# Patient Record
Sex: MALE | Race: WHITE | NOT HISPANIC OR LATINO | Employment: OTHER | URBAN - METROPOLITAN AREA
[De-identification: names, ages, dates, MRNs, and addresses within clinical notes are randomized per-mention and may not be internally consistent; named-entity substitution may affect disease eponyms.]

---

## 2023-04-25 PROCEDURE — 99285 EMERGENCY DEPT VISIT HI MDM: CPT | Mod: 25

## 2023-04-26 ENCOUNTER — HOSPITAL ENCOUNTER (OUTPATIENT)
Facility: CLINIC | Age: 33
Setting detail: OBSERVATION
Discharge: STILL ADMITTED OR ADMINISTRATIVE | End: 2023-04-30
Attending: EMERGENCY MEDICINE | Admitting: NURSE PRACTITIONER
Payer: MEDICARE

## 2023-04-26 DIAGNOSIS — F33.1 MODERATE EPISODE OF RECURRENT MAJOR DEPRESSIVE DISORDER (H): ICD-10-CM

## 2023-04-26 DIAGNOSIS — F84.0 AUTISM SPECTRUM DISORDER: ICD-10-CM

## 2023-04-26 DIAGNOSIS — F41.9 ANXIETY: ICD-10-CM

## 2023-04-26 DIAGNOSIS — F22 DELUSIONS (H): Primary | ICD-10-CM

## 2023-04-26 PROBLEM — F32.A DEPRESSION, UNSPECIFIED DEPRESSION TYPE: Status: ACTIVE | Noted: 2023-04-26

## 2023-04-26 LAB — SARS-COV-2 RNA RESP QL NAA+PROBE: NEGATIVE

## 2023-04-26 PROCEDURE — G0378 HOSPITAL OBSERVATION PER HR: HCPCS

## 2023-04-26 PROCEDURE — 250N000013 HC RX MED GY IP 250 OP 250 PS 637: Performed by: EMERGENCY MEDICINE

## 2023-04-26 PROCEDURE — 90791 PSYCH DIAGNOSTIC EVALUATION: CPT

## 2023-04-26 PROCEDURE — 99222 1ST HOSP IP/OBS MODERATE 55: CPT | Mod: AI | Performed by: NURSE PRACTITIONER

## 2023-04-26 PROCEDURE — 250N000013 HC RX MED GY IP 250 OP 250 PS 637: Performed by: NURSE PRACTITIONER

## 2023-04-26 PROCEDURE — U0005 INFEC AGEN DETEC AMPLI PROBE: HCPCS | Performed by: EMERGENCY MEDICINE

## 2023-04-26 RX ORDER — LANOLIN ALCOHOL/MO/W.PET/CERES
3 CREAM (GRAM) TOPICAL
Status: DISCONTINUED | OUTPATIENT
Start: 2023-04-26 | End: 2023-04-30 | Stop reason: HOSPADM

## 2023-04-26 RX ORDER — ARIPIPRAZOLE 2 MG/1
2 TABLET ORAL DAILY
Status: ON HOLD | COMMUNITY
End: 2023-05-01

## 2023-04-26 RX ORDER — QUETIAPINE FUMARATE 25 MG/1
25 TABLET, FILM COATED ORAL 3 TIMES DAILY PRN
Status: DISCONTINUED | OUTPATIENT
Start: 2023-04-26 | End: 2023-04-30 | Stop reason: HOSPADM

## 2023-04-26 RX ORDER — TRAZODONE HYDROCHLORIDE 50 MG/1
50 TABLET, FILM COATED ORAL AT BEDTIME
Status: ON HOLD | COMMUNITY
End: 2023-05-01

## 2023-04-26 RX ADMIN — MELATONIN TAB 3 MG 3 MG: 3 TAB at 21:43

## 2023-04-26 RX ADMIN — QUETIAPINE FUMARATE 25 MG: 25 TABLET ORAL at 21:43

## 2023-04-26 RX ADMIN — MELATONIN TAB 3 MG 3 MG: 3 TAB at 04:17

## 2023-04-26 ASSESSMENT — COLUMBIA-SUICIDE SEVERITY RATING SCALE - C-SSRS
5. HAVE YOU STARTED TO WORK OUT OR WORKED OUT THE DETAILS OF HOW TO KILL YOURSELF? DO YOU INTEND TO CARRY OUT THIS PLAN?: NO
2. HAVE YOU ACTUALLY HAD ANY THOUGHTS OF KILLING YOURSELF?: NO
1. HAVE YOU WISHED YOU WERE DEAD OR WISHED YOU COULD GO TO SLEEP AND NOT WAKE UP?: YES
4. HAVE YOU HAD THESE THOUGHTS AND HAD SOME INTENTION OF ACTING ON THEM?: NO
ATTEMPT LIFETIME: NO
TOTAL  NUMBER OF INTERRUPTED ATTEMPTS LIFETIME: NO
1. IN THE PAST MONTH, HAVE YOU WISHED YOU WERE DEAD OR WISHED YOU COULD GO TO SLEEP AND NOT WAKE UP?: NO
2. HAVE YOU ACTUALLY HAD ANY THOUGHTS OF KILLING YOURSELF?: YES
3. HAVE YOU BEEN THINKING ABOUT HOW YOU MIGHT KILL YOURSELF?: YES
6. HAVE YOU EVER DONE ANYTHING, STARTED TO DO ANYTHING, OR PREPARED TO DO ANYTHING TO END YOUR LIFE?: NO
TOTAL  NUMBER OF ABORTED OR SELF INTERRUPTED ATTEMPTS LIFETIME: NO

## 2023-04-26 ASSESSMENT — ACTIVITIES OF DAILY LIVING (ADL)
ADLS_ACUITY_SCORE: 35
ADLS_ACUITY_SCORE: 35
HYGIENE/GROOMING: INDEPENDENT
ADLS_ACUITY_SCORE: 35

## 2023-04-26 ASSESSMENT — ENCOUNTER SYMPTOMS: DYSPHORIC MOOD: 1

## 2023-04-26 NOTE — CONSULTS
"Diagnostic Evaluation Consultation  Crisis Assessment    Patient was assessed: I-Pad  Patient location: [facility]  Was a release of information signed: No. Reason: NA      Referral Data and Chief Complaint  Pt is a 33 year old, who uses he/him pronouns, and presents to the ED via EMS. Patient is referred to the ED by other: community member. Patient is presenting to the ED for the following concerns: increasing depression, mild symptoms of psychosis.      Informed Consent and Assessment Methods     Patient is his own guardian. Writer met with patient and explained the crisis assessment process, including applicable information disclosures and limits to confidentiality, assessed understanding of the process, and obtained consent to proceed with the assessment. Patient was observed to be able to participate in the assessment as evidenced by engagement with . Assessment methods included conducting a formal interview with patient, review of medical records, collaboration with medical staff, and obtaining relevant collateral information from family and community providers when available..     Over the course of this crisis assessment provided reassurance and offered validation. Patient's response to interventions was engaged.     Summary of Patient Situation     Pt presents to the ED with increase in depression. Per EMS report a bystander in the community called 911 because pt was \"acting strangely,\" searching for his dog who he lost 7 years ago and was not answering questions appropriately. Pt was A & O x4.     Writer met with pt via ipad, pt in [facility]. Pt was pleasant, calm, and cooperative in conversation, with some delayed responses to questions from writer. Pt is unable to share what led to 911 call that brought him to the ED. Py confirms that a bystander in the community called 911, however, reports he is unsure of why they might have called. Pt reports prior to arrival, he was having trouble sleeping.     Pt " "reports his sleep has been poor overall, average ~ 4 hours/night. Pt reports poor appetite, but eating \"a little bit.\" Pt reports experiencing depression almost his entire life, with it worsening since his  hospitalization in 2019. Pt is not currently taking medications. Denies SI, denies HI.     Pt reports AH, describes this as hearing and experiencing songs differently than he has in the past. Pt reports th tone, language, and feelings on certain songs are different than they used to be. Pt has difficulty explaining this to writer. Pt denies any voices    Pt reports VH, describes experiencing numbers and words differently. For example, when he sees a sign that says, \"Speed Limit,\" each letter has a meaning and the words form sentences.     Pt reports that he feels like he needs structure, \"unity, labor, vision, courage,\" and expresses value toward impulsivity. Pt reports he is open to medications, with some hesitation.     Brief Psychosocial History     Pt reports he is currently not working and is homeless. Pt reports he has been homeless for ~12 years, and has been staying at shelters. Pt reports he does not have any family or friends who he currently remains in contact with.     Pt identifies hobbies of: playing video games, listening to music, going for walks. Pt also reports he likes animals and reports that he treats others well.     Significant Clinical History     Per chart review, pt has previous MH dx of MDD, recurrent, severe, with psychotic features, schizoaffective d/o, and ASD. Last  admission was 11/30/2019-12/11/2019. Pt was discharged with medications to an IR facility.     Pt confirms history of the following medications: Abilify, trazodone, vistaril, and Zyprexa. Pt reports he did not take the Zyprexa after his discharge because he did not like the way it made him feel. Pt reports he took Abilify for ~ 3 years, but discontinued 2/1/2023. Pt reports discontinuation of Trazodone ~3-4 weeks " "ago.     Pt reports he see an OP psychiatry provider, Zoya. Pt reports last appt \"a few weeks ago.\" Per chart review, last appt was 4/18/23. Pt reports he does not currently have an OP MH therapist. Pt reports he currently has a mental health , Arben Hua.      Collateral Information    The following information was received from Julito whose relationship to the patient is . Information was obtained via phone. Their phone number is [phone] and they last had contact with patient last month.     How long have you been working with the patient: 2 years        How often do you see them: a couple times a month     What is the patient's legal status (Commitment, probation, guardian, etc.): volunatry     How does their current level of functioning compare to baseline: was not aware he was in ED;  At last contact, had no concerns.      Concern about alcohol/drug use? No     Has the patient made any comments about wanting to kill themselves/others: No     What is your treatment plan going forward: recently moved into a new apartment, in process of getting furniture.  Appointment with [facility] on 5/15/23. Weight loss.  Helping mom (ZURI tx hx) and brother (homeless), grandma in a major part of his life too.      Other information: CM referred patient to therapy at [place], denied service due to wait.   Psychiatry has provider.  She will be in a meeting until 1p today so coordination can be done after that hour.      -Collateral collected by Ector Troy, LGSW, MSW     Risk Assessment  [place] Suicide Severity Rating Scale Full Clinical Version:4/26/2023  Suicidal Ideation  1. Wish to be Dead (Lifetime): Yes  1. Wish to be Dead (Past 1 Month): No  2. Non-Specific Active Suicidal Thoughts (Lifetime): Yes  2. Non-Specific Active Suicidal Thoughts (Past 1 Month): No  3. Active Suicidal Ideation with any Methods (Not Plan) Without Intent to Act (Lifetime): Yes  Active Suicidal Ideation with any " Methods (Not Plan) Description (Lifetime): thoughts of jumping off a  bridge  3. Active Suicidal Ideation with any Methods (Not Plan) Without Intent to Act (Past 1 Month): No  4. Active Suicidal Ideation with Some Intent to Act, Without Specific Plan (Lifetime): No  5. Active Suicidal Ideation with Specific Plan and Intent (Lifetime): No  Intensity of Ideation  Most Severe Ideation Rating (Lifetime): 3  Suicidal Behavior  Actual Attempt (Lifetime): No  Has subject engaged in non-suicidal self-injurious behavior? (Lifetime): No  Interrupted Attempts (Lifetime): No  Aborted or Self-Interrupted Attempt (Lifetime): No  Preparatory Acts or Behavior (Lifetime): No  C-SSRS Risk (Lifetime/Recent)  Calculated C-SSRS Risk Score (Lifetime/Recent): No Risk Indicated        Validity of evaluation is not impacted by presenting factors during interview.   Comments regarding subjective versus objective responses to [place] tool: NA  Environmental or Psychosocial Events: helplessness/hopelessness, unemployment/underemployment, unstable housing, homelessness and social isolation  Chronic Risk Factors: history of psychiatric hospitalization, chronic and ongoing sleep difficulties, parental substance abuse issue and serious, persistent mental illness   Warning Signs: seeking access to means to hurt or kill self, hopelessness, acting reckless or engaging in risky activities, feeling trapped, like there is no way out, no reason for living, no sense of purpose in life and engaging in self-destructive behavior  Protective Factors: able to access care without barriers and optimistic outlook - identification of future goals  Interpretation of Risk Scoring, Risk Mitigation Interventions and Safety Plan:  Pt currently presents with low risk of harm to self. Pt denies SI, denies NSSIB. However, pt presents with increasing depression with some psychotic features, providing an element of risk.          Does the patient have thoughts of harming  others? No     Is the patient engaging in sexually inappropriate behavior?  no        Current Substance Abuse     Is there recent substance abuse? no     Was a urine drug screen or blood alcohol level obtained: No       Mental Status Exam     Affect: Appropriate   Appearance: Appropriate    Attention Span/Concentration: Attentive  Eye Contact: Engaged   Fund of Knowledge: Appropriate    Language /Speech Content: Fluent   Language /Speech Volume: Normal    Language /Speech Rate/Productions: Normal    Recent Memory: Variable   Remote Memory: Intact   Mood: Depressed    Orientation to Person: Yes    Orientation to Place: Yes   Orientation to Time of Day: Yes    Orientation to Date: Yes    Situation (Do they understand why they are here?): Yes    Psychomotor Behavior: Normal    Thought Content: Clear   Thought Form: Intact      History of commitment: No       Medication    Psychotropic medications:   Current Facility-Administered Medications   Medication     melatonin tablet 3 mg     No current outpatient medications on file.     Medication changes made in the last two weeks: Pt reports he is not taking medications. See medication hx in clinical history section.        Current Care Team    Primary Care Provider:[facility]  Psychiatrist: Karen Michelle CNS    Therapist: No  : Arben Hua, [phone]      CTSS or ARMHS: No  ACT Team: No  Other: No      Diagnosis    296.34 (F33.3) Major Depressive Disorder, Recurrent Episode, With psychotic features _ - Primary, by history    Clinical Summary and Substantiation of Recommendations    Pt currently presents with low risk of harm to self. Pt denies SI, denies NSSIB. However, pt presents with increasing depression with some psychotic features, providing an element of risk. Pt is interested in discussing medication with [facility] psychiatry provider. Pt is open to staying overnight in [facility] if recommended by care team. Pt may benefit from stabilization of  acute symptoms of psychosis and lack of sleep while in [facility].   A lower level of care has been unsuccessful in treating and stabilizing patient s mental health symptoms. However, with brief observation, monitored therapeutic treatment, and intervention of mental health symptoms in [facility], symptoms may be mitigated with potential for disposition to a less restrictive level of care than an inpatient setting.    Disposition    Recommended disposition: Other: Observation       Reviewed case and recommendations with attending provider. Attending Name: Filipe Chaves     Attending concurs with disposition: Yes       Patient and/or validated legal guardian concurs with disposition: Yes       Final disposition: Other: Observation in [facility].       Assessment Details    Patient interview started at: 9:33am and completed at: 10:08am.     Total duration spent on the patient case in minutes: .75 hrs      CPT code(s) utilized: 96938 - Psychotherapy for Crisis - 60 (30-74*) min       LUANNE Borden, Psychotherapist Trainee, Psychotherapist  [facility] - Triage & Transition Services  Callback: [phone]

## 2023-04-26 NOTE — PROGRESS NOTES
[facility] Group Progress Note    Client Name: Connor Soares  Date: April 26, 2023  Service Type:  Group Therapy      Topic:   Morning check in group/goal for the day     Response:  Patient did not participate in group.        HELADIO Ortiz, LICSW

## 2023-04-26 NOTE — ED TRIAGE NOTES
Patient states depression for 10 years.  Denies suicidal thoughts.  Reports he is homeless.  Denies drug or alcohol use.       Triage Assessment     Row Name 04/26/23 0000       Triage Assessment (Adult)    Airway WDL WDL       Respiratory WDL    Respiratory WDL WDL       Skin Circulation/Temperature WDL    Skin Circulation/Temperature WDL WDL       Cardiac WDL    Cardiac WDL WDL       Peripheral/Neurovascular WDL    Peripheral Neurovascular WDL WDL       Cognitive/Neuro/Behavioral WDL    Cognitive/Neuro/Behavioral WDL X    Level of Consciousness alert    Arousal Level opens eyes spontaneously    Orientation oriented x 4       Ford Coma Scale    Best Eye Response 4-->(E4) spontaneous    Best Motor Response 6-->(M6) obeys commands    Best Verbal Response 5-->(V5) oriented    Rohit Coma Scale Score 15

## 2023-04-26 NOTE — PLAN OF CARE
Connor Soares  April 26, 2023  Plan of Care Hand-off Note     Patient Care Path: Observation    Plan for Care:     Pt currently presents with low risk of harm to self. Pt denies SI, denies NSSIB. However, pt presents with increasing depression with some psychotic features, providing an element of risk. Pt is interested in discussing medication with [facility] psychiatry provider. [facility] by care team. Pt may benefit from stabilization of acute symptoms of psychosis and lack of sleep while in [facility].     A lower level of care has been unsuccessful in treating and stabilizing patient s mental health symptoms. However, with brief observation, monitored therapeutic treatment, and intervention of mental health symptoms in [facility], symptoms may be mitigated with potential for disposition to a less restrictive level of care than an inpatient setting.    Critical Safety Issues: increasing depression with some symptoms of psychosis    Overview:  This patient is a child/adolescent: No    This patient has additional special visitor precautions: No    Legal Status: Voluntary    Contacts:   : Arben Hua, [phone]     Psychiatry Consult:  [facility]    Updated Attending Provider regarding plan of care.    LUANNE Borden

## 2023-04-26 NOTE — ED PROVIDER NOTES
"[facility] Unit - Initial Psychiatric Observation Note  [facility] Emergency Department  Observation Initiation Date: Apr 25, 2023    Connor Soares MRN: [id number]   Age: 33 year old YOB: 1990     History     Chief Complaint   Patient presents with     Depression     HPI  Connor Soares is a 33 year old male with a past history notable for major depressive disorder versus schizoaffective disorder, depressive type, anxiety, and autism spectrum disorder. Patient presented to the emergency department after a bystander contacted 911 due to concern that patient was behaving strangely. The bystander told EMS that patient had stated that he was looking for a dog that he lost 7 years ago and was not answering questions appropriately. Patient was medically evaluated in the emergency department and found to be medically stable for transfer to [facility] for further psychiatric assessment.     Here at [facility], patient has been resting in his recliner chair for most of the day. Nursing notes reviewed, report received from [facility]. On interview, patient begins to only answering questions by shaking his head. When asked how he is feeling, he gives a \"thumbs up.\" When discussing writer's understanding of the events leading to his emergency department visit, patient simply began shaking his head \"no\" as though the described events were not accurate. When asked for his account of the events, he does not answer. When asked if recalls speaking with the [facility] earlier today, he shakes his head \"no.\" When asked if he sees a psychiatric provider, he shakes his head \"no,\" despite recent notes indicating that he sees a psychiatric provider named Karen Vizcaino at [facility]. Began to discuss how we can best help patient while he is here. He agrees that he would like treatment for his depression. At this point, he begins speaking. He reports that he would like a normal life where he wakes up, brushes his teeth, showers, puts on " "clothes, does the dishes, takes out the trash. Reports he would like more of a purpose, and does not want to waste his life away. He then mentions he would like to go to [place], but does not have a plan to get there. He also says he could go to a hotel from here but it is unclear if he is able to pay for a hotel room. He is not currently interested in a crisis residence or shelter. We begin discussing treatment for his depression. Writer begins listing antidepressants he has heard of or taken. Patient declines most medications listed. Reports he had tried escitalopram, sertraline, fluoxetine. Had a bad reaction to Effexor and began laughing when this was mentioned, but declined to discuss his experience. Patient then states \"you could list all the antidepressants in the world and I probably wouldn't be open to taking any of them.\" We discussed to have patient stay overnight on observation and reassess tomorrow, to which he was agreeable. He denies any suicidal or homicidal ideation. Denies auditory or visual hallucinations. He does not appear manic.     Past Medical History  No past medical history on file.  No past surgical history on file.  ARIPiprazole (ABILIFY) 2 MG tablet  Semaglutide (OZEMPIC, 2 MG/DOSE, SC)  traZODone (DESYREL) 50 MG tablet      No Known Allergies  Family History  No family history on file.  Social History           Review of Systems  A medically appropriate review of systems was performed with pertinent positives and negatives noted in the HPI, and all other systems negative.    Physical Examination   BP: (!) 154/74  Pulse: 114  Temp: 98  F (36.7  C)  Resp: 16  Height: 175.3 cm (5' 9\")  Weight: (!) 172.4 kg (380 lb)  SpO2: 98 %    Physical Exam  General: Appears stated age.   Neuro: Alert and fully oriented. Extremities appear to demonstrate normal strength on visual inspection.   Integumentary/Skin: no rash visualized, normal color    Psychiatric Examination   Appearance: awake, alert, " adequately groomed, dressed in hospital scrubs and appeared as age stated  Attitude:  cooperative  Eye Contact:  vacillating from poor to intense stare  Mood:  anxious and depressed  Affect:  mood congruent and intensity is blunted  Speech:  clear, coherent and normal prosody  Psychomotor Behavior:  no evidence of tardive dyskinesia, dystonia, or tics  Thought Process:  illogical  Associations:  no loose associations  Thought Content:  no evidence of suicidal ideation or homicidal ideation, no auditory hallucinations present, no visual hallucinations present and unclear if he is experiencing delusional thinking  Insight:  fair  Judgement:  fair  Oriented to:  time, person, and place  Attention Span and Concentration:  fair  Recent and Remote Memory:  fair  Language: able to name/identify objects without impairment  Fund of Knowledge: intact with awareness of current and past events    ED Course        Labs Ordered and Resulted from Time of ED Arrival to Time of ED Departure   COVID-19 VIRUS (CORONAVIRUS) BY PCR - Normal       Result Value    SARS CoV2 PCR Negative         Assessments & Plan (with Medical Decision Making)   Patient presenting to the emergency department due to bystander concern regarding odd behavior. Pt with hx of depression, anxiety, and ASD. Not currently prescribed medications but is seeing a psychiatrist. He is homeless. He was not open to starting any medication tonight but is agreeable to remain on observation for ongoing disposition planning. Nursing notes reviewed noting no acute issues.     I have reviewed the assessment completed by the [facility].     During the observation period, the patient did not require medications for agitation, and did not require restraints/seclusion for patient and/or provider safety.     The patient was found to have a psychiatric condition that would benefit from an observation stay in the emergency department for further psychiatric stabilization and/or  coordination of a safe disposition. The observation plan includes serial assessments of psychiatric condition, potential administration of medications if indicated, further disposition pending the patient's psychiatric course during the monitoring period.     Preliminary diagnosis:    ICD-10-CM    1. Moderate episode of recurrent major depressive disorder (H)  F33.1     r/o psychotic features, r/o schizoaffective disorder, depressive type      2. Autism spectrum disorder  F84.0       3. Anxiety  F41.9            Treatment Plan:  - Defer initiation of an antidepressant for tonight due to patient preference  - Will order quetiapine 25 mg tid prn for anxiety or sleep  - Patient to f/u with his outpatient psychiatrist upon discharge. Would also benefit from a psychotherapist.   - [place] to observation status with plan for reevaluation tomorrow    --  Filipe Chaves CNP   [facility] EMERGENCY DEPT  [facility] Unit  4/25/2023        Filipe Chaves CNP  04/26/23 [id number]

## 2023-04-26 NOTE — PROGRESS NOTES
Calm and cooperative, pleasant, denies pain, normal affect, slept until 9. Am, ate breakfast, now talking with [facility] via video chat.

## 2023-04-26 NOTE — PROGRESS NOTES
The following information was received from Julito whose relationship to the patient is . Information was obtained via phone. Their phone number is [phone] and they last had contact with patient last month.    How long have you been working with the patient: 2 years       How often do you see them: a couple times a month    What is the patient's legal status (Commitment, probation, guardian, etc.): volunatry    How does their current level of functioning compare to baseline: was not aware he was in ED;  At last contact, had no concerns.     Concern about alcohol/drug use? No    Has the patient made any comments about wanting to kill themselves/others: No     What is your treatment plan going forward: recently moved into a new apartment, in process of getting furniture.  Appointment with [facility] on 5/15/23. Weight loss.  Helping mom (ZURI tx hx) and brother (homeless), grandma in a major part of his life too.     Other information: CM referred patient to therapy at [place], denied service due to wait.   Psychiatry has provider.  She will be in a meeting until 1p today so coordination can be done after that hour.     Ector Troy, LGSW, MSW

## 2023-04-26 NOTE — ED PROVIDER NOTES
"    History     Chief Complaint:  Depression       The history is provided by the patient.      Connor Soares is a 33 year old male with a history of schizoaffective disorder, depression, and autism spectrum disorder who presents with increase depression. The patient reports that his depression symptoms have worsened causing him to present to the ED. He states that he is currently unemployed and homeless. He has previously taken medications for his mental health but notes that this was 12 years ago and has since been taken off these medications. The patient does not have a therapist and has not talked to anyone regarding these symptoms. He denies suicidal ideations. He also denies drug or alcohol use.       Independent Historian: No independent historian      Review of External Notes: Reviewed the psychiatry visit from 4/18    ROS:  Review of Systems   Psychiatric/Behavioral: Positive for dysphoric mood. Negative for suicidal ideas.   All other systems reviewed and are negative.      Allergies:  The patient has no active allergies.      Medications:    Albuterol   Ozempic     Past Medical History:    Binge eating disorder   Schizoaffective disorder   Autism spectrum disorder   psychosis  Depression   Anxiety   Asthma   ADHD  Bipolar disorder     Past Surgical History:    Tonsillectomy and adenoidectomy      Social History:  The patient reports that he is unemployed and homeless.   He arrived via EMS.     Physical Exam     Patient Vitals for the past 24 hrs:   BP Temp Temp src Pulse Resp SpO2 Height Weight   04/26/23 0001 (!) 154/74 98  F (36.7  C) Temporal 114 16 98 % 1.753 m (5' 9\") (!) 172.4 kg (380 lb)        Physical Exam  General: Sitting up in bed  Eyes:  The pupils are equal and round    Conjunctivae and sclerae are normal  ENT:    Wearing a mask  Neck:  Normal range of motion  CV:  Tachycardic rate    Skin warm and well perfused   Resp:  Non labored breathing on room air    No tachypnea    No cough " heard  MS:  Normal muscular tone  Skin:  No rash or acute skin lesions noted  Neuro:   Awake, alert.      Speech is normal and fluent.    Face is symmetric.     Moves all extremities equally  Psych: Normal affect.  Appropriate interactions.    Emergency Department Course     Laboratory:  Labs Ordered and Resulted from Time of ED Arrival to Time of ED Departure   COVID-19 VIRUS (CORONAVIRUS) BY PCR - Normal       Result Value    SARS CoV2 PCR Negative        Emergency Department Course & Assessments:    Independent Interpretation (X-rays, CTs, rhythm strip):  None      Consultations/Discussion of Management or Tests:  None      Assessments:  0220 I obtained history and examined the patient as noted above. We discussed transfer to [facility] and the patient is comfortable with this plan.     Disposition:  The patient was transferred to [facility].     Impression & Plan    CMS Diagnoses: None      Medical Decision Making:  Connor Soares is a 33-year-old male who presented to the emergency department with depression.  He reports wanting help for depression.  He denies any suicidal or homicidal ideation.  Denies any drug use.  He is calm in the emergency department.  He would like to go to [facility].  He has no medical concerns at this time.  Final disposition per [facility].    Diagnosis:    ICD-10-CM    1. Depression, unspecified depression type  F32.A            Scribe Disclosure:  IKrupa, am serving as a scribe at 2:31 AM on 4/26/2023 to document services personally performed by Clarissa Doan MD based on my observations and the provider's statements to me.    4/26/2023   Clarissa Doan MD Goertz, Maria Kristine, MD  04/26/23 0609

## 2023-04-26 NOTE — PROGRESS NOTES
Pt presents due to worsening depression, homelessness, and fleeting thoughts of suicide with no plan or intent. He has had no previous suicide attempts, he takes no medications, denies drug or use, no HI, no halliucations. Pt is seeking help for his ongoing depression and resources for his living situation. Pt changed to scrubs, his belongings were placed in  a locker. Tour and routine of unit explained to pt.

## 2023-04-27 PROCEDURE — G0378 HOSPITAL OBSERVATION PER HR: HCPCS

## 2023-04-27 PROCEDURE — 250N000013 HC RX MED GY IP 250 OP 250 PS 637: Performed by: PSYCHIATRY & NEUROLOGY

## 2023-04-27 PROCEDURE — 99231 SBSQ HOSP IP/OBS SF/LOW 25: CPT | Mod: 95 | Performed by: PSYCHIATRY & NEUROLOGY

## 2023-04-27 PROCEDURE — 250N000013 HC RX MED GY IP 250 OP 250 PS 637: Performed by: NURSE PRACTITIONER

## 2023-04-27 RX ORDER — QUETIAPINE FUMARATE 50 MG/1
50 TABLET, FILM COATED ORAL AT BEDTIME
Status: DISCONTINUED | OUTPATIENT
Start: 2023-04-27 | End: 2023-04-30 | Stop reason: HOSPADM

## 2023-04-27 RX ADMIN — QUETIAPINE FUMARATE 50 MG: 50 TABLET ORAL at 22:22

## 2023-04-27 RX ADMIN — QUETIAPINE FUMARATE 25 MG: 25 TABLET ORAL at 20:02

## 2023-04-27 ASSESSMENT — ACTIVITIES OF DAILY LIVING (ADL)
ADLS_ACUITY_SCORE: 35

## 2023-04-27 NOTE — ED NOTES
Pt agreeable with plan to stay overnight on observation. Pt spent majority of evening sleeping in recliner. Woke up to eat dinner, watch TV, and meet with provider. Patient initially stated he is not currently taking medications and did not want to take any medications. Pt endorses depressed mood this evening, denies feeling anxious, denies SI. At bedtime, pt requested Seroquel for sleep. Pt is sleeping in Sensory Room D. Nursing will continue to monitor.

## 2023-04-27 NOTE — ED NOTES
[facility] Group Progress Note    Client Name: Connor Soares  Date: April 26, 2023  Service Type:  Group Therapy  Session Start Time:  7:30pm  Session End Time: 8:30pm  Session Length: 60 minutes  Attendees: Patient and other group members  Facilitator: LUANNE Solano     Topic:   Group activity. Choice to do meditation or play game    Intervention:    Group process: support, challenge, affirm, psycho-education.     Response:  Patient did not participate in group. Pt chose to remain resting in a sensory room.        LUANNE Solano

## 2023-04-27 NOTE — PROGRESS NOTES
Pt sleeping comfortably in sensory room D; no signs of distress or discomfort observed.  Q15 min checks ongoing.

## 2023-04-27 NOTE — PROGRESS NOTES
"Triage & Transition Services [facility]     Progress Note    Patient: Connor goes by \"Connor,\" uses he/him pronouns  Date of Service: April 27, 2023  Site of Service: [facility]  Patient was seen in-person.     Presenting problem:  Please see initial DEC/[facility] Crisis Assessment completed by Martin Mccauley on 4/26/2023 for complete assessment information. Notable concerns include increasing depression, mild symptoms of psychosis.     Individuals Present: Connor & Ector Troy [facility]    Session start: 1315  Session end: 1335  Session duration in minutes: 20  CPT utilized: 37102 - Psychotherapy (with patient) - 30 (16-37*) min      Current Presentation:   Patient and writer met in sensory room D for reassessment.  He shook writer's hand at introduction.  Patient's responses throughout assessment with slightly delayed or he asked for a moment to compose his thoughts, sometimes his responses were slightly unrelated to the question/prompt posed.  Patient reports that he is feeling good with lots of sleep, being around positive people.  He reports that [facility] has allowed him to think about his past choices, both good and bad.      Patient engaged in conversation about outpatient providers.  He reports meeting with a medication management provider off and on for three years at [facility].  Patient is agreeable today to reconnecting with his  (yesterday he declined an NIMO for her).  He is interested in getting set up with outpatient therapy as well before discharge.     Patient thinks another night in observation would be helpful so we could be more planful about discharge back to his newly acquired apartment.  He has reservations about his apartment as \"it is in the basement of the building.\"  Patient is willing to work with writer and  tomorrow to create a safe discharge plan.     Additional Collateral Information:  None     Mental Status Exam     Affect: Appropriate   Appearance: Appropriate " and Other: slight malodorous    Attention Span/Concentration: Attentive  Eye Contact: Engaged   Fund of Knowledge: Appropriate    Language /Speech Content: Fluent   Language /Speech Volume: Normal    Language /Speech Rate/Productions: Normal    Recent Memory: Variable   Remote Memory: Variable   Mood: Normal    Orientation to Person: Yes    Orientation to Place: Yes   Orientation to Time of Day: Yes    Orientation to Date: Yes    Situation (Do they understand why they are here?): Yes    Psychomotor Behavior: Normal    Thought Content: Clear   Thought Form: Intact     Diagnosis:   296.34 (F33.3) Major Depressive Disorder, Recurrent Episode, With psychotic features _ - Primary, by history    Therapeutic Intervention(s):   Provided active listening, unconditional positive regard, and validation. Engaged in safety planning.  Reviewed healthy living that supports positive mental health, including looking at sleep hygiene, regular movement, nutrition, and regular socialization. Identified and practiced coping skills.    Treatment Objective(s) Addressed:   The focus of this session was on rapport building, orienting the patient to therapy, safety planning, identifying an appropriate aftercare plan, assessing safety and identifying additional supports.     Progress Towards Goals:   Patient reports stable symptoms. Patient is making progress towards treatment goals as evidenced by feeling better and willing to reconnect with outpatient providers.     Case Management:   none     Plan:   Observation: to continue medication administration for mental health stability    Attending concurs with disposition: Yes        Clinical Summary and Substantiation of Recommendations    Patient is improved since yesterday in regards to willingness to talk and engage in planning of care.  He has engaged eye contact, thoughts are slightly delayed yet forward oriented, and slightly malodorous.  Patient recognizes improvement here at [facility] and  is open to reconnecting with new and established outpatient providers.  Patient is unsure if he wants to return to his newly acquired apartment.  Patient denies SI, HI, hallucinations, and paranoia.       LUANNE Gaitan

## 2023-04-27 NOTE — PROGRESS NOTES
"Patient resting in recliner in sensory room D most of shift. He has been making his needs known appropriately. Eating and drinking well. No scheduled medication this morning. Declined need for PRN's. Eating and drinking well. Flat affect, mood is described as \"okay\". Denies SI. Awaiting reassessment today.   "

## 2023-04-27 NOTE — ED NOTES
Pt resting in recliner in Sensory Room D; respirations even and unlabored. Will continue to monitor.

## 2023-04-27 NOTE — ED PROVIDER NOTES
Telemedicine Visit: The patient's condition can be safely assessed and treated via synchronous audio and visual telemedicine encounter.      Reason for Telemedicine Visit: Patient required immediate assessment / treatment     Originating Site (Patient Location): [facility]    Distant Location (provider location):  Off-site    Consent:  The patient/guardian has verbally consented to: the potential risks and benefits of telemedicine (video visit) versus in person care; bill my insurance or make self-payment for services provided; and responsibility for payment of non-covered services.     Mode of Communication:  Video Conference via [employer]    As the provider I attest to compliance with applicable laws and regulations related to telemedicine.    Start/Stop: 4:30p-5:00p    [facility] Unit - Psychiatric Consultation  [facility] Emergency Department    Connor Soares MRN: [id number]   Age: 33 year old YOB: 1990     History     Chief Complaint   Patient presents with     Depression     Per Filipe Chaves CNP, 4/26/23:  HPI  Connor Soares is a 33 year old male with a past history notable for major depressive disorder versus schizoaffective disorder, depressive type, anxiety, and autism spectrum disorder. Patient presented to the emergency department after a bystander contacted 911 due to concern that patient was behaving strangely. The bystander told EMS that patient had stated that he was looking for a dog that he lost 7 years ago and was not answering questions appropriately. Patient was medically evaluated in the emergency department and found to be medically stable for transfer to [facility] for further psychiatric assessment.      Here at [facility], patient has been resting in his recliner chair for most of the day. Nursing notes reviewed, report received from [facility]. On interview, patient begins to only answering questions by shaking his head. When asked how he is feeling, he gives a  "\"thumbs up.\" When discussing writer's understanding of the events leading to his emergency department visit, patient simply began shaking his head \"no\" as though the described events were not accurate. When asked for his account of the events, he does not answer. When asked if recalls speaking with the [facility] earlier today, he shakes his head \"no.\" When asked if he sees a psychiatric provider, he shakes his head \"no,\" despite recent notes indicating that he sees a psychiatric provider named Karen Vizcaino at [facility]. Began to discuss how we can best help patient while he is here. He agrees that he would like treatment for his depression. At this point, he begins speaking. He reports that he would like a normal life where he wakes up, brushes his teeth, showers, puts on clothes, does the dishes, takes out the trash. Reports he would like more of a purpose, and does not want to waste his life away. He then mentions he would like to go to [place], but does not have a plan to get there. He also says he could go to a hotel from here but it is unclear if he is able to pay for a hotel room. He is not currently interested in a crisis residence or shelter. We begin discussing treatment for his depression. Writer begins listing antidepressants he has heard of or taken. Patient declines most medications listed. Reports he had tried escitalopram, sertraline, fluoxetine. Had a bad reaction to Effexor and began laughing when this was mentioned, but declined to discuss his experience. Patient then states \"you could list all the antidepressants in the world and I probably wouldn't be open to taking any of them.\" We discussed to have patient stay overnight on observation and reassess tomorrow, to which he was agreeable. He denies any suicidal or homicidal ideation. Denies auditory or visual hallucinations. He does not appear manic.      Today, 4/27/23:  Patient reports overall feeling a little bit better.  Symptoms improving some " "with quetiapine use.  Slept better after taking quetiapine last night.  Patient agreeable to scheduling quetiapine at bedtime to further help with his mental health symptoms.  Appetite has been good.  Tolerating quetiapine well.  Did not feel too sedated in the morning.  No acute safety concerns.  No acute suicidality today.    Past Medical History  No past medical history on file.  No past surgical history on file.  ARIPiprazole (ABILIFY) 2 MG tablet  Semaglutide (OZEMPIC, 2 MG/DOSE, SC)  traZODone (DESYREL) 50 MG tablet      No Known Allergies  Family History  No family history on file.  Social History       Past medical history, past surgical history, medications, allergies, family history, and social history were reviewed with the patient. No additional pertinent items.       Review of Systems  A medically appropriate review of systems was performed with pertinent positives and negatives noted in the HPI, and all other systems negative.    Physical Examination   BP: (!) 154/74  Pulse: 114  Temp: 98  F (36.7  C)  Resp: 16  Height: 175.3 cm (5' 9\")  Weight: (!) 172.4 kg (380 lb)  SpO2: 98 %    Physical Exam  General: Appears stated age.   Neuro: Alert and fully oriented. Extremities appear to demonstrate normal strength on visual inspection.   Integumentary/Skin: no rash visualized, normal color    Psychiatric Examination   Appearance: awake, alert, adequately groomed and appeared as age stated  Attitude:  cooperative  Eye Contact:  good  Mood:  anxious and depressed  Affect:  intensity is blunted  Speech:  clear, coherent  Psychomotor Behavior:  no evidence of tardive dyskinesia, dystonia, or tics  Thought Process:  logical  Associations:  no loose associations  Thought Content:  no evidence of suicidal ideation or homicidal ideation, no evidence of psychotic thought, no auditory hallucinations present and no visual hallucinations present  Insight:  Fair to limited  Judgement:  fair  Oriented to:  time, person, " and place  Attention Span and Concentration:  fair  Recent and Remote Memory:  fair  Language: able to name/identify objects without impairment  Fund of Knowledge: intact with awareness of current and past events    ED Course        Labs Ordered and Resulted from Time of ED Arrival to Time of ED Departure   COVID-19 VIRUS (CORONAVIRUS) BY PCR - Normal       Result Value    SARS CoV2 PCR Negative         Assessments & Plan (with Medical Decision Making)   Patient presenting with to the emergency room after displaying bizarre behavior and a bystander called EMS with concern.  History of depression, anxiety, and autism spectrum disorder and presented on no psychiatric medication.  Has been seeing a psychiatrist.  Patient is homeless.  Patient declined starting any medication yesterday at intake but is agreeable today to scheduling quetiapine at bedtime since it has seen been helpful.  Outpatient provider will need to monitor metabolic impact of quetiapine closely since patient already overweight.  Recommend maintaining on lowest effective dose.  Nursing notes reviewed noting no acute issues.     I have reviewed the assessment completed by the [facility].     Preliminary diagnosis:    ICD-10-CM    1. Moderate episode of recurrent major depressive disorder (H)  F33.1     r/o psychotic features, r/o schizoaffective disorder, depressive type      2. Autism spectrum disorder  F84.0       3. Anxiety  F41.9            Treatment Plan:  -Continue observation status for safety and stabilization  -Continue quetiapine/Seroquel 25 mg TID PRN for anxiety, agitation, sleep  -Start quetiapine/Seroquel 50 mg at bedtime for hx of psychosis, mood, sleep, anxiety.   -Patient will follow up with outpatient psychiatrist upon discharge.  Recommend close metabolic monitoring while on quetiapine.  Also recommend maintaining quetiapine at lowest effective dose.  -Reassess in the morning    --  Kelly Perez DO   [facility]  [facility]  [facility]  4/27/2023     This note was created with voice recognition software. Inadvertent grammatical errors, typographical errors, and \"sound-a-like\" substitutions may occur due to limitations of the software.  Read the note carefully and apply context when erroneous substitutions have occurred. Thank you.        Kelly Perez,   04/27/23 8227    "

## 2023-04-28 PROCEDURE — 99233 SBSQ HOSP IP/OBS HIGH 50: CPT | Performed by: NURSE PRACTITIONER

## 2023-04-28 PROCEDURE — 250N000013 HC RX MED GY IP 250 OP 250 PS 637: Performed by: NURSE PRACTITIONER

## 2023-04-28 PROCEDURE — 250N000013 HC RX MED GY IP 250 OP 250 PS 637: Performed by: PSYCHIATRY & NEUROLOGY

## 2023-04-28 PROCEDURE — G0378 HOSPITAL OBSERVATION PER HR: HCPCS

## 2023-04-28 RX ADMIN — QUETIAPINE FUMARATE 25 MG: 25 TABLET ORAL at 10:02

## 2023-04-28 RX ADMIN — QUETIAPINE FUMARATE 50 MG: 50 TABLET ORAL at 21:05

## 2023-04-28 ASSESSMENT — ACTIVITIES OF DAILY LIVING (ADL)
ADLS_ACUITY_SCORE: 35

## 2023-04-28 NOTE — PROGRESS NOTES
Pt is asleep in the recliner in sensory room D.  No observed signs of discomfort or distress noted.  Q15 min checks ongoing.

## 2023-04-28 NOTE — PROGRESS NOTES
"Triage & Transition Services [facility]     Progress Note    Patient: Connor goes by \"Connor,\" uses he/him pronouns  Date of Service: April 28, 2023  Site of Service: [facility]  Patient was seen in-person.     Presenting problem:  Please see initial DEC/[facility] Crisis Assessment completed by Martin Mccauley on 4/26/2023 for complete assessment information. Notable concerns include increasing depression, mild symptoms of psychosis.     Individuals Present: Connor & Ector Troy [facility]    Session start: 1500  Session end: 1515  Session duration in minutes: 15  CPT utilized: 73655 - Psychotherapy (with patient) - 30 (16-37*) min      Current Presentation:   Patient has been isolating to Sensory Room D all day with the exception of participating in morning group.  Patient has difficulty expressing his concerns despite stating \"I need help.\"  He does get frustrated and slightly irritable when asked to describe what he needs help with.  With writer during this interaction, patient stated with some agitation \"I should not have to tell you.\"  Writer provided assurance to patient that we are not able to fully support patient if we do not know what is bothering him.  He finally disclosed that he has a bug in his stomach.  Writer thanked him for sharing that information so we can loop in the RN and psychiatry provider on how to best help patient.  Patient denied have any urges to self-remove the bug from his stomach and chuckled saying he would not even think of doing that.     He continues to deny having any apartment.     Patient agrees to spend another night in observation and meet with the evening provider to discuss further medication needs.     Additional Collateral Information:  None as patient states he has no one to collaborate with     Mental Status Exam     Affect: Appropriate   Appearance: Appropriate    Attention Span/Concentration: Attentive  Eye Contact: Engaged   Fund of Knowledge: Appropriate    Language /Speech Content: " Fluent   Language /Speech Volume: Normal    Language /Speech Rate/Productions: Normal    Recent Memory: Variable   Remote Memory: Variable   Mood: Depressed    Orientation to Person: Yes    Orientation to Place: Yes   Orientation to Time of Day: No    Orientation to Date: No    Situation (Do they understand why they are here?): Yes    Psychomotor Behavior: Normal    Thought Content: Delusions and Hallucinations   Thought Form: Intact     Diagnosis:   296.34 (F33.3) Major Depressive Disorder, Recurrent Episode, With psychotic features    Therapeutic Intervention(s):   Provided active listening, unconditional positive regard, and validation. Engaged in cognitive restructuring/ reframing, looked at common cognitive distortions and challenged negative thoughts. Engaged in activity scheduling and behavioral activation, looking at and reviewing the prior week's goals, problem solving any barriers and acknowledging successes, as well as setting new goals. Engaged in social skills training. Identified and practiced coping skills. Identified stress relief practices.    Treatment Objective(s) Addressed:   The focus of this session was on orienting the patient to therapy, identifying and practicing coping strategies, processing feelings related to feeling like he has a bug in his stomach, safety planning, assessing safety, identifying treatment goals and identifying additional supports.     Progress Towards Goals:   Patient reports stable symptoms. Patient is not making progress towards treatment goals as evidenced by isolating to sensory room D and being very guarded about symptoms/feelings for most of the day.     Case Management:   none     Plan:   Observation: continue observation status and explore further medication treatments for newly disclosed having a bug in his stomach    Attending concurs with disposition: Yes        Clinical Summary and Substantiation of Recommendations    Patient has been isolating to sensory  room D all day yet did emerge to participate in morning group which he was invited to.  Patient reports often that he is here for help with difficulty identifying what specifically he is wanting for help.  Patient eventually shared with writer that he has a bug in his stomach that he was taken care.  He denies any urges to remove the bug himself and is open to further medication intervention.  Patient also denies having an apartment or a safe place to return to when discharge is recommended, likely tomorrow.  Patient will stay on observation tonight.       Ector Troy, SOSW

## 2023-04-28 NOTE — PROGRESS NOTES
"EmPATH Group Progress Note    Client Name: Connor Soares  Date: April 28, 2023  Service Type:  Group Therapy  Session Start Time:  1015  Session End Time: 1035  Session Length: 20  Attendees: Patient and other group members  Facilitator: LUANNE Gaitan MSW     Topic:   Daily goals, positive experience and a challenged faced in the past 24 hours    Intervention:    Group process: support, challenge, affirm, psycho-education.     Response:  Patient did participate in group. Behavior in group was engaged. Patient shared his goals today are to lose weight and get help.  When asked what help he was hoping to receive, patient notes \"mental health, physical, and emotional.\"  His positive experience in the past 24 hours is interacting with people who are kind and respectful.  Patient's challenge in the past 24 hours is \"trying to understand myself better.\"  Patient appeared to have difficulty reading the hand-out sheet.      LUANNE Gaitan          "

## 2023-04-28 NOTE — ED PROVIDER NOTES
"[facility] Unit - Psychiatric Consultation  [facility] Emergency Department    Connor Soares MRN: [id number]   Age: 33 year old YOB: 1990     History     Chief Complaint   Patient presents with     Depression     HPI  Connor Soares is a 33 year old male with a past history notable for major depressive disorder versus schizoaffective disorder, depressive type, anxiety, and autism spectrum disorder. Patient presented to the emergency department after a bystander contacted 911 due to concern that patient was behaving strangely. The bystander told EMS that patient had stated that he was looking for a dog that he lost 7 years ago and was not answering questions appropriately. Patient was medically evaluated in the emergency department and found to be medically stable for transfer to [facility] for further psychiatric assessment.     Patient is seen for reassessment today. He appears brighter in affect than he appeared two days ago. He is more conversant, answering questions. He endorses improved sleep since starting quetiapine. Tolerating the 50 mg dose well without endorsement of side effects. He endorses improvements in his mood, which he attributes to the medication and talking with staff. He denies any suicidal or homicidal ideation. Asked him about a bug in his stomach, which he had mentioned to the [facility]. He denies any stomach upset, nausea, diarrhea, or constipation. He reports that this \"bug\" affects his brain and causes him to reflect on the past, interactions with people, \"positive and negative things.\" Reports he has been trying to get rid of this bug for quite some time. We discuss a plan to remain on observation for an additional night with plan for discharge tomorrow. He declines initiation of an antidepressant, stating he has trialed a number of antidepressants in the past and none have been helpful.     Past Medical History  No past medical history on file.  No past surgical history on " "file.  ARIPiprazole (ABILIFY) 2 MG tablet  Semaglutide (OZEMPIC, 2 MG/DOSE, SC)  traZODone (DESYREL) 50 MG tablet      No Known Allergies  Family History  No family history on file.  Social History           Review of Systems  A medically appropriate review of systems was performed with pertinent positives and negatives noted in the HPI, and all other systems negative.    Physical Examination   BP: (!) 154/74  Pulse: 114  Temp: 98  F (36.7  C)  Resp: 16  Height: 175.3 cm (5' 9\")  Weight: (!) 172.4 kg (380 lb)  SpO2: 98 %    Physical Exam  General: Appears stated age.   Neuro: Alert and fully oriented. Extremities appear to demonstrate normal strength on visual inspection.   Integumentary/Skin: no rash visualized, normal color    Psychiatric Examination   Appearance: awake, alert, adequately groomed, dressed in hospital scrubs and appeared as age stated  Attitude:  cooperative  Eye Contact:  good  Mood:  better  Affect:  mood congruent and intensity is normal  Speech:  clear, coherent and normal prosody  Psychomotor Behavior:  no evidence of tardive dyskinesia, dystonia, or tics  Thought Process:  linear and goal oriented  Associations:  no loose associations  Thought Content:  no evidence of suicidal ideation or homicidal ideation and possible delusions. Denies AH/VH.   Insight:  fair  Judgement:  fair  Oriented to:  time, person, and place  Attention Span and Concentration:  fair  Recent and Remote Memory:  fair  Language: able to name/identify objects without impairment  Fund of Knowledge: intact with awareness of current and past events    ED Course        Labs Ordered and Resulted from Time of ED Arrival to Time of ED Departure   COVID-19 VIRUS (CORONAVIRUS) BY PCR - Normal       Result Value    SARS CoV2 PCR Negative         Assessments & Plan (with Medical Decision Making)   Patient presenting with bizarre behavior leading to concern from a bystander who contacted 911. History of depression, anxiety, and " autism spectrum disorder and presented on no psychiatric medication. Has been seeing a psychiatrist. Mood appears to be improving from time of admission. Continues to display sort of an odd affect with possible delusional beliefs. Declines initiation of an antidepressant. Nursing notes reviewed noting no acute issues.     I have reviewed the assessment completed by the [facility].     Preliminary diagnosis:    ICD-10-CM    1. Moderate episode of recurrent major depressive disorder (H)  F33.1     r/o psychotic features, r/o schizoaffective disorder, depressive type      2. Autism spectrum disorder  F84.0       3. Anxiety  F41.9            Treatment Plan:  - Continue quetiapine 50 mg at bedtime for sleep, anxiety, mood, psychosis  - Continue quetiapine 25 mg tid prn for anxiety, agitation, sleep  - Continues to decline initiation of an antidepressant  -Patient will follow up with outpatient psychiatrist upon discharge.  Recommend close metabolic monitoring while on quetiapine.  Also recommend maintaining quetiapine at lowest effective dose.  - Patient to remain on observation for an additional night. Plan for discharge to his apartment tomorrow.     --  Filipe Chaves CNP   [facility] EMERGENCY DEPT  EmPATH Unit  4/25/2023      Filipe Chaves CNP  04/28/23 [id number]

## 2023-04-28 NOTE — ED NOTES
"Pt reports he is having racing thoughts and auditory hallucinations; requested PRN medication. When asked to describe auditory hallucinations, patient reports \"they are racing thoughts in my own voice.\" Pt denies command hallucinations. Pt denies SI currently. Will continue to monitor.   "

## 2023-04-28 NOTE — ED NOTES
Patient to stay overnight on observation and be reassessed in the morning. Patient spent majority of shift sleeping or resting in Sensory Room D. Pt is friendly and conversational when approached, but otherwise withdrawn and isolative in sensory room. Pt endorsed intermittent racing thoughts and anxiety this evening. Pt was not able to identify if he was anxious about anything specific. Pt denies SI this evening. Pt ate 100% of dinner. Pt is sleeping in Sensory Room D. Nursing will continue to monitor.

## 2023-04-28 NOTE — ED NOTES
[facility] C[facility] Group Progress Note    Client Name: Connor Soares  Date: April 27, 2023  Service Type:  Group Therapy  Session Start Time:  650  Session End Time: 710  Session Length: 20 min  Attendees: Patient and other group members  Facilitator: Montserrat Cheney     Topic:   Coping MH sx, anxiety    Intervention:    Group process: support, challenge, affirm, psycho-education.     Response:  Patient did not participate in group.      Montserrat Cheney [facility]

## 2023-04-28 NOTE — PROGRESS NOTES
"Pt slept well overnight in sensory room C. He has been resting in recliner in the sensory room most of morning but did move into milieu to participate in group. Pt endorses anxiety and \"trouble finding his thoughts\". Agreeable to taking PRN seroquel this morning. Reports \"I don't like feeling this way\". He has difficulty elaborating on what he means by this. Patient slow to respond at times and appears frustrated when describing how he feels. Denies SI. Eating and drinking well.   "

## 2023-04-29 ENCOUNTER — TELEPHONE (OUTPATIENT)
Dept: BEHAVIORAL HEALTH | Facility: CLINIC | Age: 33
End: 2023-04-29
Payer: MEDICARE

## 2023-04-29 LAB
ALBUMIN SERPL BCG-MCNC: 4.2 G/DL (ref 3.5–5.2)
ALP SERPL-CCNC: 83 U/L (ref 40–129)
ALT SERPL W P-5'-P-CCNC: 46 U/L (ref 10–50)
ANION GAP SERPL CALCULATED.3IONS-SCNC: 13 MMOL/L (ref 7–15)
AST SERPL W P-5'-P-CCNC: 23 U/L (ref 10–50)
BASOPHILS # BLD AUTO: 0.1 10E3/UL (ref 0–0.2)
BASOPHILS NFR BLD AUTO: 1 %
BILIRUB SERPL-MCNC: 0.4 MG/DL
BUN SERPL-MCNC: 8.4 MG/DL (ref 6–20)
CALCIUM SERPL-MCNC: 9.7 MG/DL (ref 8.6–10)
CHLORIDE SERPL-SCNC: 101 MMOL/L (ref 98–107)
CREAT SERPL-MCNC: 0.97 MG/DL (ref 0.67–1.17)
DEPRECATED HCO3 PLAS-SCNC: 25 MMOL/L (ref 22–29)
EOSINOPHIL # BLD AUTO: 0.2 10E3/UL (ref 0–0.7)
EOSINOPHIL NFR BLD AUTO: 2 %
ERYTHROCYTE [DISTWIDTH] IN BLOOD BY AUTOMATED COUNT: 13 % (ref 10–15)
GFR SERPL CREATININE-BSD FRML MDRD: >90 ML/MIN/1.73M2
GLUCOSE SERPL-MCNC: 89 MG/DL (ref 70–99)
HCT VFR BLD AUTO: 45.5 % (ref 40–53)
HGB BLD-MCNC: 14.6 G/DL (ref 13.3–17.7)
IMM GRANULOCYTES # BLD: 0 10E3/UL
IMM GRANULOCYTES NFR BLD: 0 %
LYMPHOCYTES # BLD AUTO: 2.1 10E3/UL (ref 0.8–5.3)
LYMPHOCYTES NFR BLD AUTO: 20 %
MCH RBC QN AUTO: 28.5 PG (ref 26.5–33)
MCHC RBC AUTO-ENTMCNC: 32.1 G/DL (ref 31.5–36.5)
MCV RBC AUTO: 89 FL (ref 78–100)
MONOCYTES # BLD AUTO: 0.7 10E3/UL (ref 0–1.3)
MONOCYTES NFR BLD AUTO: 7 %
NEUTROPHILS # BLD AUTO: 7 10E3/UL (ref 1.6–8.3)
NEUTROPHILS NFR BLD AUTO: 70 %
NRBC # BLD AUTO: 0 10E3/UL
NRBC BLD AUTO-RTO: 0 /100
PLATELET # BLD AUTO: 293 10E3/UL (ref 150–450)
POTASSIUM SERPL-SCNC: 4.2 MMOL/L (ref 3.4–5.3)
PROT SERPL-MCNC: 7.2 G/DL (ref 6.4–8.3)
RBC # BLD AUTO: 5.12 10E6/UL (ref 4.4–5.9)
SODIUM SERPL-SCNC: 139 MMOL/L (ref 136–145)
TSH SERPL DL<=0.005 MIU/L-ACNC: 2 UIU/ML (ref 0.3–4.2)
WBC # BLD AUTO: 10.1 10E3/UL (ref 4–11)

## 2023-04-29 PROCEDURE — 85025 COMPLETE CBC W/AUTO DIFF WBC: CPT | Performed by: STUDENT IN AN ORGANIZED HEALTH CARE EDUCATION/TRAINING PROGRAM

## 2023-04-29 PROCEDURE — 250N000013 HC RX MED GY IP 250 OP 250 PS 637: Performed by: PSYCHIATRY & NEUROLOGY

## 2023-04-29 PROCEDURE — 36415 COLL VENOUS BLD VENIPUNCTURE: CPT | Performed by: STUDENT IN AN ORGANIZED HEALTH CARE EDUCATION/TRAINING PROGRAM

## 2023-04-29 PROCEDURE — 80053 COMPREHEN METABOLIC PANEL: CPT | Performed by: STUDENT IN AN ORGANIZED HEALTH CARE EDUCATION/TRAINING PROGRAM

## 2023-04-29 PROCEDURE — 250N000013 HC RX MED GY IP 250 OP 250 PS 637: Performed by: EMERGENCY MEDICINE

## 2023-04-29 PROCEDURE — G0378 HOSPITAL OBSERVATION PER HR: HCPCS

## 2023-04-29 PROCEDURE — 84443 ASSAY THYROID STIM HORMONE: CPT | Performed by: STUDENT IN AN ORGANIZED HEALTH CARE EDUCATION/TRAINING PROGRAM

## 2023-04-29 PROCEDURE — 99238 HOSP IP/OBS DSCHRG MGMT 30/<: CPT | Performed by: STUDENT IN AN ORGANIZED HEALTH CARE EDUCATION/TRAINING PROGRAM

## 2023-04-29 RX ORDER — ACETAMINOPHEN 325 MG/1
650 TABLET ORAL EVERY 4 HOURS PRN
Status: DISCONTINUED | OUTPATIENT
Start: 2023-04-29 | End: 2023-04-30 | Stop reason: HOSPADM

## 2023-04-29 RX ORDER — OLANZAPINE 10 MG/2ML
10 INJECTION, POWDER, FOR SOLUTION INTRAMUSCULAR 2 TIMES DAILY PRN
Status: DISCONTINUED | OUTPATIENT
Start: 2023-04-29 | End: 2023-04-30 | Stop reason: HOSPADM

## 2023-04-29 RX ORDER — TRAZODONE HYDROCHLORIDE 50 MG/1
50 TABLET, FILM COATED ORAL
Status: DISCONTINUED | OUTPATIENT
Start: 2023-04-29 | End: 2023-04-30 | Stop reason: HOSPADM

## 2023-04-29 RX ORDER — ONDANSETRON 4 MG/1
4 TABLET, ORALLY DISINTEGRATING ORAL EVERY 6 HOURS PRN
Status: DISCONTINUED | OUTPATIENT
Start: 2023-04-29 | End: 2023-04-30 | Stop reason: HOSPADM

## 2023-04-29 RX ORDER — HYDROXYZINE HYDROCHLORIDE 50 MG/1
50 TABLET, FILM COATED ORAL EVERY 6 HOURS PRN
Status: DISCONTINUED | OUTPATIENT
Start: 2023-04-29 | End: 2023-04-30 | Stop reason: HOSPADM

## 2023-04-29 RX ORDER — IBUPROFEN 600 MG/1
600 TABLET, FILM COATED ORAL EVERY 6 HOURS PRN
Status: DISCONTINUED | OUTPATIENT
Start: 2023-04-29 | End: 2023-04-30 | Stop reason: HOSPADM

## 2023-04-29 RX ORDER — OLANZAPINE 10 MG/1
10 TABLET, ORALLY DISINTEGRATING ORAL 2 TIMES DAILY PRN
Status: DISCONTINUED | OUTPATIENT
Start: 2023-04-29 | End: 2023-04-30 | Stop reason: HOSPADM

## 2023-04-29 RX ADMIN — MELATONIN TAB 3 MG 3 MG: 3 TAB at 23:02

## 2023-04-29 RX ADMIN — QUETIAPINE FUMARATE 50 MG: 50 TABLET ORAL at 21:54

## 2023-04-29 ASSESSMENT — ACTIVITIES OF DAILY LIVING (ADL)
ADLS_ACUITY_SCORE: 35

## 2023-04-29 NOTE — ED NOTES
Pt slept uneventfully in sensory room through the night. No signs of distress noted. Respirations were even and unlabored. Will continue to monitor.

## 2023-04-29 NOTE — ED NOTES
"Patient spent majority of shift resting or sleeping in Sensory Room D. Pt reports he is feeling less anxious than yesterday but is still having \"intrusive thoughts of bad things.\" Pt states these thoughts are of bad things that have happened in the past. Pt denies SI. Pt is hopeful that he will feel ready to discharge tomorrow. Pt is sleeping in Sensory Room D; respirations even and unlabored. Nursing will continue to monitor.   "

## 2023-04-29 NOTE — PROGRESS NOTES
[facility] Group Progress Note    Client Name: Connor Soares  Date: April 29, 2023  Service Type:  Group Therapy  Session Start Time:  10:40am  Session End Time: 11am  Session Length: 20 mins  Attendees: other group members  Facilitator:@Cris Marsh MA, [facility]     Topic:   Check-in, Goal for the day & plan    Intervention:    Group process: support, challenge, affirm, psycho-education.     Response:  Patient did not participate in group as they were asleep.     Cris Marsh

## 2023-04-29 NOTE — TELEPHONE ENCOUNTER
S: PAULINA Guallpa  Jacque calling at 4pm about a 33 year old/Male presenting with psychosis        B: Pt arrived via EMS. Presenting problem, stressors: Patient presented to the emergency department after a bystander contacted 911 due to concern that patient was behaving strangely. The bystander told EMS that patient had stated that he was looking for a dog that he lost 7 years ago and was not answering questions appropriately. Pt is presenting with delusions and reports there is a bug in his stomach that has been there for years and it sends him special messages. Pt reports increasing depression, unhoused, AH and VH of signs and songs, and he is not taking his medications.     Pt affect in ED: Guarded  Pt Dx: Major Depressive Disorder w/ psychotic features  Previous IPMH hx? Yes: 2019  Pt denies SI   Hx of suicide attempt? No  Pt denies SIB  Pt denies HI   Pt endorses auditory hallucinations  and endorses visual hallucination .   Pt RARS Score: 4    Hx of aggression/violence, sexual offenses, legal concerns, Epic care plan? describe: No  Current concerns for aggression this visit? No, frustrated but mostly cooperative  Does pt have a history of Civil Commitment? No  Is Pt their own guardian? Yes    Pt is prescribed medication. Is patient medication compliant? No  Pt endorses OP services:   CD concerns: None  Acute or chronic medical concerns: obese   Does Pt present with specific needs, assistive devices, or exclusionary criteria? Medical bed requirement, must notify CRN      Pt is ambulatory  Pt is able to perform ADLs independently      A: Pt to be reviewed for [facility] admission. Pt is Voluntary  Preferred placement: Statewide    COVID:Negative  Utox: Ordered, not yet collected   CMP: Ordered, not yet collected   CBC: Ordered, not yet collected     R: Patient cleared and ready for behavioral bed placement: Yes  Pt placed on IP worklist? Yes

## 2023-04-29 NOTE — ED PROVIDER NOTES
"[facility] Unit - Psychiatric Consultation  [facility] Emergency Department    Connor Soares MRN: [id number]   Age: 33 year old YOB: 1990     History     Chief Complaint   Patient presents with     Depression     HPI  Connor Soares is a 33 year old male with a past history notable for major depressive disorder versus schizoaffective disorder, depressive type, anxiety, and autism spectrum disorder. Patient presented to the emergency department after a bystander contacted 911 due to concern that patient was behaving strangely. The bystander told EMS that patient had stated that he was looking for a dog that he lost 7 years ago and was not answering questions appropriately. Patient was medically evaluated in the emergency department and found to be medically stable for transfer to [facility] for further psychiatric assessment.     Pt is being reevaluated today. No acute issues overnight. Pt thinks the quetiapine is helping reduce how the bug in his abdomen \"messes with my thoughts.\" He says \"I was not well when I came in but I am feeling better now.\"  He says the bug has been there for years. On a scale of 0 (not bothered) to 10 (Extremely bothered), the bug makes him feel 10/10 bothered. He says the bug sends him special messages through music or the TV or radio. \"Its hard to explain.\" He says the bug causes other people to read his mind. The bug helps him read other peoples' minds, too. He says he needs \"to do anything to get it out.\" He pats his abdomen. The bug doesn't talk to him; he denies A/VH or command hallucinations from the bug.     Quetiapine causes some grogginess in the mornings but it is not intolerable. He does think it helps with sleep. He has not used daytime PRN doses. He used to take semaglutide for weight mgmt but stopped it because he doesn't like needles. He stopped aripiprazole Feb 1, 2023 and says the bug started messing with his thoughts more after he stopped it.     He has been " "homeless for about 10 years. He hasn't had an [employer] worker or CADI waiver. He says he finds places to sleep, ideally indoors. He doesn't have any friends on the street. He's not close with his family that are local.     Past Medical History  No past medical history on file.  No past surgical history on file.  ARIPiprazole (ABILIFY) 2 MG tablet  Semaglutide (OZEMPIC, 2 MG/DOSE, SC)  traZODone (DESYREL) 50 MG tablet      No Known Allergies  Family History  No family history on file.  Social History           Review of Systems  A medically appropriate review of systems was performed with pertinent positives and negatives noted in the HPI, and all other systems negative.    Physical Examination   BP: (!) 154/74  Pulse: 114  Temp: 98  F (36.7  C)  Resp: 16  Height: 175.3 cm (5' 9\")  Weight: (!) 172.4 kg (380 lb)  SpO2: 98 %    Physical Exam  General: Appears stated age.   Neuro: Alert and fully oriented. Extremities appear to demonstrate normal strength on visual inspection.   Integumentary/Skin: no rash visualized, normal color    Psychiatric Examination   Appearance: awake, alert, adequately groomed, dressed in hospital scrubs and morbidly obese  Attitude:  cooperative  Eye Contact:  intense  Mood:  good  Affect:  restricted range  Speech:  clear, coherent  Psychomotor Behavior:  no evidence of tardive dyskinesia, dystonia, or tics  Thought Process:  linear  Associations:  no loose associations  Thought Content:  no evidence of suicidal ideation or homicidal ideation and delusional thought content; thought broadcasting; ideas of reference  Insight:  fair  Judgement:  fair  Oriented to:  time, person, and place  Attention Span and Concentration:  intact  Recent and Remote Memory:  intact  Language: able to name/identify objects without impairment  Fund of Knowledge: intact with awareness of current and past events    ED Course        Labs Ordered and Resulted from Time of ED Arrival to Time of ED Departure   COVID-19 " VIRUS (CORONAVIRUS) BY PCR - Normal       Result Value    SARS CoV2 PCR Negative         Assessments & Plan (with Medical Decision Making)   Patient presenting with delusional thoughts, ideas of reference, thought broadcasting r/t a bug in his abdomen that has been present for years. The bug affects his thoughts and has worsened since stopping aripiprazole in Feb. Quetiapine seems mildly helpful. I recommended referral to inpatient for ongoing medication stabilization for his psychotic symptoms. I suspect he needs a few more days to manage the quetiapine dose to better control his delusional symptoms. He agrees to voluntary inpatient care. Nursing notes reviewed noting no acute issues.     I have reviewed the assessment completed by the [facility].     Preliminary diagnosis:    ICD-10-CM    1. Delusions (H)  F22       2. Moderate episode of recurrent major depressive disorder (H)  F33.1     r/o psychotic features, r/o schizoaffective disorder, depressive type      3. Autism spectrum disorder  F84.0       4. Anxiety  F41.9            Treatment Plan:  -continue quetiapine 50mg at bedtime  -start quetiapine 12.5mg qAM for delusions; consider dose increase if not too sedating or low dose ineffective  -Continue quetiapine 25 mg tid prn for anxiety, agitation, sleep  -order basic labs prior to refer to inpt care  -PRNs available  -refer to inpatient care for psychotic/delusionl symptoms    --  Loree Mena NP   [facility] EMERGENCY DEPT  EmPATH Unit  4/25/2023      Loree Mena NP  04/29/23 1523

## 2023-04-29 NOTE — PLAN OF CARE
Connor Soares  April 29, 2023  Plan of Care Hand-off Note     Patient Care Path: Inpatient Mental Health.    Plan for Care:     Writer called Central Intake at approximately 4:10pm on Saturday, April 29th seeking a voluntary bed placement for Connor. He is open to any open beds in and out of the [place] area.      Despite denying suicidal or homicidal ideation, Connor presents with increasing, ongoing delusions as well as increased depression. After staying several nights at [facility], attending recommended IP services to stabilize Connor, and he is voluntary at this time.     Critical Safety Issues: delusions     Overview:  This patient is a child/adolescent: No    This patient has additional special visitor precautions: No    Legal Status: Voluntary    Contacts:        Psychiatry Consult:  Psychiatry Consult not requested because seen by attending provider at [facility] Yanna Reyes, LUANNE

## 2023-04-29 NOTE — ED NOTES
IP MH Referral Acuity Rating Score (RARS)    LMHP complete at referral to IP MH, with DEC; and, daily while awaiting IP MH placement. Call score to PPS.  CRITERIA SCORING   New 74 HH and Involuntary for IP MH (not adolescent) 0/1   Boarding over 24 hours 1/1   Vulnerable adult at least 55+ with multiple co morbidities; or, Patient age 11 or under 1/1   Suicide ideation without relief of precipitating factors 0/1   Current plan for suicide 0/1   Current plan for homicide 0/1   Imminent risk or actual attempt to seriously harm another without relief of factors precipitating the attempt 0/1   Severe dysfunction in daily living (ex: complete neglect for self care, extreme disruption in vegetative function, extreme deterioration in social interactions) 1/1   Recent (last 2 weeks) or current physical aggression in the ED 0/1   Restraints or seclusion episode in ED 0/1   Verbal aggression, agitation, yelling, etc., while in the ED 0/1   Active psychosis with psychomotor agitation or catatonia 1/1   Need for constant or near constant redirection (from leaving, from others, etc).  0/1   Intrusive or disruptive behaviors 0/1   TOTAL Acuity Total Score: 4

## 2023-04-29 NOTE — ED NOTES
Pt resting in Sensory Room D. Pt denies any SI/HI/[place]. Pt requested staff order dinner for him as he did not know what he wanted. Pt voiced no complaints at the moment.

## 2023-04-30 ENCOUNTER — HOSPITAL ENCOUNTER (INPATIENT)
Facility: HOSPITAL | Age: 33
LOS: 16 days | Discharge: HOME | DRG: 885 | End: 2023-05-16
Attending: STUDENT IN AN ORGANIZED HEALTH CARE EDUCATION/TRAINING PROGRAM | Admitting: STUDENT IN AN ORGANIZED HEALTH CARE EDUCATION/TRAINING PROGRAM
Payer: MEDICARE

## 2023-04-30 VITALS
RESPIRATION RATE: 18 BRPM | SYSTOLIC BLOOD PRESSURE: 138 MMHG | HEART RATE: 110 BPM | TEMPERATURE: 98.5 F | OXYGEN SATURATION: 97 % | WEIGHT: 315 LBS | DIASTOLIC BLOOD PRESSURE: 84 MMHG | BODY MASS INDEX: 46.65 KG/M2 | HEIGHT: 69 IN

## 2023-04-30 DIAGNOSIS — F33.3 SEVERE EPISODE OF RECURRENT MAJOR DEPRESSIVE DISORDER, WITH PSYCHOTIC FEATURES (H): ICD-10-CM

## 2023-04-30 DIAGNOSIS — S92.321D CLOSED DISPLACED FRACTURE OF SECOND METATARSAL BONE OF RIGHT FOOT WITH ROUTINE HEALING, SUBSEQUENT ENCOUNTER: Primary | ICD-10-CM

## 2023-04-30 PROCEDURE — 124N000001 HC R&B MH

## 2023-04-30 PROCEDURE — 250N000013 HC RX MED GY IP 250 OP 250 PS 637: Performed by: STUDENT IN AN ORGANIZED HEALTH CARE EDUCATION/TRAINING PROGRAM

## 2023-04-30 PROCEDURE — G0378 HOSPITAL OBSERVATION PER HR: HCPCS

## 2023-04-30 PROCEDURE — 250N000013 HC RX MED GY IP 250 OP 250 PS 637: Performed by: PSYCHIATRY & NEUROLOGY

## 2023-04-30 PROCEDURE — 250N000013 HC RX MED GY IP 250 OP 250 PS 637: Performed by: NURSE PRACTITIONER

## 2023-04-30 RX ORDER — QUETIAPINE FUMARATE 50 MG/1
50 TABLET, FILM COATED ORAL AT BEDTIME
Status: DISCONTINUED | OUTPATIENT
Start: 2023-04-30 | End: 2023-05-01

## 2023-04-30 RX ORDER — HYDROXYZINE HYDROCHLORIDE 25 MG/1
25 TABLET, FILM COATED ORAL EVERY 4 HOURS PRN
Status: DISCONTINUED | OUTPATIENT
Start: 2023-04-30 | End: 2023-05-16 | Stop reason: HOSPADM

## 2023-04-30 RX ORDER — OLANZAPINE 10 MG/1
10 TABLET ORAL 3 TIMES DAILY PRN
Status: DISCONTINUED | OUTPATIENT
Start: 2023-04-30 | End: 2023-05-16 | Stop reason: HOSPADM

## 2023-04-30 RX ORDER — MAGNESIUM HYDROXIDE/ALUMINUM HYDROXICE/SIMETHICONE 120; 1200; 1200 MG/30ML; MG/30ML; MG/30ML
30 SUSPENSION ORAL EVERY 4 HOURS PRN
Status: DISCONTINUED | OUTPATIENT
Start: 2023-04-30 | End: 2023-05-16 | Stop reason: HOSPADM

## 2023-04-30 RX ORDER — OLANZAPINE 10 MG/2ML
10 INJECTION, POWDER, FOR SOLUTION INTRAMUSCULAR 3 TIMES DAILY PRN
Status: DISCONTINUED | OUTPATIENT
Start: 2023-04-30 | End: 2023-05-16 | Stop reason: HOSPADM

## 2023-04-30 RX ORDER — ACETAMINOPHEN 325 MG/1
650 TABLET ORAL EVERY 4 HOURS PRN
Status: DISCONTINUED | OUTPATIENT
Start: 2023-04-30 | End: 2023-05-16 | Stop reason: HOSPADM

## 2023-04-30 RX ADMIN — OLANZAPINE 10 MG: 10 TABLET, FILM COATED ORAL at 14:02

## 2023-04-30 RX ADMIN — QUETIAPINE 12.5 MG: 25 TABLET, FILM COATED ORAL at 07:41

## 2023-04-30 RX ADMIN — QUETIAPINE FUMARATE 50 MG: 50 TABLET ORAL at 20:25

## 2023-04-30 RX ADMIN — ACETAMINOPHEN 325MG 650 MG: 325 TABLET ORAL at 17:31

## 2023-04-30 ASSESSMENT — ACTIVITIES OF DAILY LIVING (ADL)
ADLS_ACUITY_SCORE: 35
WALKING_OR_CLIMBING_STAIRS_DIFFICULTY: NO
CONCENTRATING,_REMEMBERING_OR_MAKING_DECISIONS_DIFFICULTY: YES
ADLS_ACUITY_SCORE: 29
DIFFICULTY_EATING/SWALLOWING: NO
TOILETING_ISSUES: NO
CHANGE_IN_FUNCTIONAL_STATUS_SINCE_ONSET_OF_CURRENT_ILLNESS/INJURY: NO
ADLS_ACUITY_SCORE: 35
ADLS_ACUITY_SCORE: 29
DOING_ERRANDS_INDEPENDENTLY_DIFFICULTY: NO
ADLS_ACUITY_SCORE: 29
WEAR_GLASSES_OR_BLIND: NO
ADLS_ACUITY_SCORE: 29
FALL_HISTORY_WITHIN_LAST_SIX_MONTHS: NO
DRESSING/BATHING_DIFFICULTY: NO
ADLS_ACUITY_SCORE: 45
ADLS_ACUITY_SCORE: 29

## 2023-04-30 NOTE — PLAN OF CARE
"Problem: Adult Behavioral Health Plan of Care  Goal: Patient-Specific Goal (Individualization)  Description: Pt. Will follow recommendations of treatment team during hospital stay.   Pt. Will sleep 6-8 hours a night during hospital stay.  Pt. Will maintain ADLs without prompting during hospital stay.  Pt. Will be free from self harm during hospital stay.   Pt. Will attend > 50% of unit programing during hospital stay.     Outcome: Progressing   ADMISSION NOTE    Reason for admission: Psychosis  Safety concerns: unknown.  Risk for or history of violence: Yes, per pt.     Patient arrived on unit from [facility] ED accompanied by EMT x 2 and [place] security x 2 on 4/30/2023  1207 PM.   Status on arrival: Calm cooperative  There were no vitals taken for this visit.  Patient given tour of unit and Welcome to  unit papers given to patient, wanding completed, belongings inventoried, and admission assessment not completed R/T unit acuity.   Patient's legal status on arrival is Voluntary. Appropriate legal rights discussed with and copy given to patient. Patient Bill of Rights discussed with and copy given to patient.   Patient denies SI, HI, and thoughts of self harm and contracts for safety while on unit.       Full Skin Assessment WDL. Pt. Skin. Clean and intact.     Pt. Reporting he is here \"I have a stomach bug.\" When asking pt. To elaborate on this he states \"You wouldn't understand. It's a bug.\" Pt. Holding URQ the entire assessment. Pt. Denies any pain but griminess and holds URQ when he moves. Pt. Has difficult time answering all questions. Delayed response. Appears responding to internal stimuli, pt. Denies. Pt. Reports he has been on multiple medications, has a history of violence, ETOH and drug abuse. He reports he has been to IP and OP treatment, but he can not remember any details. \"The last place gave me Seroquel to sleep. Pt. Requesting/Administered Lunch and PRN Zyprexa 10 mg po @ 1402. Pt. Appears to be " resting in bed.     Face to face end of shift report communicated to oncoming RN.     Duyen Garcia RN  4/30/2023

## 2023-04-30 NOTE — ED NOTES
"Pt reports the bug is still within his stomach. Pt reports it just makes him think about his past. Pt states it \"makes me fight the humanity within me.\" Pt denies it makes any commands.   "

## 2023-04-30 NOTE — ED NOTES
Pt report was given to MILENA Vega. Transport was notified of transfer. Transport's ETA is 7:45 am.     Detail Level: Zone

## 2023-04-30 NOTE — ED NOTES
Pt slept through the night uneventfully. No signs of distress noted. Respirations appeared to be even and unlabored.

## 2023-04-30 NOTE — PROGRESS NOTES
EMS here to transfer patient. Pt agreeable to plan, he independently walks to and gets on stretcher without issue. /84, , O2 97%. Denying SI. Receiving unit called with update on ETA. RN report given to Duyen.  Pt transferred to [place] unit [address] [place] [address].

## 2023-04-30 NOTE — TELEPHONE ENCOUNTER
[facility] Access Inpatient Bed Call Log 4/29/2023 2:20 PM      Intake has called facilities that have not updated their bed status within the last 12 hours.        Adults:         [facility] is posting 0 beds.      [facility] is posting 0 [phone]      [facility] is posting 0 beds. [phone]     [facility] is posting 0 beds. [phone]     [facility] is posting 0 beds. [phone]     [facility] is posting 0 beds. [phone]     [facility] is posting 0 beds. [phone]     [facility] is posting 0 beds. [phone]     [facility]  0 [phone]     [facility] is posting 0 beds. [phone]         [facility] is posting 2 beds. Mixed unit 12+. Low acuity only.  [phone]     [facility] is positing 0 beds. No aggression.  [phone]     [facility] is posting 0 beds. [phone]     [facility] is posting 0 beds. Low acuity only. [phone] [facility] is posting 0 beds. [phone]     [facility] is posting 0 beds. Low acuity. [phone] Per website @6:17am     [facility] is posting 2 bed. 72 hr hold preferred. [phone] [facility] is posting 0 beds. [phone]      [facility] is posting 7 beds. Vol only, No Hx of aggression, violence, or assault. No sexual offenders. No 72 hr holds. [phone]          [facility] is posting 9 beds. (Must have the cognitive ability to do programming. No aggressive or violent behavior or recent HX in the last 2 yrs. MH must be primary.) [phone]      [facility] is posting 0 beds. Low acuity only. Violence and aggression capped. [phone]      [facility] is posting 3 beds. Low acuity, Neg Covid. [phone]      [facility] is posting 1 beds. Covid neg. Vol only. Combined adolescent and adult unit. No aggressive or violent behavior. No registered sex offenders. [phone]     [facility] stated that there are beds available, would not state how many.     [facility] posting 3 beds. Negative covid.     [facility] is posting  adult/adol beds. Call for details. [phone].     [facility] is posting  beds . [phone]     Pt remains on work list pending appropriate bed availability.

## 2023-04-30 NOTE — PROGRESS NOTES
04/30/23 1756   Patient Belongings   Did you bring any home meds/supplements to the hospital?  No   Patient Belongings locker;sent to security per site process   Patient Belongings Put in Hospital Secure Location (Security or Locker, etc.) other (see comments)  (Scrub Top and Bottom, Pants, Underwear, Socks, Shirt, Sweatshirt, Glasses, Shoes)   Belongings Search Yes   Clothing Search Yes   Second Staff Ama DANGELO     List items sent to safe: Keyring w/ Key, Wallet w/ Misc Cards, [place] Drivers License x2, Debit Card, Social Security Card, Immunization Card  All other belongings put in assigned cubby in belongings room.     I have reviewed my belongings list on admission and verify that it is correct.     Patient signature_______________________________    Second staff witness (if patient unable to sign) ______________________________       I have received all my belongings at discharge.    Patient signature________________________________    ARELI  4/30/2023  6:00 PM

## 2023-05-01 PROCEDURE — 250N000013 HC RX MED GY IP 250 OP 250 PS 637: Performed by: PSYCHIATRY & NEUROLOGY

## 2023-05-01 PROCEDURE — 124N000001 HC R&B MH

## 2023-05-01 PROCEDURE — 99223 1ST HOSP IP/OBS HIGH 75: CPT | Mod: AI | Performed by: PSYCHIATRY & NEUROLOGY

## 2023-05-01 PROCEDURE — 250N000013 HC RX MED GY IP 250 OP 250 PS 637: Performed by: NURSE PRACTITIONER

## 2023-05-01 RX ORDER — QUETIAPINE FUMARATE 25 MG/1
25 TABLET, FILM COATED ORAL 2 TIMES DAILY PRN
Status: DISCONTINUED | OUTPATIENT
Start: 2023-05-01 | End: 2023-05-16 | Stop reason: HOSPADM

## 2023-05-01 RX ORDER — QUETIAPINE FUMARATE 25 MG/1
12.5 TABLET, FILM COATED ORAL EVERY MORNING
Status: ON HOLD | COMMUNITY
End: 2023-05-15

## 2023-05-01 RX ORDER — ALBUTEROL SULFATE 90 UG/1
2 AEROSOL, METERED RESPIRATORY (INHALATION) 4 TIMES DAILY PRN
Status: ON HOLD | COMMUNITY
End: 2023-05-15

## 2023-05-01 RX ORDER — ACETAMINOPHEN 325 MG/1
650 TABLET ORAL DAILY PRN
Status: ON HOLD | COMMUNITY
End: 2024-01-25

## 2023-05-01 RX ORDER — QUETIAPINE FUMARATE 25 MG/1
25 TABLET, FILM COATED ORAL 3 TIMES DAILY PRN
Status: ON HOLD | COMMUNITY
End: 2023-05-15

## 2023-05-01 RX ORDER — SEMAGLUTIDE 2.68 MG/ML
0.75 INJECTION, SOLUTION SUBCUTANEOUS WEEKLY
Status: ON HOLD | COMMUNITY
Start: 2023-04-10 | End: 2023-05-15

## 2023-05-01 RX ORDER — QUETIAPINE FUMARATE 50 MG/1
50 TABLET, FILM COATED ORAL AT BEDTIME
Status: ON HOLD | COMMUNITY
End: 2023-05-15

## 2023-05-01 RX ORDER — QUETIAPINE FUMARATE 100 MG/1
100 TABLET, FILM COATED ORAL AT BEDTIME
Status: DISCONTINUED | OUTPATIENT
Start: 2023-05-01 | End: 2023-05-02

## 2023-05-01 RX ADMIN — QUETIAPINE FUMARATE 25 MG: 25 TABLET ORAL at 09:09

## 2023-05-01 RX ADMIN — QUETIAPINE 100 MG: 100 TABLET ORAL at 20:38

## 2023-05-01 RX ADMIN — HYDROXYZINE HYDROCHLORIDE 25 MG: 25 TABLET, FILM COATED ORAL at 08:24

## 2023-05-01 ASSESSMENT — ACTIVITIES OF DAILY LIVING (ADL)
ADLS_ACUITY_SCORE: 29
DRESS: INDEPENDENT
ADLS_ACUITY_SCORE: 29
DRESS: SCRUBS (BEHAVIORAL HEALTH);INDEPENDENT
ADLS_ACUITY_SCORE: 29
HYGIENE/GROOMING: INDEPENDENT
ADLS_ACUITY_SCORE: 29
ADLS_ACUITY_SCORE: 29
ORAL_HYGIENE: INDEPENDENT
ORAL_HYGIENE: INDEPENDENT
ADLS_ACUITY_SCORE: 29
ADLS_ACUITY_SCORE: 29
HYGIENE/GROOMING: INDEPENDENT
ADLS_ACUITY_SCORE: 29

## 2023-05-01 NOTE — H&P
"  [facility]  -  HISTORY AND PHYSICAL     ADMISSION DATA     Connor Soares MRN# [id number]   Age: 33 year old YOB: 1990     Date of Admission: 4/30/2023  Primary Physician: No Ref-Primary, Physician   Referral Area: Referral Area: Outside Emergency Department       CHIEF COMPLAINT   Reason for Admit/Consult: Depressive symptoms and Psychosis / robert symptoms       HISTORY OF PRESENT ILLNESS   Connor Soares is a 33 year old male with a past psychiatric history notable for MDD with psychotic features versus schizoaffective disorder and autism spectrum disorder. Multiple prior inpatient psychiatric hospitalizations. Medication nonadherence.  Presented to outside emergency department accompanied by law enforcement after being found disorganized in the community attempting to search for his dog that he lost over 7 years ago. He was subsequently sent to [place] unit and started on Seroquel 50 mg at bedtime. Patient was subsequently transferred and accepted for inpatient psychiatric hospitalization at [facility] for further safety and further stabilization     Prior to seeing the patient, I had the opportunity to review the medical record, \"Connor Soares is a 33 year old male with a past history notable for major depressive disorder versus schizoaffective disorder, depressive type, anxiety, and autism spectrum disorder. Patient presented to the emergency department after a bystander contacted 911 due to concern that patient was behaving strangely. The bystander told EMS that patient had stated that he was looking for a dog that he lost 7 years ago and was not answering questions appropriately. Patient was medically evaluated in the emergency department and found to be medically stable for transfer to [facility] for further psychiatric assessment.      Pt is being reevaluated today. No acute issues overnight. Pt thinks the quetiapine is " "helping reduce how the bug in his abdomen \"messes with my thoughts.\" He says \"I was not well when I came in but I am feeling better now.\"  He says the bug has been there for years. On a scale of 0 (not bothered) to 10 (Extremely bothered), the bug makes him feel 10/10 bothered. He says the bug sends him special messages through music or the TV or radio. \"Its hard to explain.\" He says the bug causes other people to read his mind. The bug helps him read other peoples' minds, too. He says he needs \"to do anything to get it out.\" He pats his abdomen. The bug doesn't talk to him; he denies A/VH or command hallucinations from the bug.      Quetiapine causes some grogginess in the mornings but it is not intolerable. He does think it helps with sleep. He has not used daytime PRN doses. He used to take semaglutide for weight mgmt but stopped it because he doesn't like needles. He stopped aripiprazole Feb 1, 2023 and says the bug started messing with his thoughts more after he stopped it.     He has been homeless for about 10 years. He hasn't had an [employer] worker or [employer] waiver. He says he finds places to sleep, ideally indoors. He doesn't have any friends on the street. He's not close with his family that are local. \"    HPI  Connor Soares is a 33 year old male with a past history notable for major depressive disorder versus schizoaffective disorder, depressive type, anxiety, and autism spectrum disorder. Patient presented to the emergency department after a bystander contacted 911 due to concern that patient was behaving strangely. The bystander told EMS that patient had stated that he was looking for a dog that he lost 7 years ago and was not answering questions appropriately. Patient was medically evaluated in the emergency department and found to be medically stable for transfer to [facility] for further psychiatric assessment.      Here at [facility], patient has been resting in his recliner chair for most of the day. " "Nursing notes reviewed, report received from [facility]. On interview, patient begins to only answering questions by shaking his head. When asked how he is feeling, he gives a \"thumbs up.\" When discussing writer's understanding of the events leading to his emergency department visit, patient simply began shaking his head \"no\" as though the described events were not accurate. When asked for his account of the events, he does not answer. When asked if recalls speaking with the [facility] earlier today, he shakes his head \"no.\" When asked if he sees a psychiatric provider, he shakes his head \"no,\" despite recent notes indicating that he sees a psychiatric provider named Karen Vizcaino at [facility]. Began to discuss how we can best help patient while he is here. He agrees that he would like treatment for his depression. At this point, he begins speaking. He reports that he would like a normal life where he wakes up, brushes his teeth, showers, puts on clothes, does the dishes, takes out the trash. Reports he would like more of a purpose, and does not want to waste his life away. He then mentions he would like to go to [place], but does not have a plan to get there. He also says he could go to a hotel from here but it is unclear if he is able to pay for a hotel room. He is not currently interested in a crisis residence or shelter. We begin discussing treatment for his depression. Writer begins listing antidepressants he has heard of or taken. Patient declines most medications listed. Reports he had tried escitalopram, sertraline, fluoxetine. Had a bad reaction to Effexor and began laughing when this was mentioned, but declined to discuss his experience. Patient then states \"you could list all the antidepressants in the world and I probably wouldn't be open to taking any of them.\" We discussed to have patient stay overnight on observation and reassess tomorrow, to which he was agreeable. He denies any suicidal or homicidal ideation. " "Denies auditory or visual hallucinations. He does not appear manic.      On psychiatric interview, he is met within his room. He confirms the above. He states that he has been feeling more depressed and he notes that e has had episodes of psychosis in the past. He notes that in the past he has driven a car to a \"random area\" in order to find someone and then began to walk around and knock on random doors to see if he could find this person.  He states that he does have difficulties at times distinguishing between reality.  He notes that he was taking aripiprazole and olanzapine in the past. He reports the seroquel has been helping him sleep but last night was not as effective. We talked about dosing of serqouel and he agrees to increase to 100 mg at bedtime noting that he will likely need a higher dose. He reports he feels safe in the hospital. He denies any paranoia while he is here. He states he is havin ga hard time \"Explaining things\" and is grateful to be here as he feels supported.        REVIEW OF PSYCHIATRIC SYSTEMS   I do not elicit symptoms consistent with generalized anxiety, agoraphobia, social anxiety, panic disorder, ADHD, an eating disorder and pain     REVIEW OF MEDICAL SYSTEMS   14-point medical review of systems is negative other than noted in the HPI.     PSYCHIATRIC HISTORY   Per chart review, pt has previous MH dx of MDD, recurrent, severe, with psychotic features, schizoaffective d/o, and ASD. Last  admission was 11/30/2019-12/11/2019. Pt was discharged with medications to an [facility] facility.      Pt confirms history of the following medications: Abilify, trazodone, vistaril, and Zyprexa. Pt reports he did not take the Zyprexa after his discharge because he did not like the way it made him feel. Pt reports he took Abilify for ~ 3 years, but discontinued 2/1/2023. Pt reports discontinuation of Trazodone ~3-4 weeks ago.      Pt reports he see an OP psychiatry provider, Zoya. Pt reports last appt " "\"a few weeks ago.\" Per chart review, last appt was 4/18/23. Pt reports he does not currently have an OP MH therapist. Pt reports he currently has a mental health , Arben Hua.        FAMILY HISTORY   No family history on file.      SUBSTANCE USE HISTORY   History   Drug Use Not on file       Social History    Substance and Sexual Activity      Alcohol use: Not on file      History   Smoking Status     Not on file   Smokeless Tobacco     Not on file            SOCIAL HISTORY   Social History     Socioeconomic History     Marital status: Single     Spouse name: Not on file     Number of children: Not on file     Years of education: Not on file     Highest education level: Not on file   Occupational History     Not on file   Tobacco Use     Smoking status: Not on file     Smokeless tobacco: Not on file   Substance and Sexual Activity     Alcohol use: Not on file     Drug use: Not on file     Sexual activity: Not on file   Other Topics Concern     Not on file   Social History Narrative     Not on file     Social Determinants of Health     Financial Resource Strain: Not on file   Food Insecurity: Not on file   Transportation Needs: Not on file   Physical Activity: Not on file   Stress: Not on file   Social Connections: Not on file   Intimate Partner Violence: Not on file   Housing Stability: Not on file     Currently lives in [place], [place]. Lives alone. No pets. Currently on social security disability for MDD with recurring psychosis.      Unclear whether he lives in [place] or is homeless    Pt reports he is currently not working and is homeless. Pt reports he has been homeless for ~12 years, and has been staying at shelters. Pt reports he does not have any family or friends who he currently remains in contact with.      Pt identifies hobbies of: playing video games, listening to music, going for walks. Pt also reports he likes animals and reports that he treats others well.         PAST MEDICAL " HISTORY   No past medical history on file.    No past surgical history on file.    Patient has no known allergies.     PHYSICAL EXAM   General: Awake and alert, NAD  HEENT: EOMI, no scleral icterus, no injection of conjunctivae, moist mucus membranes  Respiratory: Breathing comfortably   Extremities: No cyanosis, clubbing, or edema   Skin: No gross rash, no bruising  Neuro: CN II-XII intact, no focal deficits        VITALS   Vitals: /88   Pulse 101   Temp 98.6  F (37  C) (Tympanic)   Resp 18   Ht 1.829 m (6')   Wt (!) 170.1 kg (375 lb 1.6 oz)   SpO2 95%   BMI 50.87 kg/m       MENTAL STATUS EXAM   Appearance:  awake, alert, adequately groomed, dressed in hospital scrubs, and appeared as age stated  Attitude:  cooperative  Eye Contact:  good  Mood:  depressed  Affect:  mood congruent  Speech:  clear, coherent  Psychomotor Behavior:  no evidence of tardive dyskinesia, dystonia, or tics  Thought Process:  logical, linear, and goal oriented  Associations:  no loose associations  Thought Content:  no evidence of suicidal ideation or homicidal ideation  Insight:  limited  Judgment:  limited  Oriented to:  time, person, and place  Attention Span and Concentration:  intact  Recent and Remote Memory:  intact  Gait and Station: Normal       LABS   No results found for this or any previous visit (from the past 24 hour(s)).      ASSESSMENT   Summary: Connor Soares is a 33 year old male with a past psychiatric history notable for MDD with psychotic features versus schizoaffective disorder and autism spectrum disorder. Multiple prior inpatient psychiatric hospitalizations. Medication nonadherence.  Presented to outside emergency department accompanied by law enforcement after being found disorganized in the community attempting to search for his dog that he lost over 7 years ago. He was subsequently sent to [facility] unit and started on Seroquel 50 mg at bedtime. Patient was subsequently transferred and  accepted for inpatient psychiatric hospitalization at [facility] for further safety and further stabilization     Etiology of patient's presentation today is consistent with historic diagnosis. I am suspicious that this is MDD with psychotic features. I think his autism spectrum disorder is also evident in the way that he is communicating his distress but that his communication manner is not necessarily psychotic.  Will increase seroquel to 100 mg at bedtime        DIAGNOSTIC FORMULATION   #major depressive disorder with psychotic features  #autism spectrum disorder  #medication nonadherence     PLAN   Admit patient to [facility], Location: Unit 5     Legal Status: Orders Placed This Encounter      Legal status 72 Hour Hold    Safety Assessment:    Behavioral Orders   Procedures     Code 1 - Restrict to Unit     Routine Programming     As clinically indicated     Status 15     Every 15 minutes.      Imminent risk of harm in a setting less restrictive than an inpatient psychiatric facility is determined to be medium to high.       PTA medications held:   -none    PTA medications continued/changed:   -seroquel 50 mg at bedtime --> increased to 100 mg at bedtime (5/1/23)  -hydroxyzine PRN    New medications initiated:   -seroquel 25 mg BID PRN    Programming: Patient will be treated in a therapeutic milieu with appropriate individual and group therapies. Education will be provided on diagnoses, medications, and treatments.     Medical diagnoses:  Per medicine    Diagnostic studies and consultations:  No additional studies or tests recommended on admission.  Level of care required: Acute psychiatric hospitalization    Justification for hospitalization and estimated length of stay: reasons for hospitalization include potential safety risk to self or others within the last week, decreased functioning in outpatient setting and in the setting of no outpatient  management, need for highly structured inpatient management for stabilization of psychiatric symptoms, need for psychiatric medication initiation and stabilization.  Estimated length of stay is 4-7 days.      Total time: 80 minutes       ATTESTATION    Andrew Monteiro MD  [facility]   Psychiatry    Video Visit: Patient has given verbal consent for video visit?: Yes  Type of Service: video visit for mental health treatment  Reason for Video Visit: COVID-19 [place]  Originating Site (patient location): [facility]  Distant Site (provider location): Remote Location  Mode of Communication: Video Conference via [employer]  Time of Service: Date: May 1, 2023 , Start: 07:00 end: 08:00

## 2023-05-01 NOTE — PLAN OF CARE
Problem: Adult Behavioral Health Plan of Care  Goal: Patient-Specific Goal (Individualization)  Description: Pt. Will follow recommendations of treatment team during hospital stay.   Pt. Will sleep 6-8 hours a night during hospital stay.  Pt. Will maintain ADLs without prompting during hospital stay.  Pt. Will be free from self harm during hospital stay.   Pt. Will attend > 50% of unit programing during hospital stay.     Outcome: Progressing     Face to face shift report received from Bren ABBOTT. Rounding completed, pt observed.     Pt appeared to sleep most of this shift.    Face to face report will be communicated to oncoming RN.    Adriana Zapata RN  5/1/2023  5:47 AM

## 2023-05-01 NOTE — PLAN OF CARE
"PT was withdrawn and isolative to his room for most of the shift. He did come out and color briefly. He denies SI, and AH stating \"No that's gone\" He did not talk to me or mention his \"stomach bug\". He took tylenol for right shoulder pain and reports relief with medication.   He started Seroquel 50mg this shift.    Face to face end of shift report communicated to oncoming RN     Bren Mejia RN  4/30/2023  11:10 PM               Problem: Adult Behavioral Health Plan of Care  Goal: Patient-Specific Goal (Individualization)  Description: Pt. Will follow recommendations of treatment team during hospital stay.   Pt. Will sleep 6-8 hours a night during hospital stay.  Pt. Will maintain ADLs without prompting during hospital stay.  Pt. Will be free from self harm during hospital stay.   Pt. Will attend > 50% of unit programing during hospital stay.     Outcome: Progressing     Problem: Psychotic Signs/Symptoms  Goal: Improved Behavioral Control (Psychotic Signs/Symptoms)  Outcome: Progressing   Goal Outcome Evaluation:                        "

## 2023-05-01 NOTE — PLAN OF CARE
Problem: Adult Behavioral Health Plan of Care  Goal: Patient-Specific Goal (Individualization)  Description: Pt. Will follow recommendations of treatment team during hospital stay.   Pt. Will sleep 6-8 hours a night during hospital stay.  Pt. Will maintain ADLs without prompting during hospital stay.  Pt. Will be free from self harm during hospital stay.   Pt. Will attend > 50% of unit programing during hospital stay.     Outcome: Progressing  Note:     0830 Patient requested and given Hydroxyzine for complaints of anxiety 25 mg po. Patient then a few minutes later asked for Seroquel 25 mg po for anxiety. Given at 0920. Patient is pleasant but stays to self. Answers questions with one to two words only. Denies current use of Ozempic and states that he does not want to take it anymore. Patient ate well for breakfast meal in the lounge area then went back to bed. Calm and appropriate behavior.    1400 Patient in bed with eyes closed, woke up easily by writer and verified patient is doing ok and needed anything. Patient states he would like water and patient is encouraged to get up to the lounge to get his water and he agreed. Pleasant and smiling.    Face to face end of shift report communicated to 3-11 shift RN.     Josephine Rey RN  5/1/2023  2:26 PM          Problem: Suicidal Behavior  Goal: Suicidal Behavior is Absent or Managed  Outcome: Progressing      Goal Outcome Evaluation:    Plan of Care Reviewed With: patient

## 2023-05-01 NOTE — MEDICATION SCRIBE - ADMISSION MEDICATION HISTORY
Medication Scribe Admission Medication History    Admission medication history is complete. The information provided in this note is only as accurate as the sources available at the time of the update.    Medication reconciliation/reorder completed by provider prior to medication history? Yes    Information Source(s): Patient, Clinic records and CareEverywhere/SureScripts via in-person    Pertinent Information:     Patient stopped taking ozempic a couple of weeks ago. No specific reason why.     Unsure which pharmacy he would like at discharge.     Patient started on medications while in transferring ER that were continued at discharge:  -continue quetiapine 50mg at bedtime  -start quetiapine 12.5mg qAM for delusions; consider dose increase if not too sedating or low dose ineffective  -Continue quetiapine 25 mg tid prn for anxiety, agitation, sleep      Changes made to PTA medication list:    Added: seroquel 12.5 mg AM, 25 mg TID PRN and 50 mg HS    Deleted: abilify and trazodone     Changed: updated ozempic    Medication Affordability:  Not including over the counter (OTC) medications, was there a time in the past 12 months when you did not take your medications as prescribed because of cost?: No    Allergies reviewed with patient and updates made in EHR: yes    Medication History Completed By: Fani Ferraro 5/1/2023 11:35 AM    Prior to Admission medications    Medication Sig Last Dose Taking? Auth Provider Long Term End Date   albuterol (PROAIR HFA/PROVENTIL HFA/VENTOLIN HFA) 108 (90 Base) MCG/ACT inhaler Inhale 2 puffs into the lungs 4 times daily as needed More than a month Yes Reported, Patient Yes    QUEtiapine (SEROQUEL) 25 MG tablet Take 12.5 mg by mouth every morning 4/30/2023 at 0741 ER Yes Reported, Patient     QUEtiapine (SEROQUEL) 25 MG tablet Take 25 mg by mouth 3 times daily as needed (anxiety, sleep, agitation) 4/28/2023 at ER dose Yes Reported, Patient     QUEtiapine (SEROQUEL) 50 MG tablet Take 50  mg by mouth At Bedtime 4/29/2023 at 2154 ER dose Yes Reported, Patient     OZEMPIC, 2 MG/DOSE, 8 MG/3ML pen Inject 0.75 mLs Subcutaneous once a week (2 mg)  Patient not taking: Reported on 5/1/2023 Not Taking  Reported, Patient

## 2023-05-01 NOTE — PROGRESS NOTES
"      Social Service Psychosocial Assessment  Presenting Problem:  The patient is a 33 year old male that was admitted for psychosis. Patient reports to having a stomach bug that makes him think about his past. Patient stated in ER \" It make me fight the humanity within me.\"   Patient is his own guardian.  Marital Status:   Not    Spouse / Children:    Not / mo children   Psychiatric TX HX:     Past Psychiatric history Per Empath Unit Consultation note from [facility] ER dated 4/29/2023: Notable major depressive disorder versus schizoaffective disorder, depressive type, anxiety, and autism spectrum disorder. Patient presented to the emergency department after a bystander contacted 911 due to concern that patient was behaving strangely. The bystander told EMS that patient had stated that he was looking for a dog that he lost 7 years ago and was not answering questions appropriately.  Suicide Risk Assessment: The patient denies SI for admit, patient has a hx of SI but attempt, Patient denies SI today.   Access to Lethal Means (explain):   Patient denies access to lethal means.   Family Psych HX:   Unknown   A & Ox:   X 3  Medication Adherence:   Unknown, please refer to H&P  Medical Issues:   Unknown, please refer to H&P  Visual -Motor Functioning:   Ok, no issues noticed   Communication Skills /Needs:  Ok, no issues noticed   Ethnicity:   White     Spirituality/Yazidism Affiliation:  None  Clergy Request:   No   History:   None  Living Situation: Per  the patient recently moved into a new apartment, in process of getting furniture.  Pt reports he is  homeless. Pt reports he has been homeless for ~12 years.   ADL s:  Independently   Education:  Graduated High School   Financial Situation:   Currently unemployed   Occupation:  Pt reports he is currently not working.   Leisure & Recreation:  Playing video games, listening to music, going for walks. Pt also reports he likes animals and " reports that he treats others well.   Childhood History:  Not so good. Mostly bad.   Trauma Abuse HX:   Patient answered yes.   Relationship / Sexuality:  Patient stated he is bisexual   Substance Use/ Abuse:  Patient shared he had an issues in the past with some substances.   Chemical Dependency Treatment HX:   Patient stated he had issues in the past.   Legal Issues:   Patient denies having legal issues   Significant Life Events:  Being homeless. Having past CD issues. Coming out as bisexual.   Strengths:   In a safe place. Case management helped the patient get housing - an apartment.   Challenges /Limitation:  Current MI, has poor insight, lack of community resources.   Patient Support Contact (Include name, relationship, number, and summary of conversation):    No NIMO signed at this time.   Grandma is a major part of his life too.   Interventions:       Vulnerable Adult/Child Report - NA     Community-Based Programs - Patient needs more resources     Medical/Dental Care - Needs to reestablish Had services @ [facility] - Might benefit     CD Evaluation/Rule 25/Aftercare - patient denies CD or substance use.     Medication Management - OP psychiatry provider, Zoya    Individual Therapy - Currently does not have.  referred patient to therapy at [facility].     Clergy Request    Housing/Placement - Recently got a new apartment.     Case Management -Julito Richey - [facility]  [phone]     Insurance Coverage - MEDICARE/MEDICARE / [place]    Financial Assistance -Would benefit     Commit/[place] Screening ????    Suicide Risk Assessment - The patient denies SI for admit, patient has a hx of SI but attempt, Patient denies SI today.     High Risk Safety Plan Talk to supports; Call crisis lines; Go to local ER if feeling suicidal.  MAXINE Jama  5/1/2023  8:42 AM

## 2023-05-01 NOTE — PLAN OF CARE
"Face to face shift report received from Josephine LUIS RN. Rounding completed, pt observed.    Problem: Adult Behavioral Health Plan of Care  Goal: Patient-Specific Goal (Individualization)  Description: Pt. Will follow recommendations of treatment team during hospital stay.   Pt. Will sleep 6-8 hours a night during hospital stay.  Pt. Will maintain ADLs without prompting during hospital stay.  Pt. Will be free from self harm during hospital stay.   Pt. Will attend > 50% of unit programing during hospital stay.     Outcome: Progressing  Note: Shift Summary:  Patient attending group at the start of this shift. Patient is calm and cooperative with nurse.  Patient asking nurse if he \"could be put up for adoption\" as he states he has no family or nobody that cares for him.  Encouraged patient to make friends or join some groups/Voodoo to find a place to belong.  Patient laughs and walks away.  Denies pain or unwanted side effects. Patient can easily make his needs known to staff.  Gait is balanced and steady.  Patient asking to leave after HS snack.  Informed that he would not be going anywhere tonight.  Patient observed at the south end of hallway staring at the wall.  When nurse asked him what time he would like his HS scheduled Seroquel, patient shrugged his shoulders.  Finally shook his head affirming he would take it at 2100.  Patient then gesturing vs talking until nurse asked him if he had quit talking to me then he laughed and began using words again.  Nurse asked him where he would go if discharged and he stated \"I don't know. I guess I will go to bed\".  Pateint to his room.     Problem: Psychotic Signs/Symptoms  Goal: Improved Behavioral Control (Psychotic Signs/Symptoms)  Description: Patient will identify 2 healthy coping skills for when anxious.  Outcome: Progressing  Goal: Improved Mood Symptoms  Description: Patient will regain and maintain baseline thought process/mood by discharge.  Outcome: Progressing   "   Problem: Suicidal Behavior  Goal: Suicidal Behavior is Absent or Managed  Outcome: Progressing  Face to face report will be communicated to oncoming RN.    Karo Ragsdale RN  5/1/2023

## 2023-05-01 NOTE — PROGRESS NOTES
1:1 time with the patient.  No changes made to the discharge plan at this time.   See the AVS for follow up appointments and recommendations.     SW spoke to the patient. The patient requested SW to submit IRTS referrals.

## 2023-05-02 LAB
EST. AVERAGE GLUCOSE BLD GHB EST-MCNC: 117 MG/DL
HBA1C MFR BLD: 5.7 %

## 2023-05-02 PROCEDURE — 83036 HEMOGLOBIN GLYCOSYLATED A1C: CPT | Performed by: NURSE PRACTITIONER

## 2023-05-02 PROCEDURE — 82306 VITAMIN D 25 HYDROXY: CPT | Performed by: PSYCHIATRY & NEUROLOGY

## 2023-05-02 PROCEDURE — 250N000013 HC RX MED GY IP 250 OP 250 PS 637: Performed by: NURSE PRACTITIONER

## 2023-05-02 PROCEDURE — 36415 COLL VENOUS BLD VENIPUNCTURE: CPT | Performed by: NURSE PRACTITIONER

## 2023-05-02 PROCEDURE — 124N000001 HC R&B MH

## 2023-05-02 PROCEDURE — 250N000013 HC RX MED GY IP 250 OP 250 PS 637: Performed by: PSYCHIATRY & NEUROLOGY

## 2023-05-02 PROCEDURE — 99232 SBSQ HOSP IP/OBS MODERATE 35: CPT | Mod: GT | Performed by: PSYCHIATRY & NEUROLOGY

## 2023-05-02 PROCEDURE — 99222 1ST HOSP IP/OBS MODERATE 55: CPT | Performed by: NURSE PRACTITIONER

## 2023-05-02 RX ORDER — SALIVA STIMULANT COMB. NO.3
2 SPRAY, NON-AEROSOL (ML) MUCOUS MEMBRANE 4 TIMES DAILY PRN
Status: DISCONTINUED | OUTPATIENT
Start: 2023-05-02 | End: 2023-05-16 | Stop reason: HOSPADM

## 2023-05-02 RX ORDER — ALBUTEROL SULFATE 90 UG/1
2 AEROSOL, METERED RESPIRATORY (INHALATION) EVERY 6 HOURS PRN
Status: DISCONTINUED | OUTPATIENT
Start: 2023-05-02 | End: 2023-05-16 | Stop reason: HOSPADM

## 2023-05-02 RX ORDER — QUETIAPINE FUMARATE 200 MG/1
200 TABLET, FILM COATED ORAL AT BEDTIME
Status: DISCONTINUED | OUTPATIENT
Start: 2023-05-02 | End: 2023-05-09

## 2023-05-02 RX ADMIN — HYDROXYZINE HYDROCHLORIDE 25 MG: 25 TABLET, FILM COATED ORAL at 11:57

## 2023-05-02 RX ADMIN — HYDROXYZINE HYDROCHLORIDE 25 MG: 25 TABLET, FILM COATED ORAL at 17:44

## 2023-05-02 RX ADMIN — QUETIAPINE FUMARATE 25 MG: 25 TABLET ORAL at 17:42

## 2023-05-02 RX ADMIN — QUETIAPINE FUMARATE 25 MG: 25 TABLET ORAL at 05:45

## 2023-05-02 RX ADMIN — QUETIAPINE FUMARATE 200 MG: 200 TABLET ORAL at 20:39

## 2023-05-02 RX ADMIN — MELATONIN 5 MG TABLET 5 MG: at 20:39

## 2023-05-02 ASSESSMENT — ACTIVITIES OF DAILY LIVING (ADL)
ADLS_ACUITY_SCORE: 29
ADLS_ACUITY_SCORE: 29
ORAL_HYGIENE: INDEPENDENT
ADLS_ACUITY_SCORE: 29
HYGIENE/GROOMING: INDEPENDENT
ADLS_ACUITY_SCORE: 29
ADLS_ACUITY_SCORE: 29
HYGIENE/GROOMING: INDEPENDENT
ORAL_HYGIENE: INDEPENDENT
DRESS: INDEPENDENT
ADLS_ACUITY_SCORE: 29
DRESS: SCRUBS (BEHAVIORAL HEALTH);INDEPENDENT

## 2023-05-02 NOTE — PLAN OF CARE
Problem: Adult Behavioral Health Plan of Care  Goal: Patient-Specific Goal (Individualization)  Description: Pt. Will follow recommendations of treatment team during hospital stay.   Pt. Will sleep 6-8 hours a night during hospital stay.  Pt. Will maintain ADLs without prompting during hospital stay.  Pt. Will be free from self harm during hospital stay.   Pt. Will attend > 50% of unit programing during hospital stay.     Outcome: Progressing     Problem: Suicidal Behavior  Goal: Suicidal Behavior is Absent or Managed  Outcome: Progressing     Face to face shift report received from Karo ABBOTT. Rounding completed, pt observed.     Pt appeared to be sleeping at the beginning of this shift. Pt awake after 0315 rounds and out into the lobby. Pt given Seroquel 25 mg at 0545 per request for increased anxiety.    Face to face report will be communicated to oncoming RN.    Adriana Zapata RN  5/2/2023  5:59 AM

## 2023-05-02 NOTE — PLAN OF CARE
Face to face shift report received from Josephine LUIS RN. Rounding completed, pt observed.    Problem: Adult Behavioral Health Plan of Care  Goal: Patient-Specific Goal (Individualization)  Description: Pt. Will follow recommendations of treatment team during hospital stay.   Pt. Will sleep 6-8 hours a night during hospital stay.  Pt. Will maintain ADLs without prompting during hospital stay.  Pt. Will be free from self harm during hospital stay.   Pt. Will attend > 50% of unit programing during hospital stay.     5/2/2023 1801 by Karo Ragsdale RN  Outcome: Progressing  Note: Shift Summary: Patient was resting in bed at the start of this shift.  Patient up for evening meal and encouraged to stay up so he can sleep better tonight. Patient did complain of increased anxiety; Offered and accepted Seroquel 25 mg po at 1742 then immediately requested Hydroxyzine 25 mg po at 1744 as felt that the Seroquel may not be enough. Denies pain or unwanted side effects.  Gait is balanced and steady. Compliant with HS scheduled medications.  Melatonin 5 mg po for sleep aid.    Face to face report will be communicated to oncoming RN.    Karo Ragsdale RN  5/2/2023

## 2023-05-02 NOTE — PROGRESS NOTES
KELLY submitted IRTS referrals to [facility].     KELLY called and left a voicemail for the patient's  Julito.     KELLY submitted IRTS referral to [facility]    KELLY submitted IRTS referral to [facility].    KELLY submitted referral to [facility]

## 2023-05-02 NOTE — DISCHARGE INSTRUCTIONS
"Behavioral Discharge Planning and Instructions    Summary:  The patient is a 33 year old male that was admitted for psychosis. Patient reports to having a stomach bug that makes him think about his past. Patient stated in ER \" It make me fight the humanity within me.\"     Main Diagnosis: Psychosis    Health Care Follow-up:     [facility]  Appointment: 6/20/2023 @ 2:30 pm for Medication Management with Karen Michelle CNS and referral for PHP / Therapy.   Appointment: 6/19/2023 @ 1:45 pm for Post Hospital Follow Up.   Clinical Nurse Specialist  [address] [place] [address]  Phone: [phone]  Fax: [phone]    [facility] home healthcare Referral submitted for PT in Home services 5/15/23  [address], [place] [address]   Ph: [phone]  Fax: [phone]        Case Management -Julito Theodore    [phone]   Fax: [phone]    [place] [employer]  [address] , [place], [place] [id number] [phone]    [place] [address]  [address] [place], [place] [id number] [phone]      Podiatry or Orthopaedic Providers    [facility]  Alejandra Cruz DPM ACCEPTING NEW PATIENTS  [address]  [address]  [place], [place] [id number] [phone]     [facility]  [address]  [place] [address]  Office: [phone]  Fax [phone]  [email or web address]    [facility]  [address]  [address]  [place], [place] [id number] [phone]     [facility]  [address] [place], [place] [address] [phone]    [place] [facility]  [address] [place] [address]  [phone]  [email or web address]    Attend all scheduled appointments with your outpatient providers. Call at least 24 hours in advance if you need to reschedule an appointment to ensure continued access to " "your outpatient providers.     Major Treatments, Procedures and Findings:  You were provided with: a psychiatric assessment, assessed for medical stability, medication evaluation and/or management, group therapy, family therapy, individual therapy, CD evaluation/assessment, milieu management, and medical interventions    Symptoms to Report: feeling more aggressive, increased confusion, losing more sleep, mood getting worse, or thoughts of suicide    Early warning signs can include: increased depression or anxiety sleep disturbances increased thoughts or behaviors of suicide or self-harm  increased unusual thinking, such as paranoia or hearing voices        Resources:   Crisis Intervention: 626.444.1724 or 736-866-6605 (TTY: 892.747.7093).  Call anytime for help.  National [place] on Mental Illness (www.[place].lc.org): 173.209.2996 or 733-535-0093.  [place] Association for Children's Mental Health (www.macmh.org): 253.322.8350.  Alcoholics Anonymous (www.alcoholics-anonymous.org): Check your phone book for your local chapter.  Suicide Awareness Voices of Education (SAVE) (www.save.org): 965-599-JGSG (5911)  National Suicide Prevention Line (www.mentalhealthmn.org): 995-844-RPPD (7035)  Mental Health Consumer/Survivor Network of [place] (www.mhcsn.net): 359.534.3782 or 230-084-3677  Mental Health Association of [place] (www.mentalhealth.org): 326.266.1835 or 651-553-9643  Self- Management and Recovery Training., SMART-- Toll free: 265.680.6835  www.Spacebar.org  [facility] Crisis Response [phone]  [facility] Crisis Response [phone]  [facility] Crisis Response 904-934-1955  [facility] Crisis (COPE) Response - Adult [phone]  [facility] Crisis Response - Adult [phone]  [facility] Rapid Response [phone]  Text 4 Life: txt \"LIFE\" to 74144 for immediate support and crisis intervention  Crisis text line: Text \"[place]\" to 048244. Free, confidential, 24/7.  Crisis Intervention: " 160.794.7060 or 327-860-0340. Call anytime for help.   [facility] Mental Health Crisis Team - Child: [phone]  [facility] Children's Mental Health Crisis Response Team - Child: [phone]  [facility] Mental Health Crisis: 5-947-751-8234   [facility] Crisis Team:  [phone]  [place], [place], [place], and [facility] Mobile Crisis Response Team (CRT):  [phone] or [phone]     General Medication Instructions:   See your medication sheet(s) for instructions.   Take all medicines as directed.  Make no changes unless your doctor suggests them.   Go to all your doctor visits.  Be sure to have all your required lab tests. This way, your medicines can be refilled on time.  Do not use any drugs not prescribed by your doctor.  Avoid alcohol.    Advance Directives:   Scanned document on file with Socialtext? No scanned doc  Is document scanned? Pt states no documents  Honoring Choices Your Rights Handout: Informed and given  Was more information offered? Pt declined    The Treatment team has appreciated the opportunity to work with you. If you have any questions or concerns about your recent admission, you can contact the unit which can receive your call 24 hours a day, 7 days a week. They will be able to get in touch with a Provider if needed. The unit number is [phone] .    [place] Area:  [facility], [facility] stabilization [facility]- [phone]  [facility] Crisis Line: 1-522.870.3974  Advocates For Family Peace: 082-2121  Sexual Assault Program [facility]: 958.572.5554 or 1-369.557.1178  [place] Battered Women's Program: 1-458.477.5116 Ext: [phone]       Calls answered Mon-Fri-8:00 am--4:30 pm    [place]:  Advocates for Family Peace: 1-490.578.4013  [facility] - 4-528-966-3420  [facility] first call for help: 7-677-454-3960  [facility] Crisis Center:  ([phone]    [place] Area:  Warm Line:  "6-559-442-0972       Calls answered Tuesday--Saturday 4:00 pm--10:00 pm  [place] Crisis Line - [phone]  [place] Crisis Stabilization 633-109-5004    [place] Statewide:  [place] Crisis and Referral Services: 1-318.946.3845  National Suicide Prevention Lifeline: 7-918-356-TALK (3720)   - xga4rrpw- Text \"Life\" to 48702  First Call for Help: 2-1-1  [facility] Helpline- 7-696-DRXC-HELP   Crisis Text Line: Text  [place]  to 582071   "

## 2023-05-02 NOTE — PLAN OF CARE
Problem: Adult Behavioral Health Plan of Care  Goal: Patient-Specific Goal (Individualization)  Description: Pt. Will follow recommendations of treatment team during hospital stay.   Pt. Will sleep 6-8 hours a night during hospital stay.  Pt. Will maintain ADLs without prompting during hospital stay.  Pt. Will be free from self harm during hospital stay.   Pt. Will attend > 50% of unit programing during hospital stay.     Outcome: Progressing  Note:     1130 Patient up on the unit and has been encouraged to spend time in the day room and participate in groups. Patient has been compliant with doing so. Very pleasant with writer and states he was concerned about his skin as it is dry and flakey. Patient given lotion to apply to skin. Patient denies depression and suicidal thoughts at this time. Ate well for breakfast meal and patient denies pain or other physical problems. Patient denies having any auditory hallucinations at this time.    1257 Patient requested and given Hydroxyzine 25 mg po for complaints of anxiety. Patient has been up on the unit all morning.    1400 Patient allowed blood draw and laid down for a nap. Patient ate well.    Face to face end of shift report communicated to 3-11 shift RN.     Josephine Rey RN  5/2/2023  2:26 PM          Problem: Psychotic Signs/Symptoms  Goal: Improved Behavioral Control (Psychotic Signs/Symptoms)  Description: Patient will identify 2 healthy coping skills for when anxious.  Outcome: Progressing     Problem: Suicidal Behavior  Goal: Suicidal Behavior is Absent or Managed  Outcome: Progressing      Goal Outcome Evaluation:    Plan of Care Reviewed With: patient

## 2023-05-02 NOTE — PROGRESS NOTES
Laquita with [facility] called and added the patient to their waiting list. They will not have an opening until the end of May. Laquita shared that if the patient discharges home they can have their Case Manger coordinate intake with them.        Magui from [facility] is reviewing the patient's referral and will reach out to  5/03/2023.

## 2023-05-02 NOTE — H&P
"[facility]    History and Physical  Medical Services       Date of Admission:  4/30/2023  Date of Service (when I saw the patient): 05/02/23    Assessment & Plan     Principal Problem:    Psychosis (H)    Active Medical Problems:  Morbid obesity- 170.1 kg. BMI 50.87. Reports he was started on ozempic and only took it 2 weeks before stopping due to side effects. Last A1c in August 5.6.  Glucose in the ED wnl  at 89. A1c today is 5.7 which does put him in the prediabetic range.     Prediabetes- a1c 5.7. Discussed with him about starting metformin. We discussed side effects of the medication as well as the risks and benefits of starting it. Discussed weight loss, exercise and diet. Foods to avoid or limit such as breads, sugars, pasta.  He is not interested in starting it  and states \"I will pass on the metformin, I will try and lose weight.\"    Exercise induced asthma- reports he last used his albuterol inhaler 6 months ago. Denies chest pain, sob. Albuterol inhaler as needed.     Xerostomia- reports he is drinking plenty of water but feels his mouth is dry. He reports he always has issues with dry mouth. Biotene ordered.       Pt medically stable, no acute medical concerns. Chronic medical problems stable. Will sign off. Please consult for any new medical issues or concerns.       Code Status: Full Code    Sherrie Parra CNP    Primary Care Physician   Physician No Ref-Primary    Chief Complaint   Psych evaluation     History is obtained from the patient and medical chart     History of Present Illness   (per ED) Connor Soares is a 33 year old male with a past history notable for major depressive disorder versus schizoaffective disorder, depressive type, anxiety, and autism spectrum disorder. Patient presented to the emergency department after a bystander contacted 911 due to concern that patient was behaving strangely. The bystander told EMS that patient had stated that he was looking for a dog that " he lost 7 years ago and was not answering questions appropriately. Patient was medically evaluated in the emergency department and found to be medically stable for transfer to [facility] for further psychiatric assessment.     Past Medical History    I have reviewed this patient's medical history and updated it with pertinent information if needed.   No past medical history on file.    Past Surgical History   I have reviewed this patient's surgical history and updated it with pertinent information if needed.  No past surgical history on file.    Prior to Admission Medications   Prior to Admission Medications   Prescriptions Last Dose Informant Patient Reported? Taking?   OZEMPIC, 2 MG/DOSE, 8 MG/3ML pen Not Taking  Yes No   Sig: Inject 0.75 mLs Subcutaneous once a week (2 mg)   Patient not taking: Reported on 5/1/2023   QUEtiapine (SEROQUEL) 25 MG tablet 4/30/2023 at 0741 ER  Yes Yes   Sig: Take 12.5 mg by mouth every morning   QUEtiapine (SEROQUEL) 25 MG tablet 4/28/2023 at ER dose  Yes Yes   Sig: Take 25 mg by mouth 3 times daily as needed (anxiety, sleep, agitation)   QUEtiapine (SEROQUEL) 50 MG tablet 4/29/2023 at 2154 ER dose  Yes Yes   Sig: Take 50 mg by mouth At Bedtime   acetaminophen (TYLENOL) 325 MG tablet Past Week  Yes Yes   Sig: Take 650 mg by mouth daily as needed (arm pain)   albuterol (PROAIR HFA/PROVENTIL HFA/VENTOLIN HFA) 108 (90 Base) MCG/ACT inhaler More than a month  Yes Yes   Sig: Inhale 2 puffs into the lungs 4 times daily as needed      Facility-Administered Medications: None     Allergies   No Known Allergies    Social History   I have reviewed this patient's social history and updated it with pertinent information if needed. Connor Soares      Family History   I have reviewed this patient's family history and updated it with pertinent information if needed.   No family history on file.    Review of Systems   CONSTITUTIONAL:  negative  EYES:  negative  HEENT:  Negative except dry  mouth  RESPIRATORY:  negative  CARDIOVASCULAR:  negative  GASTROINTESTINAL:  negative  GENITOURINARY:  negative  INTEGUMENT/BREAST:  negative  HEMATOLOGIC/LYMPHATIC:  negative  ALLERGIC/IMMUNOLOGIC:  negative  ENDOCRINE:  negative  MUSCULOSKELETAL:  negative  NEUROLOGICAL:  negative    Physical Exam   Temp: 98.2  F (36.8  C) Temp src: Tympanic BP: 155/90 Pulse: 112 (retook)   Resp: 16 SpO2: 96 % O2 Device: None (Room air)    Vital Signs with Ranges  Temp:  [98.2  F (36.8  C)-99.1  F (37.3  C)] 98.2  F (36.8  C)  Pulse:  [112-125] 112  Resp:  [16-18] 16  BP: (114-155)/(87-90) 155/90  SpO2:  [94 %-96 %] 96 %  375 lbs 1.6 oz    Constitutional: awake, alert, cooperative, no apparent distress, and appears stated age, vitals stable   Eyes: Lids and lashes normal, pupils equal, round and reactive to light, extra ocular muscles intact, sclera clear, conjunctiva normal  ENT: Normocephalic, without obvious abnormality, atraumatic, external ears without lesions, oral pharynx with moist mucous membranes, no erythema or exudates  Hematologic / Lymphatic: no cervical lymphadenopathy  Respiratory: No increased work of breathing, good air exchange, clear to auscultation bilaterally, no crackles or wheezing  Cardiovascular: Normal apical impulse, regular rate and rhythm, normal S1 and S2, no S3 or S4, and no murmur noted  GI: obesity, normal bowel sounds, soft, non-distended, non-tender, no masses palpated, no hepatosplenomegally  Genitounirinary: deferred  Skin: normal skin color, texture, turgor and no redness, warmth, or swelling  Musculoskeletal: There is no redness, warmth, or swelling of the joints.  Full range of motion noted.   Neurologic: Awake, alert, oriented to name, place and time.  Neuropsychiatric: General: normal, calm and normal eye contact    Data   Data reviewed today:   Recent Labs   Lab 04/29/23  1627   WBC 10.1   HGB 14.6   MCV 89         POTASSIUM 4.2   CHLORIDE 101   CO2 25   BUN 8.4   CR 0.97    ANIONGAP 13   ABDULLAHI 9.7   GLC 89   ALBUMIN 4.2   PROTTOTAL 7.2   BILITOTAL 0.4   ALKPHOS 83   ALT 46   AST 23       No results found for this or any previous visit (from the past 24 hour(s)).

## 2023-05-03 PROCEDURE — 250N000013 HC RX MED GY IP 250 OP 250 PS 637: Performed by: PSYCHIATRY & NEUROLOGY

## 2023-05-03 PROCEDURE — 250N000013 HC RX MED GY IP 250 OP 250 PS 637: Performed by: NURSE PRACTITIONER

## 2023-05-03 PROCEDURE — 124N000001 HC R&B MH

## 2023-05-03 PROCEDURE — 99233 SBSQ HOSP IP/OBS HIGH 50: CPT | Performed by: NURSE PRACTITIONER

## 2023-05-03 RX ADMIN — ACETAMINOPHEN 325MG 650 MG: 325 TABLET ORAL at 06:17

## 2023-05-03 RX ADMIN — QUETIAPINE FUMARATE 200 MG: 200 TABLET ORAL at 20:32

## 2023-05-03 RX ADMIN — OLANZAPINE 10 MG: 10 TABLET, FILM COATED ORAL at 08:50

## 2023-05-03 RX ADMIN — MELATONIN 5 MG TABLET 5 MG: at 20:32

## 2023-05-03 ASSESSMENT — ACTIVITIES OF DAILY LIVING (ADL)
ADLS_ACUITY_SCORE: 30
ADLS_ACUITY_SCORE: 30
HYGIENE/GROOMING: INDEPENDENT
ADLS_ACUITY_SCORE: 29
ADLS_ACUITY_SCORE: 30
ADLS_ACUITY_SCORE: 30
ADLS_ACUITY_SCORE: 29
ADLS_ACUITY_SCORE: 30
ADLS_ACUITY_SCORE: 29
ORAL_HYGIENE: INDEPENDENT
ADLS_ACUITY_SCORE: 29
ADLS_ACUITY_SCORE: 30
DRESS: INDEPENDENT

## 2023-05-03 NOTE — PROGRESS NOTES
"  [facility] PSYCHIATRY  -  PROGRESS NOTE     ID   Name: Connor Soares  MRN#: [id number]     SUBJECTIVE   Prior to interviewing the patient, I met with nursing and reviewed patient's clinical condition. We discussed clinical care both before and after the interview. I have reviewed the patient's clinical course by review of records including previous notes, labs, and vital signs.     Per nursing, the patient had the following behavioral events over the last 24-hours: none.    On psychiatric interview, he is met within his room. He initially presents rather bright and states he is doing well and able to think more clearly. He has been attending some groups, acknowledges that it feels good to be around people and tells me he needs to do a better job taking care of himself, cooking healthy meals and getting more exercise. He even told me that he got on the elliptical for a couple minutes in group. He is however adamant that he does not want to return to his apartment on discharge, but does not provide a reason why other than it is dark with very few windows and doesn't feel like home. I reassured him that he has a  that can help him fix these things. Significant change in affect noted after this. Patient states \"can't I sign myself up for adoption?\" Denies suicidal ideation and hallucinations. He is sleeping and eating. Denies physical concerns. SW has been unable to get in contact with patient's . Call placed myself as I would like to know patient's baseline.     Patient's  returned my call late in the afternoon. She states she has known the patient for 2 years, never to be talking about bugs like he was on admission and not happy with his apartment, although she admits that it is a new apartment as of a month ago and does not have furniture, but she has been working on getting him some. She was unaware that he was no longer on Abilify and notes he has been on it for the " "duration of their relationship.         MEDICATIONS   Scheduled Meds:    QUEtiapine  200 mg Oral At Bedtime     PRN Meds:.acetaminophen, albuterol, alum & mag hydroxide-simethicone, artificial saliva, hydrOXYzine, melatonin, OLANZapine **OR** OLANZapine, QUEtiapine     ALLERGIES   No Known Allergies     VITALS   Vitals: /81 (BP Location: Left arm)   Pulse 105   Temp 97.7  F (36.5  C) (Tympanic)   Resp 16   Ht 1.829 m (6')   Wt (!) 170.1 kg (375 lb 1.6 oz)   SpO2 97%   BMI 50.87 kg/m       MENTAL STATUS EXAM   Appearance:  awake, alert, adequately groomed, dressed in hospital scrubs, and appeared as age stated  Attitude:  cooperative  Eye Contact:  good  Mood:  \"down\"  Affect:  mood congruent  Speech:  clear, coherent  Psychomotor Behavior:  no evidence of tardive dyskinesia, dystonia, or tics  Thought Process:  logical, linear, and goal oriented  Associations:  no loose associations  Thought Content:  no evidence of suicidal ideation or homicidal ideation  Insight:  limited  Judgment:  limited  Oriented to:  time, person, and place  Attention Span and Concentration:  intact  Recent and Remote Memory:  intact  Gait and Station: Normal       LABS   Recent Results (from the past 24 hour(s))   Hemoglobin A1c    Collection Time: 05/02/23  1:20 PM   Result Value Ref Range    Estimated Average Glucose 117 mg/dL    Hemoglobin A1C 5.7 (H) <5.7 %         ASSESSMENT   Connorgracie Soares is a 33 year old male with a past psychiatric history notable for MDD with psychotic features versus schizoaffective disorder and autism spectrum disorder. Multiple prior inpatient psychiatric hospitalizations. Medication nonadherence.  Presented to outside emergency department accompanied by law enforcement after being found disorganized in the community attempting to search for his dog that he lost over 7 years ago. He was subsequently sent to [facility] unit and started on Seroquel 50 mg at bedtime. Patient was subsequently " transferred and accepted for inpatient psychiatric hospitalization at [facility] for further safety and further stabilization     Daily Progress: Patient adamant about not wanting to return to his apartment, even if he is awaiting IRTS placement - Unclear about reason why. Collateral from  indicates significant change in functioning since patient stopped Abilify. Will talk to patient about restarting tomorrow.       DIAGNOSTIC FORMULATION   #major depressive disorder with psychotic features  #autism spectrum disorder  #medication nonadherence     PLAN     Location: Unit 5  Legal Status: Orders Placed This Encounter      Legal status 72 Hour Hold    Safety Assessment:    Behavioral Orders   Procedures     Code 1 - Restrict to Unit     Routine Programming     As clinically indicated     Status 15     Every 15 minutes.          PTA medications continued/changed:   -Increase Seroquel to 200 mg at bedtime    New medications tried and stopped:   -None    New medications initiated:   -None    Today's Changes:  -no changes made    Based on collateral received from patient's case manger regarding previous success with Abilify and significant change since stopping, will encourage patient to restart tomorrow with diagnosis likely being schizoaffective disorder.     Programming: Patient will be treated in a therapeutic milieu with appropriate individual and group therapies. Education will be provided on diagnoses, medications, and treatments. Encouraged behavioral activation and participation in group programming.     Medical diagnoses:  Per medicine    Disposition: pending clinical course, home with services versus IRTS       ATTESTATION    Nicole Fuentes NP

## 2023-05-03 NOTE — PLAN OF CARE
Face to face shift report received from Zheng CABELLO RN. Rounding completed, pt observed.    Problem: Adult Behavioral Health Plan of Care  Goal: Patient-Specific Goal (Individualization)  Description: Pt. Will follow recommendations of treatment team during hospital stay.   Pt. Will sleep 6-8 hours a night during hospital stay.  Pt. Will maintain ADLs without prompting during hospital stay.  Pt. Will be free from self harm during hospital stay.   Pt. Will attend > 50% of unit programing during hospital stay.     5/3/2023 1550 by Karo Ragsdale RN  Outcome: Progressing  Note: Shift Summary:  Patient in group at the start of this shift.  Patient tells nurse that he slept way better last night but felt tired when he got up.  Admits to intrusive thoughts but will not elaborate on what they entail.  Eye contact is hesitant.  Appetite is good.  Denies pain or unwanted side effects.  0015:  Care continued for next shift.  Patient in bed with eyes closed.  Audible breaths as is snoring loudly.  0605: Independent position changes noted throughout the night. Appeared to sleep all night.       Problem: Psychotic Signs/Symptoms  Goal: Improved Behavioral Control (Psychotic Signs/Symptoms)  Description: Patient will identify 2 healthy coping skills for when anxious.  Outcome: Progressing  Goal: Improved Mood Symptoms  Description: Patient will regain and maintain baseline thought process/mood by discharge.  Outcome: Progressing  Face to face report will be communicated to oncoming RN.    Karo Ragsdale RN  5/3/2023

## 2023-05-03 NOTE — PROGRESS NOTES
"  [facility] PSYCHIATRY  -  PROGRESS NOTE     ID   Name: Connor Soares  MRN#: [id number]     SUBJECTIVE   Prior to interviewing the patient, I met with nursing and reviewed patient's clinical condition. We discussed clinical care both before and after the interview. I have reviewed the patient's clinical course by review of records including previous notes, labs, and vital signs.     Per nursing, the patient had the following behavioral events over the last 24-hours: none, slept most of the afternoon yesterday    On psychiatric interview, he is met within his room. He states he slept poorly last night however acknowledges that he slept most of the day yesterday. Encouraged him to not nap in the daytime. Agrees to increase seroquel to 200 mg q daily. When asking if he notices anything different, he does acknowledge that it is \"Easier\" to think.  He denies any side effects from seroquel. Denies pain. No active SI today. Willing to go to groups.        MEDICATIONS   Scheduled Meds:    QUEtiapine  200 mg Oral At Bedtime     PRN Meds:.acetaminophen, albuterol, alum & mag hydroxide-simethicone, artificial saliva, hydrOXYzine, melatonin, OLANZapine **OR** OLANZapine, QUEtiapine     ALLERGIES   No Known Allergies     VITALS   Vitals: /85   Pulse 104   Temp 97.8  F (36.6  C) (Tympanic)   Resp 16   Ht 1.829 m (6')   Wt (!) 170.1 kg (375 lb 1.6 oz)   SpO2 98%   BMI 50.87 kg/m       MENTAL STATUS EXAM   Appearance:  awake, alert, adequately groomed, dressed in hospital scrubs, and appeared as age stated  Attitude:  cooperative  Eye Contact:  good  Mood:  \"down\"  Affect:  mood congruent  Speech:  clear, coherent  Psychomotor Behavior:  no evidence of tardive dyskinesia, dystonia, or tics  Thought Process:  logical, linear, and goal oriented  Associations:  no loose associations  Thought Content:  no evidence of suicidal ideation or homicidal ideation  Insight:  limited  Judgment:  limited  Oriented to:  " time, person, and place  Attention Span and Concentration:  intact  Recent and Remote Memory:  intact  Gait and Station: Normal       LABS   Recent Results (from the past 24 hour(s))   Hemoglobin A1c    Collection Time: 05/02/23  1:20 PM   Result Value Ref Range    Estimated Average Glucose 117 mg/dL    Hemoglobin A1C 5.7 (H) <5.7 %         ASSESSMENT   Connor Soares is a 33 year old male with a past psychiatric history notable for MDD with psychotic features versus schizoaffective disorder and autism spectrum disorder. Multiple prior inpatient psychiatric hospitalizations. Medication nonadherence.  Presented to outside emergency department accompanied by law enforcement after being found disorganized in the community attempting to search for his dog that he lost over 7 years ago. He was subsequently sent to [facility] unit and started on Seroquel 50 mg at bedtime. Patient was subsequently transferred and accepted for inpatient psychiatric hospitalization at [facility] for further safety and further stabilization     Daily Progress: increase seroquel to 200 mg at bedtime       DIAGNOSTIC FORMULATION   #major depressive disorder with psychotic features  #autism spectrum disorder  #medication nonadherence     PLAN     Location: Unit 5  Legal Status: Orders Placed This Encounter      Legal status 72 Hour Hold    Safety Assessment:    Behavioral Orders   Procedures     Code 1 - Restrict to Unit     Routine Programming     As clinically indicated     Status 15     Every 15 minutes.          PTA medications continued/changed:   -    New medications tried and stopped:   -None    New medications initiated:   -    Today's Changes:  - increase Seroquel to 200 mg at bedtime   - encourage to not nap during day     Programming: Patient will be treated in a therapeutic milieu with appropriate individual and group therapies. Education will be provided on diagnoses, medications, and  treatments. Encouraged behavioral activation and participation in group programming.     Medical diagnoses:  Per medicine    Disposition: pending clinical course , home with services versus IRTS       TREATMENT TEAM CARE PLAN     Progress: Continued symptoms.    Continued Stay Criteria/Rationale: Continued symptoms without sufficient improvement/resolution.    Medical/Physical: See above.    Precautions: See above.     Plan: Continue inpatient care with unit support and medication management.    Rationale for change in precautions or plan: NA due to no change.    Participants: Andrew Monteiro MD, Nursing, SW, OT.    The patient's care was discussed with the treatment team and chart notes were reviewed.       ATTESTATION    Andrew Monteiro MD  [facility]   Psychiatry    Video Visit: Patient has given verbal consent for video visit?: Yes  Type of Service: video visit for mental health treatment  Reason for Video Visit: COVID-19 [place]  Originating Site (patient location): [facility]  Distant Site (provider location): Remote Location  Mode of Communication: Video Conference via [employer]  Time of Service: Date: May 2, 2023 , Start:08:00 end: 08:30

## 2023-05-03 NOTE — PLAN OF CARE
Problem: Adult Behavioral Health Plan of Care  Goal: Patient-Specific Goal (Individualization)  Description: Pt. Will follow recommendations of treatment team during hospital stay.   Pt. Will sleep 6-8 hours a night during hospital stay.  Pt. Will maintain ADLs without prompting during hospital stay.  Pt. Will be free from self harm during hospital stay.   Pt. Will attend > 50% of unit programing during hospital stay.     Outcome: Progressing     Problem: Suicidal Behavior  Goal: Suicidal Behavior is Absent or Managed  Outcome: Progressing     Face to face shift report received from Karo ABBOTT. Rounding completed, pt observed.     Pt appeared to sleep most of this shift. Pt did not have any noted episodes of self harm this shift.    Face to face report will be communicated to oncoming RN.    Adriana Zapata RN  5/3/2023  5:54 AM

## 2023-05-03 NOTE — PLAN OF CARE
Problem: Suicidal Behavior  Goal: Suicidal Behavior is Absent or Managed  Outcome: Progressing    Pt denies SI currently     Problem: Psychotic Signs/Symptoms  Goal: Improved Behavioral Control (Psychotic Signs/Symptoms)  Description: Patient will identify 2 healthy coping skills for when anxious.  Outcome: Progressing    Pt identified walking and listening to music as coping skills     Problem: Adult Behavioral Health Plan of Care  Goal: Patient-Specific Goal (Individualization)  Description: Pt. Will follow recommendations of treatment team during hospital stay.   Pt. Will sleep 6-8 hours a night during hospital stay.  Pt. Will maintain ADLs without prompting during hospital stay.  Pt. Will be free from self harm during hospital stay.   Pt. Will attend > 50% of unit programing during hospital stay.     Outcome: Progressing  0730 Face to face rounding completed.  Pt introduced to staff for the the shift.    Pt was up some this shift for meals and sat in the dayroom listening to music on headphones.  He denied hearing voices but woke having very intrusive thoughts and near panic.  He was given 10 mg of Zyprexa and reported that it helped a great deal. He was encouraged to walk some in the hallway which he did. He told me that he does not leave his apartment much.  Pt told me that he would like to get outside and do some walking.  He discussed getting a [employer] membership as a way to get out more and be around other people.      1500 Face to face end of shift report communicated to Evening shift RN's along with Pt's fall risk.     Zheng Wilson RN  5/3/2023

## 2023-05-04 PROCEDURE — 250N000013 HC RX MED GY IP 250 OP 250 PS 637: Performed by: NURSE PRACTITIONER

## 2023-05-04 PROCEDURE — 250N000013 HC RX MED GY IP 250 OP 250 PS 637: Performed by: PSYCHIATRY & NEUROLOGY

## 2023-05-04 PROCEDURE — 124N000001 HC R&B MH

## 2023-05-04 PROCEDURE — 99232 SBSQ HOSP IP/OBS MODERATE 35: CPT | Performed by: NURSE PRACTITIONER

## 2023-05-04 RX ORDER — ARIPIPRAZOLE 5 MG/1
2.5 TABLET ORAL AT BEDTIME
Status: DISCONTINUED | OUTPATIENT
Start: 2023-05-04 | End: 2023-05-05

## 2023-05-04 RX ADMIN — ARIPIPRAZOLE 2.5 MG: 5 TABLET ORAL at 21:13

## 2023-05-04 RX ADMIN — ACETAMINOPHEN 325MG 650 MG: 325 TABLET ORAL at 23:25

## 2023-05-04 RX ADMIN — QUETIAPINE FUMARATE 200 MG: 200 TABLET ORAL at 21:13

## 2023-05-04 ASSESSMENT — ACTIVITIES OF DAILY LIVING (ADL)
ADLS_ACUITY_SCORE: 30
DRESS: INDEPENDENT
ADLS_ACUITY_SCORE: 30
ADLS_ACUITY_SCORE: 30
ORAL_HYGIENE: INDEPENDENT
DRESS: SCRUBS (BEHAVIORAL HEALTH);INDEPENDENT
ADLS_ACUITY_SCORE: 30
HYGIENE/GROOMING: INDEPENDENT
LAUNDRY: UNABLE TO COMPLETE
ADLS_ACUITY_SCORE: 30
HYGIENE/GROOMING: INDEPENDENT
ADLS_ACUITY_SCORE: 30
ORAL_HYGIENE: INDEPENDENT

## 2023-05-04 NOTE — PROGRESS NOTES
1:1 time with the patient.  No changes made to the discharge plan at this time.   See the AVS for follow up appointments and recommendations.     Sw received a note that the patient wished to speak with them.     SW spoke with the patient. The patient refused to speak with SW. The patient kept pointing at a pamphlet he was reading. SW asked the patient if her wanted SW to come back later, and the patient nodded his head yeas.

## 2023-05-04 NOTE — PLAN OF CARE
Problem: Suicidal Behavior  Goal: Suicidal Behavior is Absent or Managed  Outcome: Progressing    Pt declined SI though admits feeling depressed     Problem: Psychotic Signs/Symptoms  Goal: Improved Behavioral Control (Psychotic Signs/Symptoms)  Description: Patient will identify 2 healthy coping skills for when anxious.  Outcome: Progressing    Pt could not identify coping skills and did not seem reality based     Problem: Adult Behavioral Health Plan of Care  Goal: Patient-Specific Goal (Individualization)  Description: Pt. Will follow recommendations of treatment team during hospital stay.   Pt. Will sleep 6-8 hours a night during hospital stay.  Pt. Will maintain ADLs without prompting during hospital stay.  Pt. Will be free from self harm during hospital stay.   Pt. Will attend > 50% of unit programing during hospital stay.     Outcome: Progressing  Flowsheets (Taken 5/4/2023 1158)  Patient Vulnerabilities: limited social skills  Patient Personal Strengths: stable living environment   Goal Outcome Evaluation:    Plan of Care Reviewed With: patient      0726 Face to face rounding complete.  Pt introduced to nursing for the shift.    Pt did not get up for breakfast this morning and declined to attend groups.  I asked him what was going on and he said he is fine.  Pt did get up and walked the soto some with prompting from multiple staff. This afternoon I talked with him about how he did on Abilify. He told me that he gained weight on Abilify and feels that the Seroquel is working fine.  He then told me that he doesn't want to take any pills and feels that he can just start going to the [facility] and go for walks instead of take medications.  Pt came up to the nurses station later on asking if he can get the phone number for Donn because he wants him to be his legal guardian.  I asked him if he had his last name or someone that would know his number.  He told me that he didn't know.  He the asked if we can find him a  place outside of [place] to live.  I asked him if he had family of friends he knew out of state and he said no.  I asked him where he would go and he did not answer.  I asked him why he cannot go back to his apartment and he would not answer.  He did tell me that he would rather live with a bug in him than take medications.  Pt was very anxious and frustrated this afternoon after our conversation.    1500 Face to face end of shift report communicated to Evening Shift RN's along with Pt's fall risk.     Zheng Wilson RN  5/4/2023

## 2023-05-04 NOTE — PLAN OF CARE
"Face to face shift report received from Zheng CABELLO RN. Rounding completed, pt observed.    Problem: Adult Behavioral Health Plan of Care  Goal: Patient-Specific Goal (Individualization)  Description: Pt. Will follow recommendations of treatment team during hospital stay.   Pt. Will sleep 6-8 hours a night during hospital stay.  Pt. Will maintain ADLs without prompting during hospital stay.  Pt. Will be free from self harm during hospital stay.   Pt. Will attend > 50% of unit programing during hospital stay.     Outcome: Progressing  Note: Shift Summary:  Patient looking at some kind of paperwork and making notes at he start of this shift. Poor eye contact and affect flat.  Denies needs at this time.  Patient then asked to speak to nurse right before evening meal.  He shows nurse what he had been writing and he was copying the Patient Bill of Rights. States he did not know why he was doing that since he had been provided a printed copy.Patient admitted that he had been on Abilify in the past and had quit taking in February and felt that he had done okay up until \"whatever happened to get me here\". Nurse informed patient of the report we were given on admission.  Patient stated that he is willing to restart the Abilify.  On call NP contacted. See new orders.Patient tells nurse of how he had gone to treatment in the past to quit alcohol and marijuana, had lived in a skilled nursing house and had once had a job.  He admits to poor relationships with family.  Patient presented as less anxious the rest of this shift.  Compliant with scheduled HS medications.  Gait is balanced and steady.   2308:  Nurse in report and heard someone yell from unit.  Nurse out to lounge and observed patient lying on his back on floor near table.  Patient swearing quietly, apologizing for falling and complaining of pain in right foot.  Patient pointed to top of right foot as the area most painful.  Patient denied pain any place else except for right " foot.  VS obtained.  No visible injury or swelling at this time.  Patient able to get self up by pulling on locked wheelchair and able to stand, pivot quickly and sit in wheelchair.  Assisted back to his room. On call NP notified at 2314.  Patient then complaining that the arch of his foot and both sides of ankle were painful.  Denies any previous injuries to this foot or ankle. Ice pack applied and Tylenol 650 mg po administered at  2325.  NP gave no new orders and will re-evaluate in AM.  Patient sleeping soundly and snoring by 0000.  Will continue to monitor.  0600:  Patient appeared to sleep all night.  Respirations were audible and regular.  Tap bell at bedside.  0617:  Patient awake in bed after VS obtained.  Ice pack changed out with new one.  Patient states it hurts on top of R foot still and back of foot right above his heel.  Instructed to use tap bell for assistance.  Offered urinal at bedside until he can be further assessed by provider and patient declined.  Patient also declined offer of Tylenol.    Problem: Psychotic Signs/Symptoms  Goal: Improved Behavioral Control (Psychotic Signs/Symptoms)  Description: Patient will identify 2 healthy coping skills for when anxious.  Outcome: Progressing  Goal: Improved Mood Symptoms  Description: Patient will regain and maintain baseline thought process/mood by discharge.  Outcome: Progressing  Face to face report will be communicated to oncoming RN.    Karo Ragsdale RN  5/4/2023

## 2023-05-05 ENCOUNTER — APPOINTMENT (OUTPATIENT)
Dept: OCCUPATIONAL THERAPY | Facility: HOSPITAL | Age: 33
DRG: 885 | End: 2023-05-05
Attending: STUDENT IN AN ORGANIZED HEALTH CARE EDUCATION/TRAINING PROGRAM
Payer: MEDICARE

## 2023-05-05 ENCOUNTER — APPOINTMENT (OUTPATIENT)
Dept: GENERAL RADIOLOGY | Facility: HOSPITAL | Age: 33
DRG: 885 | End: 2023-05-05
Attending: NURSE PRACTITIONER
Payer: MEDICARE

## 2023-05-05 PROCEDURE — 124N000001 HC R&B MH

## 2023-05-05 PROCEDURE — 73630 X-RAY EXAM OF FOOT: CPT | Mod: RT

## 2023-05-05 PROCEDURE — 99233 SBSQ HOSP IP/OBS HIGH 50: CPT | Mod: GT | Performed by: PSYCHIATRY & NEUROLOGY

## 2023-05-05 PROCEDURE — 97165 OT EVAL LOW COMPLEX 30 MIN: CPT | Mod: GO

## 2023-05-05 PROCEDURE — 999N000104 HC STATISTIC NO CHARGE

## 2023-05-05 PROCEDURE — 99233 SBSQ HOSP IP/OBS HIGH 50: CPT | Performed by: NURSE PRACTITIONER

## 2023-05-05 PROCEDURE — 250N000013 HC RX MED GY IP 250 OP 250 PS 637: Performed by: NURSE PRACTITIONER

## 2023-05-05 PROCEDURE — 250N000013 HC RX MED GY IP 250 OP 250 PS 637: Performed by: PSYCHIATRY & NEUROLOGY

## 2023-05-05 RX ORDER — NALOXONE HYDROCHLORIDE 0.4 MG/ML
0.4 INJECTION, SOLUTION INTRAMUSCULAR; INTRAVENOUS; SUBCUTANEOUS
Status: DISCONTINUED | OUTPATIENT
Start: 2023-05-05 | End: 2023-05-16 | Stop reason: HOSPADM

## 2023-05-05 RX ORDER — CALCIUM CARBONATE/VITAMIN D3 600 MG-10
1 TABLET ORAL 2 TIMES DAILY WITH MEALS
Status: DISCONTINUED | OUTPATIENT
Start: 2023-05-05 | End: 2023-05-16 | Stop reason: HOSPADM

## 2023-05-05 RX ORDER — NALOXONE HYDROCHLORIDE 0.4 MG/ML
0.2 INJECTION, SOLUTION INTRAMUSCULAR; INTRAVENOUS; SUBCUTANEOUS
Status: DISCONTINUED | OUTPATIENT
Start: 2023-05-05 | End: 2023-05-16 | Stop reason: HOSPADM

## 2023-05-05 RX ORDER — IBUPROFEN 600 MG/1
600 TABLET, FILM COATED ORAL EVERY 6 HOURS PRN
Status: DISCONTINUED | OUTPATIENT
Start: 2023-05-05 | End: 2023-05-16 | Stop reason: HOSPADM

## 2023-05-05 RX ORDER — OXYCODONE HYDROCHLORIDE 5 MG/1
5 TABLET ORAL EVERY 6 HOURS PRN
Status: DISPENSED | OUTPATIENT
Start: 2023-05-05 | End: 2023-05-08

## 2023-05-05 RX ORDER — ONDANSETRON 4 MG/1
4 TABLET, ORALLY DISINTEGRATING ORAL EVERY 6 HOURS PRN
Status: DISCONTINUED | OUTPATIENT
Start: 2023-05-05 | End: 2023-05-16 | Stop reason: HOSPADM

## 2023-05-05 RX ADMIN — MELATONIN 5 MG TABLET 5 MG: at 23:26

## 2023-05-05 RX ADMIN — ACETAMINOPHEN 325MG 650 MG: 325 TABLET ORAL at 13:45

## 2023-05-05 RX ADMIN — IBUPROFEN 600 MG: 600 TABLET ORAL at 11:38

## 2023-05-05 RX ADMIN — IBUPROFEN 600 MG: 600 TABLET ORAL at 16:41

## 2023-05-05 RX ADMIN — ACETAMINOPHEN 325MG 650 MG: 325 TABLET ORAL at 23:23

## 2023-05-05 RX ADMIN — QUETIAPINE FUMARATE 200 MG: 200 TABLET ORAL at 20:27

## 2023-05-05 RX ADMIN — CALCIUM CARBONATE 600 MG (1,500 MG)-VITAMIN D3 400 UNIT TABLET 1 TABLET: at 16:41

## 2023-05-05 ASSESSMENT — ACTIVITIES OF DAILY LIVING (ADL)
ORAL_HYGIENE: INDEPENDENT
ADLS_ACUITY_SCORE: 30
ORAL_HYGIENE: INDEPENDENT
ADLS_ACUITY_SCORE: 35
ADLS_ACUITY_SCORE: 30
ADLS_ACUITY_SCORE: 30
HYGIENE/GROOMING: INDEPENDENT
DRESS: SCRUBS (BEHAVIORAL HEALTH);INDEPENDENT
ADLS_ACUITY_SCORE: 30
ADLS_ACUITY_SCORE: 35
ADLS_ACUITY_SCORE: 30
ADLS_ACUITY_SCORE: 35
HYGIENE/GROOMING: INDEPENDENT
ADLS_ACUITY_SCORE: 35
LAUNDRY: UNABLE TO COMPLETE
ADLS_ACUITY_SCORE: 35
ADLS_ACUITY_SCORE: 35
ADLS_ACUITY_SCORE: 30

## 2023-05-05 NOTE — PLAN OF CARE
Problem: Fall Injury Risk  Goal: Absence of Fall and Fall-Related Injury  Description: Refer int ind. Care Plan  Outcome: Progressing    Pt up only to transfer to wheel chair     Problem: Suicidal Behavior  Goal: Suicidal Behavior is Absent or Managed  Outcome: Progressing    Pt denies SI     Problem: Psychotic Signs/Symptoms  Goal: Improved Behavioral Control (Psychotic Signs/Symptoms)  Description: Patient will identify 2 healthy coping skills for when anxious.  Outcome: Not Progressing    Pt unable to identify coping skills     Problem: Adult Behavioral Health Plan of Care  Goal: Patient-Specific Goal (Individualization)  Description: Pt. Will follow recommendations of treatment team during hospital stay.   Pt. Will sleep 6-8 hours a night during hospital stay.  Pt. Will maintain ADLs without prompting during hospital stay.  Pt. Will be free from self harm during hospital stay.   Pt. Will attend > 50% of unit programing during hospital stay.     Pt 1-1 staffing for risk for falls.   Pt is to not have weight on his right foot and must wear the boot when out of bed  Pt to use cold pack on his foot for 20 minutes at a time. Try to do this every 1 hour when awake  Pt must use a wheel chair when out of bed  Pt will see podiatry on Monday  Pt to have 2-1 staffing to shower in the hallway shower  Pt is to elevate his foot in bed with a bed wedge  Pt must have an Ortho appointment before discharge    Pt needs a skin check of the foot with boot on once per shift    Outcome: Progressing   Goal Outcome Evaluation:    Plan of Care Reviewed With: patient      0730 Face to face rounding complete.  PT introduced to nursing for the shift.    Pt had severe pain in his right foot  this morning when he woke.  His instep was significantly swollen and slightly black and blue.  I told him to not to get up until we could try a walker.  Pt was not able to put any weight on his right foot even on the heel without significant pain.  Pt  was not able to make a sliding step with the walker.  Pt's provider was notified.  Pt had good pain relief from an ice pack.  Pt was given a wheel chair and showed how to transfer. He struggled when standing and pivoting,  Pt was given some ibuprofen for pain with good relief.  Pt was placed onto 1-1 nursing due to his syncope events and and weakness.  Pt rested in bed with his foot elevated icing 30 minutes every other hour.  Pt had Tylenol for pain this afternoon.  Pt declined the offer of Roxicodone this afternoon.  Pt given a boot to wear when he is up and instructed not to put weight on his right foot.     1500 Face to face end of shift report communicated to Evening Shift RN's along with Pt's fall risk.     Zheng Wilson RN  5/5/2023

## 2023-05-05 NOTE — PROGRESS NOTES
Behavioral Health Occupational Therapy Eval      Name: Connor Soares MRN# [id number]   Age: 33 year old YOB: 1990     Date of Consultation: May 5, 2023  Primary care provider: Falguni Ref-Primary, Physician    Referring Physician: Sherrie Parra NP  Orders: Eval and Treat  Medical Diagnosis:  Second metatarsal fracture proximally.    Onset of Illness/Injury: 5/4/23    Prior Level of Function: Prior to hospitalization patient recently had moved into a new apartment and was in the process of getting furniture. Patient able to complete ADLs.     Current Level of Function: Patient fell yesterday afternoon 5/4/23. Had complaints of dizziness prior to fall and was instructed to sit down. Patient staggered and fell backwards. Patient did complain of foot pain after the fall. X-ray completed with the following.FINDINGS:  There is an oblique fracture through the base of the second metatarsal. The remainder of the foot is intact. The articular spaces are normal in height.  OT consulted to assess ability to transfer. Patient has been moved into a fall safe room. At time of OT eval consults were placed but direct recommendations not received. Thus OT eval consisted of instructing patient to complete transfers non-weight bearing. Patient struggles with sit to stand from bed to transfer to . CGA to min assist of 1 with this. Transfer from  to toilet: SBA with use of grab bars.     Recommendation: Transfer to the strong side and remove armrest from  on the side that is closest to the patient/bed. When patient is in bed: WC should be at foot of bed and when patient transferring from  to the bed: WC should be at the head of the bed with armrest closest to the bed removed.   Recommend use of large shower for bathing.   Issued patient a purple elevating pillow to be used in bed and a wc cushion.   If transfer from bed continues to be a struggle might benefit from a hospital bed, discussed this with RN.  Patient is  a 1:1  Patient/Family Goal: remain fall free.     Fall Screen:   Have you fallen 2 or more times in the last year? No  Have you fallen and had an injury in the last year? Yes  Timed up & go: unable  Is patient a fall risk? Yes    Past Medical History:   No past medical history on file.    Past Surgical History:  No past surgical history on file.    Medications:   Current Facility-Administered Medications   Medication     acetaminophen (TYLENOL) tablet 650 mg     albuterol (PROVENTIL HFA/VENTOLIN HFA) inhaler     alum & mag hydroxide-simethicone (MAALOX) suspension 30 mL     artificial saliva (BIOTENE MT) solution 2 spray     calcium carbonate-vitamin D (CALTRATE) 600-10 MG-MCG per tablet 1 tablet     diclofenac (VOLTAREN) 1 % topical gel 4 g     hydrOXYzine (ATARAX) tablet 25 mg     ibuprofen (ADVIL/MOTRIN) tablet 600 mg     melatonin tablet 5 mg     naloxone (NARCAN) injection 0.2 mg    Or     naloxone (NARCAN) injection 0.4 mg    Or     naloxone (NARCAN) injection 0.2 mg    Or     naloxone (NARCAN) injection 0.4 mg     OLANZapine (zyPREXA) tablet 10 mg    Or     OLANZapine (zyPREXA) injection 10 mg     ondansetron (ZOFRAN ODT) ODT tab 4 mg     oxyCODONE (ROXICODONE) tablet 5 mg     QUEtiapine (SEROquel) tablet 200 mg     QUEtiapine (SEROquel) tablet 25 mg       Reason for OT Referral:  Mental Health History: Notable major depressive disorder versus schizoaffective disorder, depressive type, anxiety, and autism spectrum disorder.   Signs/Symptoms of compliant: Recent fall and fracture to the (R) foot.   Aggravating factors/Current Life Stressors: Worsening MH which led to admission.   Current Services:     Personal Information:  Family Structure: single  Living Arrangement: has apartment  Finances: unemployed       Physical Presentation:   Mobility: impaired, offloading of (R) foot. Patient is recommended to use a CAM boot.            Cognition:  Orientation:  time, place and person      Goals:   Remain  fall free    Planned Interventions: 1:1, WC use. Other environmental modifications.     Clinical Impressions:  Criteria for Skilled Therapeutic Intervention Met: yes  OT Diagnosis: broken metatarsal on the (R) footz, oblique fracture through the base of the second metatarsal.  Influenced by the following impairments: Fell yesterday  Functional limitations due to impairment: functional mobility  Clinical presentation: Evolving/Changing  Clinical presentation rationale: Patient to have a consult on Monday with Dr. Davila.   Clinical Decision making (complexity): Low Complexity  Predicted Duration of Therapy Intervention (days/wks): 1 x wk x 2 wks  Risks and Benefits of therapy have been explained: Yes  Patient, Family & other staff in agreement with plan of care: Yes  Comments: OT to continue per plan of care.     Total Evaluation Time: 30

## 2023-05-05 NOTE — CONSULTS
Dr. Avina and Sherrie Parra NP, called podiatry today regarding this patient's fracture. Patient fell on his inpatient unit on the behavioral health floor today and a fracture was identified on his radiographs. Recommendations were requested to determine patient's offloading status and future care for his foot.    Evaluated the radiographs from 05/05/2023:    FINDINGS:  There is an oblique fracture through the base of the second  metatarsal. The remainder of the foot is intact. The articular spaces  are normal in height.                                                                      IMPRESSION: Second metatarsal fracture proximally.       SIERRA WALTON MD        -There is an oblique and partially comminuted fracture at the base of the second metatarsal. The metatarsal parabola has been maintained and there is not significant displacement. Surgical intervention is not advised. However, given the location of the fracture at the base of the metatarsal, this area of the foot is heavily loaded with weight with walking. If possible, a long CAM boot is minimally advised. Sherrie stated that the patient is 375 pounds and he is currently resting in a wheelchair. If the long leg boot is too uncomfortable and digging into his skin or causes too much imbalance for him, then he may wear a short leg CAM walker boot instead. Ideally, he should fully offload the foot with crutches, a knee scooter or a wheelchair. Podiatry is not in the office today but can come see the patient on the floor on Monday, 05/08/2023. The patient is from the [facility]. When he is discharged, he will need podiatry or orthopaedic follow up to continue to evaluate that the fracture site is healing.

## 2023-05-05 NOTE — PROGRESS NOTES
"  [facility] PSYCHIATRY  -  PROGRESS NOTE     ID   Name: Connor Soares  MRN#: [id number]     SUBJECTIVE   Prior to interviewing the patient, I met with nursing and reviewed patient's clinical condition. We discussed clinical care both before and after the interview. I have reviewed the patient's clinical course by review of records including previous notes, labs, and vital signs.     Per nursing, the patient had the following behavioral events over the last 24-hours: none.    On psychiatric interview, he is met within his room. He is noted to copying the patient bill of rights in his journal. When asked why he is doing that, he responds \"because I think it is important information for people to know.\" He refuses to answer assessment questions, holding his hand up in a stop position stating \"I need a legal guardian to answer these questions for me\" while pointing to the patient bill of rights booklet. Patient does deny any suicidal thoughts and/or safety concerns. I did tell the patient that I spoke with his  and she does not know him to be like this while he was on Abilify and therefore I would encourage him to consider restarting it to which he did not answer me.         MEDICATIONS   Scheduled Meds:    QUEtiapine  200 mg Oral At Bedtime     PRN Meds:.acetaminophen, albuterol, alum & mag hydroxide-simethicone, artificial saliva, hydrOXYzine, melatonin, OLANZapine **OR** OLANZapine, QUEtiapine     ALLERGIES   No Known Allergies     VITALS   Vitals: /76 (BP Location: Left arm)   Pulse 92   Temp 98.5  F (36.9  C) (Tympanic)   Resp 16   Ht 1.829 m (6')   Wt (!) 170.1 kg (375 lb 1.6 oz)   SpO2 96%   BMI 50.87 kg/m       MENTAL STATUS EXAM   Appearance:  awake, alert, adequately groomed, dressed in hospital scrubs, and appeared as age stated  Attitude:  cooperative  Eye Contact:  good  Mood:  \"down\"  Affect:  mood congruent  Speech:  clear, coherent  Psychomotor Behavior:  no evidence of " tardive dyskinesia, dystonia, or tics  Thought Process:  logical, linear, and goal oriented  Associations:  no loose associations  Thought Content:  no evidence of suicidal ideation or homicidal ideation  Insight:  limited  Judgment:  limited  Oriented to:  time, person, and place  Attention Span and Concentration:  intact  Recent and Remote Memory:  intact  Gait and Station: Normal       LABS   No results found for this or any previous visit (from the past 24 hour(s)).      ASSESSMENT   Connor Soares is a 33 year old male with a past psychiatric history notable for MDD with psychotic features versus schizoaffective disorder and autism spectrum disorder. Multiple prior inpatient psychiatric hospitalizations. Medication nonadherence.  Presented to outside emergency department accompanied by law enforcement after being found disorganized in the community attempting to search for his dog that he lost over 7 years ago. He was subsequently sent to [facility] unit and started on Seroquel 50 mg at bedtime. Patient was subsequently transferred and accepted for inpatient psychiatric hospitalization at [facility] for further safety and further stabilization     Daily Progress: Patient refusing to talk with me.        DIAGNOSTIC FORMULATION   #major depressive disorder with psychotic features  #autism spectrum disorder  #medication nonadherence     PLAN     Location: Unit 5  Legal Status: Orders Placed This Encounter      Legal status 72 Hour Hold    Safety Assessment:    Behavioral Orders   Procedures     Code 1 - Restrict to Unit     Routine Programming     As clinically indicated     Status 15     Every 15 minutes.          PTA medications continued/changed:   -Increase Seroquel to 200 mg at bedtime    New medications tried and stopped:   -None    New medications initiated:   -None    Today's Changes:  -no changes made    Based on collateral received from patient's case manger  regarding previous success with Abilify and significant change since stopping, will encourage patient to restart tomorrow with diagnosis likely being schizoaffective disorder.     Programming: Patient will be treated in a therapeutic milieu with appropriate individual and group therapies. Education will be provided on diagnoses, medications, and treatments. Encouraged behavioral activation and participation in group programming.     Medical diagnoses:  Per medicine    Disposition: pending clinical course, home with services versus IRTS       ATTESTATION    Nicole Fuentes NP

## 2023-05-05 NOTE — PROGRESS NOTES
"  [facility] PSYCHIATRY  -  PROGRESS NOTE     ID   Name: Connor Soares  MRN#: [id number]     SUBJECTIVE   Prior to interviewing the patient, I met with nursing and reviewed patient's clinical condition. We discussed clinical care both before and after the interview. I have reviewed the patient's clinical course by review of records including previous notes, labs, and vital signs.     Per nursing, the patient had the following behavioral events over the last 24-hours: fall on unit last night, no LOC. No further imaging warranted.     On psychiatric interview, he is met within his room. He states he slept well last night. We discussed not having him take two antipsychotics (abilify and Seroquel). Agrees to discontinue abilify. Discussed upon stabilization, transitioning to home and then IRTS. He is agreeable. He is able to reflect on how he was not thinking \"right\" prior to admission and doing things like ripping up paper and attempting to flush it down the toilet instead of throwing it away.  He plans to contact his  today.        MEDICATIONS   Scheduled Meds:    QUEtiapine  200 mg Oral At Bedtime     PRN Meds:.acetaminophen, albuterol, alum & mag hydroxide-simethicone, artificial saliva, hydrOXYzine, melatonin, OLANZapine **OR** OLANZapine, QUEtiapine     ALLERGIES   No Known Allergies     VITALS   Vitals: /76 (BP Location: Left arm)   Pulse 92   Temp 98.5  F (36.9  C) (Tympanic)   Resp 16   Ht 1.829 m (6')   Wt (!) 170.1 kg (375 lb 1.6 oz)   SpO2 96%   BMI 50.87 kg/m       MENTAL STATUS EXAM   Appearance:  awake, alert, adequately groomed, dressed in hospital scrubs, and appeared as age stated  Attitude:  cooperative  Eye Contact:  good  Mood:  \"down\"  Affect:  mood congruent  Speech:  clear, coherent  Psychomotor Behavior:  no evidence of tardive dyskinesia, dystonia, or tics  Thought Process:  logical, linear, and goal oriented  Associations:  no loose associations  Thought " Content:  no evidence of suicidal ideation or homicidal ideation  Insight:  limited  Judgment:  limited  Oriented to:  time, person, and place  Attention Span and Concentration:  intact  Recent and Remote Memory:  intact  Gait and Station: Normal       LABS   No results found for this or any previous visit (from the past 24 hour(s)).      ASSESSMENT   Connor Soares is a 33 year old male with a past psychiatric history notable for MDD with psychotic features versus schizoaffective disorder and autism spectrum disorder. Multiple prior inpatient psychiatric hospitalizations. Medication nonadherence.  Presented to outside emergency department accompanied by law enforcement after being found disorganized in the community attempting to search for his dog that he lost over 7 years ago. He was subsequently sent to [facility] unit and started on Seroquel 50 mg at bedtime. Patient was subsequently transferred and accepted for inpatient psychiatric hospitalization at [facility] for further safety and further stabilization     Daily Progress: had a fall on unit yesterday, likely secondary to combination of seroquel and abilify. Discontinue abilify.        DIAGNOSTIC FORMULATION   #major depressive disorder with psychotic features  #autism spectrum disorder  #medication nonadherence     PLAN     Location: Unit 5  Legal Status: Orders Placed This Encounter      Legal status 72 Hour Hold    Safety Assessment:    Behavioral Orders   Procedures     Code 1 - Restrict to Unit     Routine Programming     As clinically indicated     Status 15     Every 15 minutes.          PTA medications continued/changed:   -none    New medications tried and stopped:   -None    New medications initiated:   -seroquel --> increased to 200 mg at bedtime     Today's Changes:  -discontinue abilify   -maintain seroquel at 200 mg at bedtime     Programming: Patient will be treated in a therapeutic milieu with  appropriate individual and group therapies. Education will be provided on diagnoses, medications, and treatments. Encouraged behavioral activation and participation in group programming.     Medical diagnoses:  Per medicine    Disposition: pending clinical course , home with services versus IRTS       TREATMENT TEAM CARE PLAN     Progress: Continued symptoms.    Continued Stay Criteria/Rationale: Continued symptoms without sufficient improvement/resolution.    Medical/Physical: See above.    Precautions: See above.     Plan: Continue inpatient care with unit support and medication management.    Rationale for change in precautions or plan: NA due to no change.    Participants: Andrew Monteiro MD, Nursing, SW, OT.    The patient's care was discussed with the treatment team and chart notes were reviewed.       ATTESTATION    Andrew Monteiro MD  [facility]   Psychiatry    Video Visit: Patient has given verbal consent for video visit?: Yes  Type of Service: video visit for mental health treatment  Reason for Video Visit: COVID-19 [place]  Originating Site (patient location): [facility]  Distant Site (provider location): Remote Location  Mode of Communication: Video Conference via Client [employer]  Time of Service: Date: 05/05/2023 , Start: 08:30 end: 09:00

## 2023-05-05 NOTE — PROGRESS NOTES
"[facility]    Medical Services Progress Note    Date of Service (when I saw the patient): 05/05/2023      Assessment & Plan     Principal Problem:    Psychosis (H)    Active Medical Problems:  Morbid obesity- 170.1 kg. BMI 50.87. Reports he was started on ozempic and only took it 2 weeks before stopping due to side effects. Last A1c in August 5.6.  Glucose in the ED wnl  at 89. A1c today is 5.7 which does put him in the prediabetic range.     Prediabetes- a1c 5.7. Discussed with him about starting metformin. We discussed side effects of the medication as well as the risks and benefits of starting it. Discussed weight loss, exercise and diet. Foods to avoid or limit such as breads, sugars, pasta.  He is not interested in starting it  and states \"I will pass on the metformin, I will try and lose weight.\"    Exercise induced asthma- reports he last used his albuterol inhaler 6 months ago. Denies chest pain, sob. Albuterol inhaler as needed.   5/5- denies chest pain, sob, difficulty breathing.     High fall risk- pt reports he was in bed and agitated, anxious and tense and got up to go to the nurses station to ask for medication and after he got to the nurses station he had a \"dizzy spell\" and grabbed the table and fell backwards on his back. He denies any back pain, hip pain, denies hitting his head. Denies loss of consciousness. He reports he twisted his right foot and now can't bear weight on it. There is so edema noted, no erythema noted. He has limited range of motion. Pt believes his Abilify and seroquel made him dizzy.  Pt was provided a tap bell, wheelchair and room moved near the nurses station. OT consult.     Right foot pain- see note above. Xray ordered.  FINDINGS:  There is an oblique fracture through the base of the second metatarsal. The remainder of the foot is intact. The articular spaces are normal in height. IMPRESSION: Second metatarsal fracture proximally.    Consulted with Dr. Avina and " Dr. Davila. Dr. Davila reviewed xrays and recommended a long CAM boot, and fully offload with crutches, knee scooter, wheelchair. He is currently in a wheelchair and CAM boot given to nurse with instructions, skin and foot to be assessed q shift. Care plan to be updated.   He will need follow up appointment set up with podiatry or ortho at discharge. Dr. Davila will see the pt on the unit on Monday. Ice as needed. Tylenol, ibuprofen, diclofeneac as needed. Oxycodone as needed for 3 days. Can transition to tramadol if needed. Vitamin D level ordered. Will start Caltrate for bone healing. Pt placed on 1:1 for safety. Discussed plan with pt and he is appreciative.     Xerostomia- reports he is drinking plenty of water but feels his mouth is dry. He reports he always has issues with dry mouth. Biotene ordered.       Code Status: Full Code.    Sherrie Parra CNP        -Data reviewed today: I reviewed all new labs and imaging results over the last 24 hours.     Physical Exam   Temp: 98.5  F (36.9  C) Temp src: Tympanic BP: 125/76 Pulse: 92   Resp: 16 SpO2: 96 % O2 Device: None (Room air)    Vitals:    04/30/23 1500   Weight: (!) 170.1 kg (375 lb 1.6 oz)     Vital Signs with Ranges  Temp:  [98.1  F (36.7  C)-98.7  F (37.1  C)] 98.5  F (36.9  C)  Pulse:  [] 92  Resp:  [16] 16  BP: (100-130)/(62-92) 125/76  SpO2:  [93 %-96 %] 96 %  No intake/output data recorded.    Constitutional: awake, alert, cooperative, no apparent distress, and appears stated age. Vitals stable   Respiratory: No increased work of breathing, good air exchange, clear to auscultation bilaterally, no crackles or wheezing  Cardiovascular: Normal apical impulse, regular rate and rhythm, normal S1 and S2, no S3 or S4, and no murmur noted  Skin: right foot swelling, no bruising, otherwise normal skin color, texture, turgor  Musculoskeletal: There is no redness, warmth, or swelling to right foot,  Limited range of motion noted to right foot.     Neurologic: Awake, alert, oriented to name, place and time.    Neuropsychiatric: General: normal, calm and normal eye contact    Medications     QUEtiapine  200 mg Oral At Bedtime       Data   Recent Labs   Lab 04/29/23  1627   WBC 10.1   HGB 14.6   MCV 89         POTASSIUM 4.2   CHLORIDE 101   CO2 25   BUN 8.4   CR 0.97   ANIONGAP 13   ABDULLAHI 9.7   GLC 89   ALBUMIN 4.2   PROTTOTAL 7.2   BILITOTAL 0.4   ALKPHOS 83   ALT 46   AST 23       No results found for this or any previous visit (from the past 24 hour(s)).

## 2023-05-05 NOTE — PROGRESS NOTES
05/04/23 2308   Vital Signs   Temp 98.1  F (36.7  C)   Temp src Tympanic   Resp 16   Pulse 62   Pulse Rate Source Monitor   /62   BP Location Left arm   Oxygen Therapy   SpO2 93 %   O2 Device None (Room air)     At 2300, this Pt came to Nursing station door requesting prn from RN.  While RN checking Epic for prn possibilities, this Pt stated that he was becoming dizzy.  UA asked Pt top go sit down.  Pt turned away from Nurses station door as if to follow directive to go sit down on a chair & staggered & stumbled onto the floor falling backwards, witnessed by this writer.  Pt did not hit his head.  Appeared that his legs gave out.  Landing was semi-soft, not a dead drop to floor.  Pt assessed by RN & vital signs listed above from this witnessed fall.  Pt c/o right foot medial arch & top of foot pain.  RN monitoring after this.  Given tap bell.  Instructed not to stand by self if needing to get up tonight.  Pt was sleeping majority of day today.  Given Ice water & fluids encouraged.  Will continue to monitor.

## 2023-05-05 NOTE — PROGRESS NOTES
1:1 time with the patient.  No changes made to the discharge plan at this time.   See the AVS for follow up appointments and recommendations.

## 2023-05-06 ENCOUNTER — APPOINTMENT (OUTPATIENT)
Dept: GENERAL RADIOLOGY | Facility: HOSPITAL | Age: 33
DRG: 885 | End: 2023-05-06
Attending: NURSE PRACTITIONER
Payer: MEDICARE

## 2023-05-06 LAB
HOLD SPECIMEN: NORMAL
HOLD SPECIMEN: NORMAL

## 2023-05-06 PROCEDURE — 250N000013 HC RX MED GY IP 250 OP 250 PS 637: Performed by: PSYCHIATRY & NEUROLOGY

## 2023-05-06 PROCEDURE — 124N000001 HC R&B MH

## 2023-05-06 PROCEDURE — 250N000013 HC RX MED GY IP 250 OP 250 PS 637: Performed by: NURSE PRACTITIONER

## 2023-05-06 PROCEDURE — 99232 SBSQ HOSP IP/OBS MODERATE 35: CPT | Mod: GT | Performed by: PSYCHIATRY & NEUROLOGY

## 2023-05-06 PROCEDURE — 73030 X-RAY EXAM OF SHOULDER: CPT | Mod: RT

## 2023-05-06 RX ADMIN — OXYCODONE HYDROCHLORIDE 5 MG: 5 TABLET ORAL at 14:44

## 2023-05-06 RX ADMIN — IBUPROFEN 600 MG: 600 TABLET ORAL at 20:28

## 2023-05-06 RX ADMIN — ACETAMINOPHEN 325MG 650 MG: 325 TABLET ORAL at 13:19

## 2023-05-06 RX ADMIN — MELATONIN 5 MG TABLET 5 MG: at 22:27

## 2023-05-06 RX ADMIN — ACETAMINOPHEN 325MG 650 MG: 325 TABLET ORAL at 20:28

## 2023-05-06 RX ADMIN — OXYCODONE HYDROCHLORIDE 5 MG: 5 TABLET ORAL at 23:36

## 2023-05-06 RX ADMIN — CALCIUM CARBONATE 600 MG (1,500 MG)-VITAMIN D3 400 UNIT TABLET 1 TABLET: at 08:26

## 2023-05-06 RX ADMIN — QUETIAPINE FUMARATE 200 MG: 200 TABLET ORAL at 20:12

## 2023-05-06 RX ADMIN — CALCIUM CARBONATE 600 MG (1,500 MG)-VITAMIN D3 400 UNIT TABLET 1 TABLET: at 17:34

## 2023-05-06 RX ADMIN — IBUPROFEN 600 MG: 600 TABLET ORAL at 13:19

## 2023-05-06 RX ADMIN — IBUPROFEN 600 MG: 600 TABLET ORAL at 06:39

## 2023-05-06 RX ADMIN — ACETAMINOPHEN 325MG 650 MG: 325 TABLET ORAL at 08:26

## 2023-05-06 ASSESSMENT — ACTIVITIES OF DAILY LIVING (ADL)
LAUNDRY: UNABLE TO COMPLETE
ORAL_HYGIENE: INDEPENDENT
HYGIENE/GROOMING: INDEPENDENT
ORAL_HYGIENE: INDEPENDENT
ADLS_ACUITY_SCORE: 33
ADLS_ACUITY_SCORE: 35
ADLS_ACUITY_SCORE: 33
LAUNDRY: UNABLE TO COMPLETE
HYGIENE/GROOMING: INDEPENDENT
ADLS_ACUITY_SCORE: 33
ADLS_ACUITY_SCORE: 33
ADLS_ACUITY_SCORE: 35
ADLS_ACUITY_SCORE: 35
ADLS_ACUITY_SCORE: 33
DRESS: SCRUBS (BEHAVIORAL HEALTH);INDEPENDENT
ADLS_ACUITY_SCORE: 35
ADLS_ACUITY_SCORE: 33
ADLS_ACUITY_SCORE: 33
ADLS_ACUITY_SCORE: 35
DRESS: SCRUBS (BEHAVIORAL HEALTH);INDEPENDENT

## 2023-05-06 NOTE — PLAN OF CARE
SHIFT SUMMARY:  Fall precautions. 1:1 continues. Medical bed in use. CAM boot on when OOB. Reported right shoulder pain. Provider notified. Off unit  for x-ray with 1 staff and 2 security officers. Transfers with SBA and gait belt. Non-weight bearing on right foot/leg. Ice pack in pillowcase PRN to right foot. PRN PO roxicodone administered at 23:36 related to right foot and right shoulder pain. PRN PO Tylenol 650 mg and advil 600 mg administered related to right foot and shoulder pain.    Care continues into NOC shift.    Observed resting with eyes closed, respirations even, unlabored and WNL. Able to reposition self and make needs known. 15-minute checks in place. Slept approximately 6 hours.     Problem: Adult Behavioral Health Plan of Care  Goal: Patient-Specific Goal (Individualization)  Description: Pt. Will follow recommendations of treatment team during hospital stay.   Pt. Will sleep 6-8 hours a night during hospital stay.  Pt. Will maintain ADLs without prompting during hospital stay.  Pt. Will be free from self harm during hospital stay.   Pt. Will attend > 50% of unit programing during hospital stay.     Pt 1-1 staffing for risk for falls.   Pt is to not have weight on his right foot and must wear the boot when out of bed  Pt to use cold pack on his foot for 20 minutes at a time. Try to do this every 1 hour when awake  Pt must use a wheel chair when out of bed  Pt will see podiatry on Monday  Pt to have 2-1 staffing to shower in the hallway shower  Pt is to elevate his foot in bed with a bed wedge  Pt must have an Ortho appointment before discharge    Pt needs a skin check of the foot with boot on once per shift  Outcome: Progressing     Problem: Psychotic Signs/Symptoms  Goal: Improved Behavioral Control (Psychotic Signs/Symptoms)  Description: Patient will identify 2 healthy coping skills for when anxious.  Outcome: Progressing     Problem: Suicidal Behavior  Goal: Suicidal Behavior is Absent or  Managed  Outcome: Progressing     Problem: Fall Injury Risk  Goal: Absence of Fall and Fall-Related Injury  Description: Patient will use tap bell at bedside for assistance.  Patient will wear non skid footwear.  Outcome: Progressing   Goal Outcome Evaluation:

## 2023-05-06 NOTE — PLAN OF CARE
SHIFT SUMMARY: 1:1 continues. Fall precautions.  Observed resting with eyes closed, respirations even, unlabored and WNL. Able to reposition self and make needs known. 15-minute checks in place. Slept approximately 6.25 hours.       Problem: Adult Behavioral Health Plan of Care  Goal: Patient-Specific Goal (Individualization)  Description: Pt. Will follow recommendations of treatment team during hospital stay.   Pt. Will sleep 6-8 hours a night during hospital stay.  Pt. Will maintain ADLs without prompting during hospital stay.  Pt. Will be free from self harm during hospital stay.   Pt. Will attend > 50% of unit programing during hospital stay.     Pt 1-1 staffing for risk for falls.   Pt is to not have weight on his right foot and must wear the boot when out of bed  Pt to use cold pack on his foot for 20 minutes at a time. Try to do this every 1 hour when awake  Pt must use a wheel chair when out of bed  Pt will see podiatry on Monday  Pt to have 2-1 staffing to shower in the hallway shower  Pt is to elevate his foot in bed with a bed wedge  Pt must have an Ortho appointment before discharge    Pt needs a skin check of the foot with boot on once per shift  Outcome: Progressing     Problem: Psychotic Signs/Symptoms  Goal: Improved Behavioral Control (Psychotic Signs/Symptoms)  Description: Patient will identify 2 healthy coping skills for when anxious.  Outcome: Progressing     Problem: Suicidal Behavior  Goal: Suicidal Behavior is Absent or Managed  Outcome: Progressing     Problem: Fall Injury Risk  Goal: Absence of Fall and Fall-Related Injury  Description: Patient will use tap bell at bedside for assistance.  Patient will wear non skid footwear.  Outcome: Progressing   Goal Outcome Evaluation:

## 2023-05-06 NOTE — PROGRESS NOTES
[facility] PSYCHIATRY  -  PROGRESS NOTE     ID   Name: Connor Soares  MRN#: [id number]     SUBJECTIVE   Prior to interviewing the patient, I met with nursing and reviewed patient's clinical condition. We discussed clinical care both before and after the interview. I have reviewed the patient's clinical course by review of records including previous notes, labs, and vital signs.     Per nursing, the patient had the following behavioral events over the last 24-hours: fall on unit on 05/04/23 resulted in metatarsal fracture. Orthopedics consulted and will follow-up with patient on Monday. Boot and pain management provided.     On psychiatric interview, he is met within his room. He states he is doing well. Reports the pain in his foot is well controlled. Reports he slept well last night. We talked about his resources in the outpatient setting and his lack of follow through with said resources. We also talked about his supports, such as family and his resistance to utilization of those supports when he is not doing well. He agrees to contact his mother as he does not believe any of his family are aware that he is in the hospital. He denies SI today.        MEDICATIONS   Scheduled Meds:    calcium carbonate-vitamin D  1 tablet Oral BID w/meals     QUEtiapine  200 mg Oral At Bedtime     PRN Meds:.acetaminophen, albuterol, alum & mag hydroxide-simethicone, artificial saliva, diclofenac, hydrOXYzine, ibuprofen, melatonin, naloxone **OR** naloxone **OR** naloxone **OR** naloxone, OLANZapine **OR** OLANZapine, ondansetron, oxyCODONE, QUEtiapine     ALLERGIES   No Known Allergies     VITALS   Vitals: /76 (BP Location: Left arm)   Pulse 92   Temp 98.3  F (36.8  C) (Tympanic)   Resp 16   Ht 1.829 m (6')   Wt (!) 170.1 kg (375 lb 1.6 oz)   SpO2 97%   BMI 50.87 kg/m       MENTAL STATUS EXAM   Appearance:  awake, alert, adequately groomed, dressed in hospital scrubs, and appeared as age stated  Attitude:   "cooperative  Eye Contact:  good  Mood:  \"okay\"  Affect:  mood congruent  Speech:  clear, coherent  Psychomotor Behavior:  no evidence of tardive dyskinesia, dystonia, or tics  Thought Process:  logical, linear, and goal oriented  Associations:  no loose associations  Thought Content:  no evidence of suicidal ideation or homicidal ideation  Insight:  limited  Judgment:  limited  Oriented to:  time, person, and place  Attention Span and Concentration:  intact  Recent and Remote Memory:  intact  Gait and Station: Normal       LABS   No results found for this or any previous visit (from the past 24 hour(s)).      ASSESSMENT   Connor Soares is a 33 year old male with a past psychiatric history notable for MDD with psychotic features versus schizoaffective disorder and autism spectrum disorder. Multiple prior inpatient psychiatric hospitalizations. Medication nonadherence.  Presented to outside emergency department accompanied by law enforcement after being found disorganized in the community attempting to search for his dog that he lost over 7 years ago. He was subsequently sent to [facility] unit and started on Seroquel 50 mg at bedtime. Patient was subsequently transferred and accepted for inpatient psychiatric hospitalization at [facility] for further safety and further stabilization     Daily Progress: no changes to medications today. Ortho consulted and will plan to see patient on Monday, 5/8/23       DIAGNOSTIC FORMULATION   #major depressive disorder with psychotic features  #autism spectrum disorder  #medication nonadherence     PLAN     Location: Unit 5  Legal Status: Orders Placed This Encounter      Legal status 72 Hour Hold    Safety Assessment:    Behavioral Orders   Procedures     Code 1 - Restrict to Unit     Routine Programming     As clinically indicated     Status 15     Every 15 minutes.     Status Individual Observation     Patient SIO status reviewed " with team/RN.  Please also refer to RN/team documentation for add'l detail.    -SIO staff to monitor following which have contributed to patient being on SIO:  Syncope, broken right foot, and generalized weakness  -Possible interventions SIO staff could use to support patient's treatment progress:  Gait belt, boot on when up, no weight bearing right foot, wheel chair, and use hallway shower 2-1 staffi ng  -When following observed, team will review discontinuation of SIO:  N/A     Order Specific Question:   CONTINUOUS 24 hours / day     Answer:   5 feet     Order Specific Question:   Indications for SIO     Answer:   Self-injury risk          PTA medications continued/changed:   -none    New medications tried and stopped:   -None    New medications initiated:   -seroquel --> increased to 200 mg at bedtime     Today's Changes:  -discontinue abilify   -maintain seroquel at 200 mg at bedtime     Programming: Patient will be treated in a therapeutic milieu with appropriate individual and group therapies. Education will be provided on diagnoses, medications, and treatments. Encouraged behavioral activation and participation in group programming.     Medical diagnoses:  Per medicine    #Oblique Fracture through base of the second metatarsal  -pain medication ordered  -ortho to see 05/8/23    Disposition: pending clinical course , home with services versus IRTS       TREATMENT TEAM CARE PLAN     Progress: Continued symptoms.    Continued Stay Criteria/Rationale: Continued symptoms without sufficient improvement/resolution.    Medical/Physical: See above.    Precautions: See above.     Plan: Continue inpatient care with unit support and medication management.    Rationale for change in precautions or plan: NA due to no change.    Participants: Andrew Monteiro MD, Nursing, SW, OT.    The patient's care was discussed with the treatment team and chart notes were reviewed.       ATTESTATION    Andrew Monteiro MD  [place]  [place]   Psychiatry    Video Visit: Patient has given verbal consent for video visit?: Yes  Type of Service: video visit for mental health treatment  Reason for Video Visit: COVID-19 [place]  Originating Site (patient location): [facility]  Distant Site (provider location): Remote Location  Mode of Communication: Video Conference via [employer]  Time of Service: Date: 05/06/2023 , Start: 08:30 end: 09:00

## 2023-05-06 NOTE — PLAN OF CARE
"  Problem: Adult Behavioral Health Plan of Care  Goal: Patient-Specific Goal (Individualization)  Description: Pt. Will follow recommendations of treatment team during hospital stay.   Pt. Will sleep 6-8 hours a night during hospital stay.  Pt. Will maintain ADLs without prompting during hospital stay.  Pt. Will be free from self harm during hospital stay.   Pt. Will attend > 50% of unit programing during hospital stay.     Pt 1-1 staffing for risk for falls.   Pt is to not have weight on his right foot and must wear the boot when out of bed  Pt to use cold pack on his foot for 20 minutes at a time. Try to do this every 1 hour when awake  Pt must use a wheel chair when out of bed  Pt will see podiatry on Monday  Pt to have 2-1 staffing to shower in the hallway shower  Pt is to elevate his foot in bed with a bed wedge  Pt must have an Ortho appointment before discharge    Pt needs a skin check of the foot with boot on once per shift  Outcome: Progressing   Goal Outcome Evaluation:    Plan of Care Reviewed With: patient         Patient is Alert, oriented. Able to make needs known. VSS, afebrile. Assessment and vitals as charted. Orthos completed. Patient does complain of right foot pain. Scant amount of swelling noted. Some slight bruising to top of foot. CMS intact. +2 pulses BLE. Requested and received PRN ibuprofen x 1 with relief noted. Foot elevated while in bed. Ice as needed. CAM boot to R  on while out of bed non weight bearing. Pivots from wheelchair to bed, toilet. He has been transferring without any issues. 1:1 at bed side d/t fall risk as well as hospital bed.   He states he is \"feeling well\". Denies SI, SIB, HI, anxiety, or hallucinations. Very pleasant with conversation. Took scheduled medications. No falls.      Face to face report given with opportunity to observe patient.    Report given to MILENA TORIBIO.     Amelia Levine RN   5/5/2023  11:29 PM          "

## 2023-05-07 PROCEDURE — 250N000013 HC RX MED GY IP 250 OP 250 PS 637: Performed by: NURSE PRACTITIONER

## 2023-05-07 PROCEDURE — 99233 SBSQ HOSP IP/OBS HIGH 50: CPT | Mod: GT | Performed by: PSYCHIATRY & NEUROLOGY

## 2023-05-07 PROCEDURE — 250N000013 HC RX MED GY IP 250 OP 250 PS 637: Performed by: PSYCHIATRY & NEUROLOGY

## 2023-05-07 PROCEDURE — 124N000001 HC R&B MH

## 2023-05-07 RX ORDER — POLYETHYLENE GLYCOL 3350 17 G/17G
17 POWDER, FOR SOLUTION ORAL DAILY
Status: DISCONTINUED | OUTPATIENT
Start: 2023-05-07 | End: 2023-05-16 | Stop reason: HOSPADM

## 2023-05-07 RX ADMIN — ACETAMINOPHEN 325MG 650 MG: 325 TABLET ORAL at 12:29

## 2023-05-07 RX ADMIN — IBUPROFEN 600 MG: 600 TABLET ORAL at 06:03

## 2023-05-07 RX ADMIN — DICLOFENAC SODIUM 4 G: 10 GEL TOPICAL at 12:46

## 2023-05-07 RX ADMIN — OXYCODONE HYDROCHLORIDE 5 MG: 5 TABLET ORAL at 14:09

## 2023-05-07 RX ADMIN — CALCIUM CARBONATE 600 MG (1,500 MG)-VITAMIN D3 400 UNIT TABLET 1 TABLET: at 09:05

## 2023-05-07 RX ADMIN — ACETAMINOPHEN 325MG 650 MG: 325 TABLET ORAL at 06:03

## 2023-05-07 RX ADMIN — QUETIAPINE FUMARATE 200 MG: 200 TABLET ORAL at 20:05

## 2023-05-07 RX ADMIN — MELATONIN 5 MG TABLET 5 MG: at 20:05

## 2023-05-07 RX ADMIN — IBUPROFEN 600 MG: 600 TABLET ORAL at 12:29

## 2023-05-07 RX ADMIN — CALCIUM CARBONATE 600 MG (1,500 MG)-VITAMIN D3 400 UNIT TABLET 1 TABLET: at 17:14

## 2023-05-07 RX ADMIN — POLYETHYLENE GLYCOL 3350 17 G: 17 POWDER, FOR SOLUTION ORAL at 12:29

## 2023-05-07 ASSESSMENT — ACTIVITIES OF DAILY LIVING (ADL)
LAUNDRY: UNABLE TO COMPLETE
ADLS_ACUITY_SCORE: 33
LAUNDRY: UNABLE TO COMPLETE
ADLS_ACUITY_SCORE: 42
HYGIENE/GROOMING: SHOWER;WITH ASSISTANCE
DRESS: SCRUBS (BEHAVIORAL HEALTH);INDEPENDENT
HYGIENE/GROOMING: SHOWER;WITH ASSISTANCE;WITH SUPERVISION
ADLS_ACUITY_SCORE: 33
ADLS_ACUITY_SCORE: 52
ADLS_ACUITY_SCORE: 52
ORAL_HYGIENE: INDEPENDENT
ADLS_ACUITY_SCORE: 42
ADLS_ACUITY_SCORE: 33
ADLS_ACUITY_SCORE: 42
ADLS_ACUITY_SCORE: 42
ADLS_ACUITY_SCORE: 33
ADLS_ACUITY_SCORE: 33
ORAL_HYGIENE: INDEPENDENT
ADLS_ACUITY_SCORE: 33

## 2023-05-07 NOTE — PROGRESS NOTES
"  [facility] PSYCHIATRY  -  PROGRESS NOTE     ID   Name: Connor Soares  MRN#: [id number]     SUBJECTIVE   Prior to interviewing the patient, I met with nursing and reviewed patient's clinical condition. We discussed clinical care both before and after the interview. I have reviewed the patient's clinical course by review of records including previous notes, labs, and vital signs.     Per nursing, the patient had the following behavioral events over the last 24-hours: none, remains on 1:!    On psychiatric interview, he is met within his room. He states he feels \"good\". He states he is having less racing thoughts and better able to think clearly. He reports improvement from his foot pain. He states he plans to go to groups today. He denies SI. He plans to call his mother today (was not able to get ahold of her yesterday). Remains on 1:!       MEDICATIONS   Scheduled Meds:    calcium carbonate-vitamin D  1 tablet Oral BID w/meals     QUEtiapine  200 mg Oral At Bedtime     PRN Meds:.acetaminophen, albuterol, alum & mag hydroxide-simethicone, artificial saliva, diclofenac, hydrOXYzine, ibuprofen, melatonin, naloxone **OR** naloxone **OR** naloxone **OR** naloxone, OLANZapine **OR** OLANZapine, ondansetron, oxyCODONE, QUEtiapine     ALLERGIES   No Known Allergies     VITALS   Vitals: /82 (BP Location: Left arm)   Pulse 92   Temp 97.3  F (36.3  C) (Tympanic)   Resp 16   Ht 1.829 m (6')   Wt (!) 170.1 kg (375 lb 1.6 oz)   SpO2 92%   BMI 50.87 kg/m       MENTAL STATUS EXAM   Appearance:  awake, alert, adequately groomed, dressed in hospital scrubs, and appeared as age stated  Attitude:  cooperative  Eye Contact:  good  Mood:  \"good\"  Affect:  mood congruent  Speech:  clear, coherent  Psychomotor Behavior:  no evidence of tardive dyskinesia, dystonia, or tics  Thought Process:  logical, linear, and goal oriented  Associations:  no loose associations  Thought Content:  no evidence of suicidal ideation or " homicidal ideation  Insight:  limited  Judgment:  limited  Oriented to:  time, person, and place  Attention Span and Concentration:  intact  Recent and Remote Memory:  intact  Gait and Station: Normal       LABS   No results found for this or any previous visit (from the past 24 hour(s)).      ASSESSMENT   Connor Soares is a 33 year old male with a past psychiatric history notable for MDD with psychotic features versus schizoaffective disorder and autism spectrum disorder. Multiple prior inpatient psychiatric hospitalizations. Medication nonadherence.  Presented to outside emergency department accompanied by law enforcement after being found disorganized in the community attempting to search for his dog that he lost over 7 years ago. He was subsequently sent to [facility] unit and started on Seroquel 50 mg at bedtime. Patient was subsequently transferred and accepted for inpatient psychiatric hospitalization at [facility] for further safety and further stabilization     Daily Progress: Ortho consulted and will plan to see patient on Monday, 5/8/23       DIAGNOSTIC FORMULATION   #major depressive disorder with psychotic features  #autism spectrum disorder  #medication nonadherence     PLAN     Location: Unit 5  Legal Status: Orders Placed This Encounter      Legal status 72 Hour Hold    Safety Assessment:    Behavioral Orders   Procedures     Code 1 - Restrict to Unit     Routine Programming     As clinically indicated     Status 15     Every 15 minutes.     Status Individual Observation     Patient SIO status reviewed with team/RN.  Please also refer to RN/team documentation for add'l detail.    -SIO staff to monitor following which have contributed to patient being on SIO:  Syncope, broken right foot, and generalized weakness  -Possible interventions SIO staff could use to support patient's treatment progress:  Gait belt, boot on when up, no weight bearing right foot,  wheel chair, and use hallway shower 2-1 staffi ng  -When following observed, team will review discontinuation of SIO:  N/A     Order Specific Question:   CONTINUOUS 24 hours / day     Answer:   5 feet     Order Specific Question:   Indications for SIO     Answer:   Self-injury risk          PTA medications continued/changed:   -none    New medications tried and stopped:   -None    New medications initiated:   -seroquel --> increased to 200 mg at bedtime     Today's Changes:  -discontinue abilify   -maintain seroquel at 200 mg at bedtime     Programming: Patient will be treated in a therapeutic milieu with appropriate individual and group therapies. Education will be provided on diagnoses, medications, and treatments. Encouraged behavioral activation and participation in group programming.     Medical diagnoses:  Per medicine    #Oblique Fracture through base of the second metatarsal  -pain medication ordered  -ortho to see 05/8/23    Disposition: pending clinical course , home with services versus IRTS       TREATMENT TEAM CARE PLAN     Progress: Continued symptoms.    Continued Stay Criteria/Rationale: Continued symptoms without sufficient improvement/resolution.    Medical/Physical: See above.    Precautions: See above.     Plan: Continue inpatient care with unit support and medication management.    Rationale for change in precautions or plan: NA due to no change.    Participants: Andrew Monteiro MD, Nursing, SW, OT.    The patient's care was discussed with the treatment team and chart notes were reviewed.       ATTESTATION    Andrew Monteiro MD  [facility]   Psychiatry    Video Visit: Patient has given verbal consent for video visit?: Yes  Type of Service: video visit for mental health treatment  Reason for Video Visit: COVID-19 [place]  Originating Site (patient location): [facility]  Distant Site (provider location): Remote Location  Mode of Communication: Video  Conference via [employer]  Time of Service: Date: 05/07/2023 , Start: 08:30 end: 09:00

## 2023-05-07 NOTE — PLAN OF CARE
Problem: Suicidal Behavior  Goal: Suicidal Behavior is Absent or Managed  Outcome: Progressing    Pt denies SI     Problem: Psychotic Signs/Symptoms  Goal: Improved Behavioral Control (Psychotic Signs/Symptoms)  Description: Patient will identify 2 healthy coping skills for when anxious.  Outcome: Progressing    Pt is not paranoid and seems reality based today     Problem: Adult Behavioral Health Plan of Care  Goal: Patient-Specific Goal (Individualization)  Description: Pt. Will follow recommendations of treatment team during hospital stay.   Pt. Will sleep 6-8 hours a night during hospital stay.  Pt. Will maintain ADLs without prompting during hospital stay.  Pt. Will be free from self harm during hospital stay.   Pt. Will attend > 50% of unit programing during hospital stay.     Pt 1-1 staffing for risk for falls.   Pt is to not have weight on his right foot and must wear the boot when out of bed  Pt to use cold packs on his foot for 20 minutes at a time elevated. Try to do this every other 1 hour when awake  Pt must use a wheel chair when out of bed  Pt will see podiatry on Monday  Pt to have 2-1 staffing to shower in the hallway shower  Pt is to elevate his foot in bed with a bed wedge  Pt must have an Ortho appointment before discharge    Pt needs a skin check of the foot with boot on once per shift  5/7/2023 1113 by Zheng Wilson RN  Outcome: Progressing   Goal Outcome Evaluation:    0730 Face to face rounding complete.  Pt introduced to nursing for the shift.      Pt was up for breakfast with his boot on in his wheel chair.  After breakfast I assessed his right foot. He had a bruise on the instep about 2 cm long and 3 mm wide that followed the crease of his sock.  Pt's foot was significantly swollen that the sock appears to have cut in a bit.  Pt was encouraged to keep the sock off and put the boot on very loose.  With the sock off the bruising dissipated after a few hours.  Pt's foot was warm to touch  with limited range of motion.  He pulse is strong and marked.  He has some swelling of his calf and around his ankle today.  His pain was managed with ice, tylenol, and Ibuprofen until around 1400.  Pt received Roxicodone at 1400 for 7 out of 10 pain.  He showered today and struggled to stay up on his left leg and pull his pants off and on.  Two staff needed to assist him to get the pants up and keep him steady.  Pt was able to transfer mostly independently from wheel chair to bed and onto the shower chair.  Pt showered himself independently in the shower chair.  Pt's foot was iced with ice packs above and below with a blanket on top for 20 minutes on and  an hour between icing.  Pt lay on his left side in bed to help with shoulder discomfort from laying flat on his back.  Pt reports his mood to be fine and did not have any paranoid thinking.      Pt is extremely de-conditioned from immobility prior to admit.  He struggles to hold his weight on his left leg when transferring.  He may need skilled nursing rehab for discharge to prevent further injury.      1500 Face to face end of shift report communicated to Evening Shift RN's along with Pt's fall risk.      Zheng Wilson RN  5/7/2023

## 2023-05-07 NOTE — PLAN OF CARE
SHIFT SUMMARY:  Fall risk precautions. Medical bed in room. 1:1 continues. Non-weight bearing on right leg. CAM boot when OOB. Gait belt and SBA when transferring self to wheelchair. Ice pack inside a pillowcase, right leg elevated, PRNs offered as appropriate. Reports constipation; Passing flatus. BS WNL. Encouraged fluids and sitting upright in wheelchair. Bed wedge in use. Ate supper in the lounge. Right foot intermittently elevated and ice packs applied PRN for 20 minutes at a time. Pedal pulse palpable, foot and toes warm, movement and sensation intact. Denied shoulder pain. Calm and cooperative. Free of falls.     Problem: Adult Behavioral Health Plan of Care  Goal: Patient-Specific Goal (Individualization)  Description: Pt. Will follow recommendations of treatment team during hospital stay.   Pt. Will sleep 6-8 hours a night during hospital stay.  Pt. Will maintain ADLs without prompting during hospital stay.  Pt. Will be free from self harm during hospital stay.   Pt. Will attend > 50% of unit programing during hospital stay.     Pt 1-1 staffing for risk for falls.   Pt is to not have weight on his right foot and must wear the boot when out of bed  Pt to use cold packs on his foot for 20 minutes at a time elevated. Try to do this every other 1 hour when awake  Pt must use a wheel chair when out of bed  Pt will see podiatry on Monday  Pt to have 2-1 staffing to shower in the hallway shower  Pt is to elevate his foot in bed with a bed wedge  Pt must have an Ortho appointment before discharge    Pt needs a skin check of the foot with boot on once per shift  Outcome: Progressing     Problem: Psychotic Signs/Symptoms  Goal: Improved Behavioral Control (Psychotic Signs/Symptoms)  Description: Patient will identify 2 healthy coping skills for when anxious.  Outcome: Progressing     Problem: Suicidal Behavior  Goal: Suicidal Behavior is Absent or Managed  Outcome: Progressing     Problem: Fall Injury Risk  Goal:  Absence of Fall and Fall-Related Injury  Description: Patient will use tap bell at bedside for assistance.  Patient will wear non skid footwear.  Outcome: Progressing     Problem: Adult Behavioral Health Plan of Care  Goal: Plan of Care Review  Recent Flowsheet Documentation  Taken 5/7/2023 1555 by Donald Cherry RN  Patient Agreement with Plan of Care: agrees  Goal: Adheres to Safety Considerations for Self and Others  Intervention: Develop and Maintain Individualized Safety Plan  Recent Flowsheet Documentation  Taken 5/7/2023 1555 by Donald Cherry RN  Safety Measures:    environmental rounds completed    safety rounds completed    suicide check-in completed  Goal: Absence of New-Onset Illness or Injury  Intervention: Identify and Manage Fall Risk  Recent Flowsheet Documentation  Taken 5/7/2023 1555 by Donald Cherry RN  Safety Measures:    environmental rounds completed    safety rounds completed    suicide check-in completed  Goal: Develops/Participates in Therapeutic Orgas to Support Successful Transition  Intervention: Foster Therapeutic Orgas  Recent Flowsheet Documentation  Taken 5/7/2023 1555 by Donald Cherry RN  Trust Relationship/Rapport:    care explained    thoughts/feelings acknowledged    reassurance provided    questions encouraged    questions answered    empathic listening provided    emotional support provided    choices provided     Problem: Psychotic Signs/Symptoms  Goal: Optimal Cognitive Function (Psychotic Signs/Symptoms)  Intervention: Support and Promote Cognitive Ability  Recent Flowsheet Documentation  Taken 5/7/2023 1555 by Donald Cherry RN  Trust Relationship/Rapport:    care explained    thoughts/feelings acknowledged    reassurance provided    questions encouraged    questions answered    empathic listening provided    emotional support provided    choices provided  Goal: Enhanced Social, Occupational or Functional Skills (Psychotic Signs/Symptoms)  Intervention: Promote  Social, Occupational and Functional Ability  Recent Flowsheet Documentation  Taken 5/7/2023 1555 by Donald Cherry, RN  Trust Relationship/Rapport:    care explained    thoughts/feelings acknowledged    reassurance provided    questions encouraged    questions answered    empathic listening provided    emotional support provided    choices provided   Goal Outcome Evaluation:    Plan of Care Reviewed With: patient

## 2023-05-08 LAB — DEPRECATED CALCIDIOL+CALCIFEROL SERPL-MC: 16 UG/L (ref 20–75)

## 2023-05-08 PROCEDURE — 250N000013 HC RX MED GY IP 250 OP 250 PS 637: Performed by: NURSE PRACTITIONER

## 2023-05-08 PROCEDURE — 99232 SBSQ HOSP IP/OBS MODERATE 35: CPT | Mod: GT | Performed by: PSYCHIATRY & NEUROLOGY

## 2023-05-08 PROCEDURE — 99222 1ST HOSP IP/OBS MODERATE 55: CPT | Performed by: PODIATRIST

## 2023-05-08 PROCEDURE — 250N000013 HC RX MED GY IP 250 OP 250 PS 637: Performed by: PSYCHIATRY & NEUROLOGY

## 2023-05-08 PROCEDURE — 124N000001 HC R&B MH

## 2023-05-08 RX ADMIN — IBUPROFEN 600 MG: 600 TABLET ORAL at 22:02

## 2023-05-08 RX ADMIN — QUETIAPINE FUMARATE 200 MG: 200 TABLET ORAL at 20:24

## 2023-05-08 RX ADMIN — IBUPROFEN 600 MG: 600 TABLET ORAL at 08:06

## 2023-05-08 RX ADMIN — HYDROXYZINE HYDROCHLORIDE 25 MG: 25 TABLET, FILM COATED ORAL at 22:01

## 2023-05-08 RX ADMIN — MELATONIN 5 MG TABLET 5 MG: at 20:24

## 2023-05-08 RX ADMIN — CALCIUM CARBONATE 600 MG (1,500 MG)-VITAMIN D3 400 UNIT TABLET 1 TABLET: at 16:17

## 2023-05-08 RX ADMIN — CALCIUM CARBONATE 600 MG (1,500 MG)-VITAMIN D3 400 UNIT TABLET 1 TABLET: at 08:06

## 2023-05-08 RX ADMIN — POLYETHYLENE GLYCOL 3350 17 G: 17 POWDER, FOR SOLUTION ORAL at 08:06

## 2023-05-08 RX ADMIN — ACETAMINOPHEN 325MG 650 MG: 325 TABLET ORAL at 22:02

## 2023-05-08 RX ADMIN — ACETAMINOPHEN 325MG 650 MG: 325 TABLET ORAL at 10:43

## 2023-05-08 ASSESSMENT — ACTIVITIES OF DAILY LIVING (ADL)
ORAL_HYGIENE: INDEPENDENT
ADLS_ACUITY_SCORE: 42
ADLS_ACUITY_SCORE: 32
ADLS_ACUITY_SCORE: 42
DRESS: SCRUBS (BEHAVIORAL HEALTH)
ADLS_ACUITY_SCORE: 32
HYGIENE/GROOMING: WITH ASSISTANCE
ADLS_ACUITY_SCORE: 42
ADLS_ACUITY_SCORE: 42
HYGIENE/GROOMING: INDEPENDENT
LAUNDRY: UNABLE TO COMPLETE
ADLS_ACUITY_SCORE: 42
ORAL_HYGIENE: INDEPENDENT
ADLS_ACUITY_SCORE: 42

## 2023-05-08 NOTE — PROGRESS NOTES
Podiatry Consult Note      Chief complaint: RIGHT foot fracture     History of Present Illness: This 33 year old male is seen at bedside for evaluation and suggestions of management of a RIGHT foot fracture.     Patient says that on 05/05/2023, he took two medications (Abilify and Seroquel) at the same time that he thinks made him feel dizzy. He was also feeling anxious and he had difficulty sleeping, so he stood up to go ask for medication at the nurse's station. Upon standing, he became dizzy. He tried to grab a table for balance, but he says he blacked out and his foot must have twisted as he fell. When he was more conscious, he says he did not feel a lot of pain, but he had pain in the RIGHT midfoot when he tried to apply pressure on his foot. He had radiographs ordered which revealed a proximal second metatarsal fracture.     Patient has been wearing a CAM boot. He has a wheelchair in his room and he says he has not been applying weight on his foot. He has no pain in the foot at bedside. He has a wedge beneath his RIGHT leg to elevate the foot. He says he has also been icing his foot throughout the day with an ice pack that has a covering on it.    No further pedal complaints today.       /90   Pulse 100   Temp 97.8  F (36.6  C) (Tympanic)   Resp 16   Ht 1.829 m (6')   Wt (!) 170.1 kg (375 lb 1.6 oz)   SpO2 94%   BMI 50.87 kg/m      Patient Active Problem List   Diagnosis     Depression, unspecified depression type     Psychosis (H)       No past surgical history on file.    Current Facility-Administered Medications   Medication     acetaminophen (TYLENOL) tablet 650 mg     albuterol (PROVENTIL HFA/VENTOLIN HFA) inhaler     alum & mag hydroxide-simethicone (MAALOX) suspension 30 mL     artificial saliva (BIOTENE MT) solution 2 spray     calcium carbonate-vitamin D (CALTRATE) 600-10 MG-MCG per tablet 1 tablet     diclofenac (VOLTAREN) 1 % topical gel 4 g     hydrOXYzine (ATARAX) tablet 25 mg      ibuprofen (ADVIL/MOTRIN) tablet 600 mg     melatonin tablet 5 mg     naloxone (NARCAN) injection 0.2 mg    Or     naloxone (NARCAN) injection 0.4 mg    Or     naloxone (NARCAN) injection 0.2 mg    Or     naloxone (NARCAN) injection 0.4 mg     OLANZapine (zyPREXA) tablet 10 mg    Or     OLANZapine (zyPREXA) injection 10 mg     ondansetron (ZOFRAN ODT) ODT tab 4 mg     polyethylene glycol (MIRALAX) Packet 17 g     QUEtiapine (SEROquel) tablet 200 mg     QUEtiapine (SEROquel) tablet 25 mg        No Known Allergies    No family history on file.    Social History     Socioeconomic History     Marital status: Single       ROS: 10 point ROS neg other than the symptoms noted above in the HPI.  EXAM  Constitutional: healthy, alert and no distress    Psychiatric: mentation appears normal and affect normal/bright    VASCULAR:  -Dorsalis pedis pulse +1/4 bilaterally   -Posterior tibial pulse weakly palpable through edema on RIGHT and +1/4 on LEFT   -Capillary refill time < 3 seconds to b/l hallux  NEURO:  -Light touch sensation intact to b/l plantar forefoot  DERM:  -Skin temperature, texture and turgor WNL b/l  -Mild ecchymosis to the base of the second toe and hallux  MSK:  -Minimal tenderness on palpation to the RIGHT dorsal second metatarsal base  -Muscle strength of ankles +5/5 for dorsiflexion, plantarflexion, ABDUction and ADDuction b/l  ============================================================    ASSESSMENT:  RIGHT foot metatarsal fracture of the RIGHT proximal second metatarsal, non-displaced      PLAN:  -Patient evaluated and examined. Treatment options discussed with no educational barriers noted.  -Metatarsal fracture(s):    ---Discussed metatarsalfractures and healing -- patient has minimal  displacement of the bone and may heal uneventfully after six weeks post injury. The parabola of the metatarsal heads is still in alignment and maintained. It will take additional offloading to provide the best chance of  "healing at the base of the second metatarsal.  ---Patient is advised to fully offload the foot for at least six weeks to prevent the metatarsal fracture from shifting or further displacing.  ---Patient has a long leg CAM boot and wheelchair  ---Advised to wear the CAM boot at all times when he is not in bed. He is advised to offload the foot with crutches or a wheelchair.   ---Patient will need repeat radiographs and follow-up with podiatry or orthopaedics in 4-5 weeks. When is ready to transition back to walking, he may benefit from an orthotic. He may develop arthritis near the fracture site at the joint of the 2nd TMTJ.     -Additional instructions:  ---Elevate foot as much as possible  ---Mildly compress the foot and leg for edema control when possible with a 4\" ACE wrap   ---Ice painful foot daily    -The above information was passed onto a nurse on the floor who is familiar with the patient's plan of care. Also sent a staff message to patient's NP, Sherrie Parra, with the above information.    -Patient in agreement with the above treatment plan and all of patient's questions were answered.      Please re-consult podiatry as needed if patient plans on keeping his care in the local area after discharge or if he has any other concerns. Podiatry will not need to continue with follow-up care at this time. Please provide patient with podiatry or orthopaedic follow-up care if he returns to the [facility].        Jojo Davila DPM  "

## 2023-05-08 NOTE — PLAN OF CARE
"  Problem: Adult Behavioral Health Plan of Care  Goal: Patient-Specific Goal (Individualization)  Description: Pt. Will follow recommendations of treatment team during hospital stay.   Pt. Will sleep 6-8 hours a night during hospital stay.  Pt. Will maintain ADLs without prompting during hospital stay.  Pt. Will be free from self harm during hospital stay.   Pt. Will attend > 50% of unit programing during hospital stay.     Pt 1-1 staffing for risk for falls.   Pt is to not have weight on his right foot and must wear the boot when out of bed  Pt to use cold packs on his foot for 20 minutes at a time elevated. Try to do this every other 1 hour when awake  Pt must use a wheel chair when out of bed  Pt will see podiatry on Monday  Pt to have 2-1 staffing to shower in the hallway shower  Pt is to elevate his foot in bed with a bed wedge  Pt must have an Ortho appointment before discharge    Pt needs a skin check of the foot with boot on once per shift    Patient has 1:1 staff with him at all times, he uses his wheelchair and tap bell, and has a walking boot on his right foot. He is pleasant, polite, and cooperative. Affect is full range, mood is calm. He denies SI, HI, anxiety, depression, and hallucinations, states \"I feel pretty good\". He admits to pain in his right foot, he received Ibuprofen 600 mg at 0806, and Tylenol 650 mg and an ice pack at 1043. States \"I plan to go to a group or two today, I need to get up and be moving\". He spends time in his bed with his foot elevated. He is wondering what time Orthopedics will see him today.   1230-patient complaining of constipation, asking for more Miralax. He was offered prune juice, he drank that, and was given more ice water. He was encouraged to stay well hydrated, he is in agreement.   Face to face end of shift report communicated to oncoming RN.     Nella Vences RN  5/8/2023  2:44 PM    Outcome: Progressing     Problem: Psychotic Signs/Symptoms  Goal: " Improved Behavioral Control (Psychotic Signs/Symptoms)  Description: Patient will identify 2 healthy coping skills for when anxious.  Outcome: Progressing     Problem: Suicidal Behavior  Goal: Suicidal Behavior is Absent or Managed  Outcome: Progressing     Problem: Fall Injury Risk  Goal: Absence of Fall and Fall-Related Injury  Description: Patient will use tap bell at bedside for assistance.  Patient will wear non skid footwear.  Outcome: Progressing   Goal Outcome Evaluation:    Plan of Care Reviewed With: patient

## 2023-05-08 NOTE — PLAN OF CARE
Face to face end of shift report received from MILENA Bennett. Rounding completed. Patient observed resting in bed with eyes closed and regular respirations. 1:1 staff present for pt safety.     Josephine Del Angel RN  5/8/2023  12:31 AM

## 2023-05-08 NOTE — PROGRESS NOTES
"  [facility] PSYCHIATRY  -  PROGRESS NOTE     ID   Name: Connor Soares  MRN#: [id number]     SUBJECTIVE   Prior to interviewing the patient, I met with nursing and reviewed patient's clinical condition. We discussed clinical care both before and after the interview. I have reviewed the patient's clinical course by review of records including previous notes, labs, and vital signs.     Per nursing, the patient had the following behavioral events over the last 24-hours: not attending groups.     On psychiatric interview, he is met within his room. He states he did not attend any groups yesterday. Reports he again did not get a hold of mom again, reports mother is at [employer].  He is aware that orthopedics is meeting with him today.  He states he will go to groups today. Denies SI.         MEDICATIONS   Scheduled Meds:    calcium carbonate-vitamin D  1 tablet Oral BID w/meals     polyethylene glycol  17 g Oral Daily     QUEtiapine  200 mg Oral At Bedtime     PRN Meds:.acetaminophen, albuterol, alum & mag hydroxide-simethicone, artificial saliva, diclofenac, hydrOXYzine, ibuprofen, melatonin, naloxone **OR** naloxone **OR** naloxone **OR** naloxone, OLANZapine **OR** OLANZapine, ondansetron, oxyCODONE, QUEtiapine     ALLERGIES   No Known Allergies     VITALS   Vitals: /91 (BP Location: Right arm)   Pulse 92   Temp 97.9  F (36.6  C) (Tympanic)   Resp 14   Ht 1.829 m (6')   Wt (!) 170.1 kg (375 lb 1.6 oz)   SpO2 94%   BMI 50.87 kg/m       MENTAL STATUS EXAM   Appearance:  awake, alert, adequately groomed, dressed in hospital scrubs, and appeared as age stated  Attitude:  cooperative  Eye Contact:  good  Mood:  \"alright\"  Affect:  mood congruent  Speech:  clear, coherent  Psychomotor Behavior:  no evidence of tardive dyskinesia, dystonia, or tics  Thought Process:  logical, linear, and goal oriented  Associations:  no loose associations  Thought Content:  no evidence of suicidal ideation or homicidal " ideation  Insight:  limited  Judgment:  limited  Oriented to:  time, person, and place  Attention Span and Concentration:  intact  Recent and Remote Memory:  intact  Gait and Station: Normal       LABS   No results found for this or any previous visit (from the past 24 hour(s)).      ASSESSMENT   Connor Soares is a 33 year old male with a past psychiatric history notable for MDD with psychotic features versus schizoaffective disorder and autism spectrum disorder. Multiple prior inpatient psychiatric hospitalizations. Medication nonadherence.  Presented to outside emergency department accompanied by law enforcement after being found disorganized in the community attempting to search for his dog that he lost over 7 years ago. He was subsequently sent to [facility] unit and started on Seroquel 50 mg at bedtime. Patient was subsequently transferred and accepted for inpatient psychiatric hospitalization at [facility] for further safety and further stabilization     Daily Progress: Ortho consulted and will plan to see patient on Monday, 5/8/23       DIAGNOSTIC FORMULATION   #major depressive disorder with psychotic features  #autism spectrum disorder  #medication nonadherence     PLAN     Location: Unit 5  Legal Status: Orders Placed This Encounter      Legal status 72 Hour Hold    Safety Assessment:    Behavioral Orders   Procedures     Code 1 - Restrict to Unit     Routine Programming     As clinically indicated     Status 15     Every 15 minutes.     Status Individual Observation     Patient SIO status reviewed with team/RN.  Please also refer to RN/team documentation for add'l detail.    -SIO staff to monitor following which have contributed to patient being on SIO:  Syncope, broken right foot, and generalized weakness  -Possible interventions SIO staff could use to support patient's treatment progress:  Gait belt, boot on when up, no weight bearing right foot, wheel  chair, and use hallway shower 2-1 staffi ng  -When following observed, team will review discontinuation of SIO:  N/A     Order Specific Question:   CONTINUOUS 24 hours / day     Answer:   5 feet     Order Specific Question:   Indications for SIO     Answer:   Self-injury risk          PTA medications continued/changed:   -none    New medications tried and stopped:   -None    New medications initiated:   -seroquel --> increased to 200 mg at bedtime     Today's Changes:  -discontinue abilify   -maintain seroquel at 200 mg at bedtime     Programming: Patient will be treated in a therapeutic milieu with appropriate individual and group therapies. Education will be provided on diagnoses, medications, and treatments. Encouraged behavioral activation and participation in group programming.     Medical diagnoses:  Per medicine    #Oblique Fracture through base of the second metatarsal  -pain medication ordered  -ortho to seetoday,  05/8/23    Disposition: pending clinical course , home with services versus IRTS       TREATMENT TEAM CARE PLAN     Progress: Continued symptoms.    Continued Stay Criteria/Rationale: Continued symptoms without sufficient improvement/resolution.    Medical/Physical: See above.    Precautions: See above.     Plan: Continue inpatient care with unit support and medication management.    Rationale for change in precautions or plan: NA due to no change.    Participants: Andrew Monteiro MD, Nursing, SW, OT.    The patient's care was discussed with the treatment team and chart notes were reviewed.       ATTESTATION    Andrew Monteiro MD  [facility]   Psychiatry    Video Visit: Patient has given verbal consent for video visit?: Yes  Type of Service: video visit for mental health treatment  Reason for Video Visit: COVID-19 [place]  Originating Site (patient location): [facility]  Distant Site (provider location): Remote Location  Mode of Communication: Video  Conference via [employer]  Time of Service: Date: 05/08/2023 , Start: 08:30 end: 09:00

## 2023-05-08 NOTE — PROGRESS NOTES
1:1 time with the patient.  No changes made to the discharge plan at this time.   See the AVS for follow up appointments and recommendations.     The SW attempted to speak to the patient. The patient was sleeping.      KELLY left a phone message for the patient's  Yeng asking for her to call KELLY back.

## 2023-05-08 NOTE — CARE PLAN
Face to face end of shift report received from Josephine Del Angel RN. Rounding completed. Patient observed resting in bed with eyes closed and regular respirations. 1:1 staff present for pt safety. Pt appears to have slept approximately 8 hours tonight.

## 2023-05-09 PROCEDURE — 250N000013 HC RX MED GY IP 250 OP 250 PS 637: Performed by: PSYCHIATRY & NEUROLOGY

## 2023-05-09 PROCEDURE — 99232 SBSQ HOSP IP/OBS MODERATE 35: CPT | Mod: GT | Performed by: PSYCHIATRY & NEUROLOGY

## 2023-05-09 PROCEDURE — 99233 SBSQ HOSP IP/OBS HIGH 50: CPT | Performed by: NURSE PRACTITIONER

## 2023-05-09 PROCEDURE — 124N000001 HC R&B MH

## 2023-05-09 PROCEDURE — 250N000013 HC RX MED GY IP 250 OP 250 PS 637: Performed by: NURSE PRACTITIONER

## 2023-05-09 RX ORDER — AMOXICILLIN 250 MG
2 CAPSULE ORAL DAILY PRN
Status: DISCONTINUED | OUTPATIENT
Start: 2023-05-09 | End: 2023-05-16 | Stop reason: HOSPADM

## 2023-05-09 RX ORDER — ERGOCALCIFEROL 1.25 MG/1
50000 CAPSULE, LIQUID FILLED ORAL
Status: DISCONTINUED | OUTPATIENT
Start: 2023-05-09 | End: 2023-05-16 | Stop reason: HOSPADM

## 2023-05-09 RX ORDER — AMOXICILLIN 250 MG
2 CAPSULE ORAL 2 TIMES DAILY
Status: DISCONTINUED | OUTPATIENT
Start: 2023-05-09 | End: 2023-05-16 | Stop reason: HOSPADM

## 2023-05-09 RX ADMIN — MAGNESIUM HYDROXIDE 30 ML: 400 SUSPENSION ORAL at 08:31

## 2023-05-09 RX ADMIN — QUETIAPINE FUMARATE 250 MG: 200 TABLET ORAL at 21:01

## 2023-05-09 RX ADMIN — POLYETHYLENE GLYCOL 3350 17 G: 17 POWDER, FOR SOLUTION ORAL at 08:09

## 2023-05-09 RX ADMIN — CALCIUM CARBONATE 600 MG (1,500 MG)-VITAMIN D3 400 UNIT TABLET 1 TABLET: at 08:09

## 2023-05-09 RX ADMIN — ERGOCALCIFEROL 50000 UNITS: 1.25 CAPSULE, LIQUID FILLED ORAL at 11:29

## 2023-05-09 RX ADMIN — MELATONIN 5 MG TABLET 5 MG: at 21:01

## 2023-05-09 RX ADMIN — SENNOSIDES AND DOCUSATE SODIUM 2 TABLET: 8.6; 5 TABLET ORAL at 21:01

## 2023-05-09 RX ADMIN — CALCIUM CARBONATE 600 MG (1,500 MG)-VITAMIN D3 400 UNIT TABLET 1 TABLET: at 16:27

## 2023-05-09 ASSESSMENT — ACTIVITIES OF DAILY LIVING (ADL)
ADLS_ACUITY_SCORE: 32
ADLS_ACUITY_SCORE: 32
ORAL_HYGIENE: INDEPENDENT
ADLS_ACUITY_SCORE: 32
DRESS: SCRUBS (BEHAVIORAL HEALTH);INDEPENDENT
LAUNDRY: UNABLE TO COMPLETE
ADLS_ACUITY_SCORE: 32
HYGIENE/GROOMING: INDEPENDENT
HYGIENE/GROOMING: INDEPENDENT
ORAL_HYGIENE: INDEPENDENT
ADLS_ACUITY_SCORE: 32
DRESS: SCRUBS (BEHAVIORAL HEALTH)
ADLS_ACUITY_SCORE: 32

## 2023-05-09 NOTE — PROGRESS NOTES
"[facility]    Medical Services Progress Note    Date of Service (when I saw the patient): 05/09/2023      Assessment & Plan     Principal Problem:    Psychosis (H)    Active Medical Problems:  Morbid obesity- 170.1 kg. BMI 50.87. Reports he was started on ozempic and only took it 2 weeks before stopping due to side effects. Last A1c in August 5.6.  Glucose in the ED wnl  at 89. A1c today is 5.7 which does put him in the prediabetic range.     Prediabetes- a1c 5.7. Discussed with him about starting metformin. We discussed side effects of the medication as well as the risks and benefits of starting it. Discussed weight loss, exercise and diet. Foods to avoid or limit such as breads, sugars, pasta.  He is not interested in starting it  and states \"I will pass on the metformin, I will try and lose weight.\"    Exercise induced asthma- reports he last used his albuterol inhaler 6 months ago. Denies chest pain, sob. Albuterol inhaler as needed.   5/5- denies chest pain, sob, difficulty breathing.   5/9- denies chest pain, sob, difficulty breathing.     High fall risk- pt reports he was in bed and agitated, anxious and tense and got up to go to the nurses station to ask for medication and after he got to the nurses station he had a \"dizzy spell\" and grabbed the table and fell backwards on his back. He denies any back pain, hip pain, denies hitting his head. Denies loss of consciousness. He reports he twisted his right foot and now can't bear weight on it. There is so edema noted, no erythema noted. He has limited range of motion. Pt believes his Abilify and seroquel made him dizzy.  Pt was provided a tap bell, wheelchair and room moved near the nurses station. OT consult.   5/9- no reported falls. Pt on 1:1 for safety.     Right foot pain- see note above. Xray ordered.  FINDINGS:  There is an oblique fracture through the base of the second metatarsal. The remainder of the foot is intact. The articular spaces are " "normal in height. IMPRESSION: Second metatarsal fracture proximally.    Consulted with Dr. Avina and Dr. Davila. Dr. Davila reviewed xrays and recommended a long CAM boot, and fully offload with crutches, knee scooter, wheelchair. He is currently in a wheelchair and CAM boot given to nurse with instructions, skin and foot to be assessed q shift. Care plan to be updated.   He will need follow up appointment set up with podiatry or ortho at discharge. Dr. Davila will see the pt on the unit on Monday. Ice as needed. Tylenol, ibuprofen, diclofeneac as needed. Oxycodone as needed for 3 days. Can transition to tramadol if needed. Vitamin D level ordered. Will start Caltrate for bone healing. Pt placed on 1:1 for safety. Discussed plan with pt and he is appreciative.   5/9- Dr. Davila seen pt yesterday. Per podiatry ---Patient is advised to fully offload the foot for at least six weeks to prevent the metatarsal fracture from shifting or further displacing.  ---Patient has a long leg CAM boot and wheelchair  ---Advised to wear the CAM boot at all times when he is not in bed. He is advised to offload the foot with crutches or a wheelchair.   ---Patient will need repeat radiographs and follow-up with podiatry or orthopaedics in 4-5 weeks. When is ready to transition back to walking, he may benefit from an orthotic. He may develop arthritis near the fracture site at the joint of the 2nd TMTJ.   -Additional instructions:  ---Elevate foot as much as possible  ---Mildly compress the foot and leg for edema control when possible with a 4\" ACE wrap   ---Ice painful foot daily  Pt reports he has an elevator at his apartment that he uses to get to the basement. He states as long as the elevator is working he does not have to utilize any stairs. He reports his pain is manageable with tylenol and ibuprofen.        Xerostomia- reports he is drinking plenty of water but feels his mouth is dry. He reports he always has issues with dry " mouth. Biotene ordered.     Constipation- complained of constipation for the past 2 days. He was given miralax, milk of magnesium and prune juice and reports he had a large bowel movement this morning. Denies abdominal pain. And states he would only like senna as needed.          Code Status: Full Code.    Sherrie Parra CNP        -Data reviewed today: I reviewed all new labs and imaging results over the last 24 hours.     Physical Exam   Temp: 98.9  F (37.2  C) Temp src: Tympanic BP: 120/92 Pulse: 117   Resp: 18 SpO2: 95 % O2 Device: None (Room air)    Vitals:    04/30/23 1500   Weight: (!) 170.1 kg (375 lb 1.6 oz)     Vital Signs with Ranges  Temp:  [97.8  F (36.6  C)-98.9  F (37.2  C)] 98.9  F (37.2  C)  Pulse:  [] 117  Resp:  [16-18] 18  BP: (120-149)/(74-92) 120/92  SpO2:  [94 %-97 %] 95 %  No intake/output data recorded.    Constitutional: awake, alert, cooperative, no apparent distress, and appears stated age. Vitals stable   Respiratory: No increased work of breathing, good air exchange, clear to auscultation bilaterally, no crackles or wheezing  Cardiovascular: Normal apical impulse, regular rate and rhythm, normal S1 and S2, no S3 or S4, and no murmur noted  Skin: CAM boot, right foot 1+ edema, no bruising, otherwise normal skin color, texture, turgor  Musculoskeletal: There is no redness, warmth. 1+ edema to the right foot.   Limited range of motion noted to right foot.    Neurologic: Awake, alert, oriented to name, place and time.    Neuropsychiatric: General: normal, calm and normal eye contact    Medications     calcium carbonate-vitamin D  1 tablet Oral BID w/meals     polyethylene glycol  17 g Oral Daily     QUEtiapine  250 mg Oral At Bedtime     senna-docusate  2 tablet Oral BID     vitamin D2  50,000 Units Oral Q7 Days       Data   No lab results found in last 7 days.    No results found for this or any previous visit (from the past 24 hour(s)).

## 2023-05-09 NOTE — PLAN OF CARE
Problem: Adult Behavioral Health Plan of Care  Goal: Patient-Specific Goal (Individualization)  Description: Pt. Will follow recommendations of treatment team during hospital stay.   Pt. Will sleep 6-8 hours a night during hospital stay.  Pt. Will maintain ADLs without prompting during hospital stay.  Pt. Will be free from self harm during hospital stay.   Pt. Will attend > 50% of unit programing during hospital stay.     Pt 1-1 staffing for risk for falls.   Pt is to not have weight on his right foot and must wear the boot when out of bed  Pt to use cold packs on his foot for 20 minutes at a time elevated. Try to do this every other 1 hour when awake  Pt must use a wheel chair when out of bed  Pt will see podiatry on Monday  Pt to have 2-1 staffing to shower in the hallway shower  Pt is to elevate his foot in bed with a bed wedge  Pt must have an Ortho appointment before discharge    Pt needs a skin check of the foot with boot on once per shift  Outcome: Progressing  Note:     1730 Patient had been resting in bed with 1-1 at his side. Patient is animated, smiling. States he is doing fairly well today but admits he didn't sleep very well the previous night. Patient encouraged to attend groups and patient agreeable stating that he promised he would go to two per day but hasn't gone to any at this time. Patient ate well for supper meal. Patient using wh/ch well and is mostly independent with transfers with just sideby assist. Tells writer that pain in his foot is at a 3-4 and hasn't taken tylenol or ibuprofen as of yet. Patient did earlier use ice packs for pain. Wears ortho boot and is able to put this on on his own.    2100 Patient has taken HS medications, discussed with patient coping skills such as grounding when repeative negative thoughts are happening, patient practiced with writer. Patient rates foot pain at a 3-4 and declined offer of Ibuprofen and tylenol. Patient Requested and given Melatonin 3 mg po for  sleep. Talked about medications and dose adjustments. Ice applied to right foot and is elevated for comfort.    Face to face end of shift report communicated to 11-7 shift RN.     Josephine Rey RN  5/9/2023  9:45 PM          Problem: Suicidal Behavior  Goal: Suicidal Behavior is Absent or Managed  Outcome: Progressing     Problem: Fall Injury Risk  Goal: Absence of Fall and Fall-Related Injury  Description: Patient will use tap bell at bedside for assistance.  Patient will wear non skid footwear.  Outcome: Progressing      Goal Outcome Evaluation:    Plan of Care Reviewed With: patient

## 2023-05-09 NOTE — PLAN OF CARE
Problem: Adult Behavioral Health Plan of Care  Goal: Patient-Specific Goal (Individualization)  Description: Pt. Will follow recommendations of treatment team during hospital stay.   Pt. Will sleep 6-8 hours a night during hospital stay.  Pt. Will maintain ADLs without prompting during hospital stay.  Pt. Will be free from self harm during hospital stay.   Pt. Will attend > 50% of unit programing during hospital stay.     Pt 1-1 staffing for risk for falls.   Pt is to not have weight on his right foot and must wear the boot when out of bed  Pt to use cold packs on his foot for 20 minutes at a time elevated. Try to do this every other 1 hour when awake  Pt must use a wheel chair when out of bed  Pt will see podiatry on Monday  Pt to have 2-1 staffing to shower in the hallway shower  Pt is to elevate his foot in bed with a bed wedge  Pt must have an Ortho appointment before discharge    Pt needs a skin check of the foot with boot on once per shift  Outcome: Progressing  Note:     Problem: Suicidal Behavior  Goal: Suicidal Behavior is Absent or Managed  Outcome: Progressing     Problem: Fall Injury Risk  Goal: Absence of Fall and Fall-Related Injury  Description: Patient will use tap bell at bedside for assistance.  Patient will wear non skid footwear.  Outcome: Progressing   1700 Patient sat up in the lounge area and played cards with peers. Denies current pain to foot. Pleasant and social with 1-1 at patient side.    1730 Patient complaining of constipation and asking for additional medication for this. Bowel sounds auscultated and are present throughout abdomen. Patient encouraged to drink water. Patient currently unsure if he is able to bear weight on his broken foot but states he did see an ortho doctor today. Patient is pleasant, admits to on and off depression and is resting in bed at this time.    2145 Patient is in bed with eyes closed and regular resps. Earlier patient had requested and was given a journal  and pt did some writing.    2200 Patient requested and given Tylenol 650 mg po and Ibuprofen 600 mg po for complaints of arm and foot pain rated a 7 on 0/10 scale. Also requested and given Hydroxyzine 25 mg po for anxiety.    Face to face end of shift report communicated to 11-7 shift RN.     Josephine Rey RN  5/8/2023  9:45 PM         Goal Outcome Evaluation:    Plan of Care Reviewed With: patient

## 2023-05-09 NOTE — PLAN OF CARE
"  Problem: Adult Behavioral Health Plan of Care  Goal: Patient-Specific Goal (Individualization)  Description: Pt. Will follow recommendations of treatment team during hospital stay.   Pt. Will sleep 6-8 hours a night during hospital stay.  Pt. Will maintain ADLs without prompting during hospital stay.  Pt. Will be free from self harm during hospital stay.   Pt. Will attend > 50% of unit programing during hospital stay.     Pt 1-1 staffing for risk for falls.   Pt is to not have weight on his right foot and must wear the boot when out of bed  Pt to use cold packs on his foot for 20 minutes at a time elevated. Try to do this every other 1 hour when awake  Pt must use a wheel chair when out of bed  Pt will see podiatry on Monday  Pt to have 2-1 staffing to shower in the hallway shower  Pt is to elevate his foot in bed with a bed wedge  Pt must have an Ortho appointment before discharge    Pt needs a skin check of the foot with boot on once per shift    Patient is pleasant, polite, and cooperative. Affect is full range, mood is calm. He admits to depression, states \"I'm just feeling a bit depressed today\". States his foot feels good today, he denies pain, states he will ice it. States he is eating and sleeping okay, but has not had a bowel movement yet, he was encouraged to stay well hydrated. States he plans to attend some groups today, and would like help to shower. Has been appropriate with staff and peers.   Patient reports \"a good\" BM.  Patient showered with 2 staff present in the shower room before lunch.   Patient elevated and applied ice to right foot after lunch, states he has minimal pain.  Face to face end of shift report communicated to oncoming MILENA.     Nella Vences RN  5/9/2023  2:21 PM    Outcome: Progressing     Problem: Psychotic Signs/Symptoms  Goal: Improved Behavioral Control (Psychotic Signs/Symptoms)  Description: Patient will identify 2 healthy coping skills for when anxious.  Outcome: " Progressing     Problem: Suicidal Behavior  Goal: Suicidal Behavior is Absent or Managed  Outcome: Progressing     Problem: Fall Injury Risk  Goal: Absence of Fall and Fall-Related Injury  Description: Patient will use tap bell at bedside for assistance.  Patient will wear non skid footwear.  Outcome: Progressing   Goal Outcome Evaluation:    Plan of Care Reviewed With: patient

## 2023-05-09 NOTE — PROGRESS NOTES
"  [facility] PSYCHIATRY  -  PROGRESS NOTE     ID   Name: Connor Soares  MRN#: [id number]     SUBJECTIVE   Prior to interviewing the patient, I met with nursing and reviewed patient's clinical condition. We discussed clinical care both before and after the interview. I have reviewed the patient's clinical course by review of records including previous notes, labs, and vital signs.     Per nursing, the patient had the following behavioral events over the last 24-hours: none, remains on 1:1, attending 1 group yesterday    On psychiatric interview, he is met within his room. He attended one group yesterday. Discussed goal to attend two groups today. Has not had a bowel movement in 4 days, likely related to opioids for foot received, receptive to both miralax and milk of magnesia. Reports mental health symptoms continue to stabilize. Denies AVH.  Reports he is having less paranoia. Is more opening talking about his struggles at home and what supports he deems necessary for his success.  Reports mother is going through own mental health challenges and is in a facility at the present moment.        MEDICATIONS   Scheduled Meds:    calcium carbonate-vitamin D  1 tablet Oral BID w/meals     polyethylene glycol  17 g Oral Daily     QUEtiapine  250 mg Oral At Bedtime     PRN Meds:.acetaminophen, albuterol, alum & mag hydroxide-simethicone, artificial saliva, diclofenac, hydrOXYzine, ibuprofen, melatonin, naloxone **OR** naloxone **OR** naloxone **OR** naloxone, OLANZapine **OR** OLANZapine, ondansetron, QUEtiapine     ALLERGIES   No Known Allergies     VITALS   Vitals: /74   Pulse 91   Temp 98.1  F (36.7  C) (Tympanic)   Resp 18   Ht 1.829 m (6')   Wt (!) 170.1 kg (375 lb 1.6 oz)   SpO2 95%   BMI 50.87 kg/m       MENTAL STATUS EXAM   Appearance:  awake, alert, adequately groomed, dressed in hospital scrubs, and appeared as age stated  Attitude:  cooperative  Eye Contact:  good  Mood:  \"alright\"  Affect:  " mood congruent  Speech:  clear, coherent  Psychomotor Behavior:  no evidence of tardive dyskinesia, dystonia, or tics  Thought Process:  logical, linear, and goal oriented  Associations:  no loose associations  Thought Content:  no evidence of suicidal ideation or homicidal ideation  Insight:  limited  Judgment:  limited  Oriented to:  time, person, and place  Attention Span and Concentration:  intact  Recent and Remote Memory:  intact  Gait and Station: Normal       LABS   No results found for this or any previous visit (from the past 24 hour(s)).      ASSESSMENT   Connor Soares is a 33 year old male with a past psychiatric history notable for MDD with psychotic features versus schizoaffective disorder and autism spectrum disorder. Multiple prior inpatient psychiatric hospitalizations. Medication nonadherence.  Presented to outside emergency department accompanied by law enforcement after being found disorganized in the community attempting to search for his dog that he lost over 7 years ago. He was subsequently sent to [facility] unit and started on Seroquel 50 mg at bedtime. Patient was subsequently transferred and accepted for inpatient psychiatric hospitalization at [facility] for further safety and further stabilization     Daily Progress:Ortho examined on 05/09/23, no surgery indicated. Some continued constipation, will order milk of magnesia.      DIAGNOSTIC FORMULATION   #major depressive disorder with psychotic features  #autism spectrum disorder  #medication nonadherence     PLAN     Location: Unit 5  Legal Status: Orders Placed This Encounter      Legal status 72 Hour Hold    Safety Assessment:    Behavioral Orders   Procedures     Code 1 - Restrict to Unit     Routine Programming     As clinically indicated     Status 15     Every 15 minutes.     Status Individual Observation     Patient SIO status reviewed with team/RN.  Please also refer to RN/team  documentation for add'l detail.    -SIO staff to monitor following which have contributed to patient being on SIO:  Syncope, broken right foot, and generalized weakness  -Possible interventions SIO staff could use to support patient's treatment progress:  Gait belt, boot on when up, no weight bearing right foot, wheel chair, and use hallway shower 2-1 staffi ng  -When following observed, team will review discontinuation of SIO:  N/A     Order Specific Question:   CONTINUOUS 24 hours / day     Answer:   5 feet     Order Specific Question:   Indications for SIO     Answer:   Self-injury risk          PTA medications continued/changed:   -none    New medications tried and stopped:   -None    New medications initiated:   -seroquel --> increased to 200 mg at bedtime --250 mg (05/09/23)    Today's Changes:  -increase seroquel to 250 mg at bedtime   -milk of magnesia for constipation    Programming: Patient will be treated in a therapeutic milieu with appropriate individual and group therapies. Education will be provided on diagnoses, medications, and treatments. Encouraged behavioral activation and participation in group programming.     Medical diagnoses:  Per medicine    #Oblique Fracture through base of the second metatarsal  -pain medication ordered  -ortho to seetoday,  05/8/23    Disposition: pending clinical course , home with services versus IRTS       ATTESTATION    Andrew Monteiro MD  [facility]   Psychiatry    Video Visit: Patient has given verbal consent for video visit?: Yes  Type of Service: video visit for mental health treatment  Reason for Video Visit: COVID-19 [place]  Originating Site (patient location): [facility]  Distant Site (provider location): Remote Location  Mode of Communication: Video Conference via [email or web address]  Time of Service: Date: 05/09/2023 , Start: 08:30 end: 09:00

## 2023-05-09 NOTE — PLAN OF CARE
Problem: Adult Behavioral Health Plan of Care  Goal: Patient-Specific Goal (Individualization)  Description: Pt. Will follow recommendations of treatment team during hospital stay.   Pt. Will sleep 6-8 hours a night during hospital stay.  Pt. Will maintain ADLs without prompting during hospital stay.  Pt. Will be free from self harm during hospital stay.   Pt. Will attend > 50% of unit programing during hospital stay.     Pt 1-1 staffing for risk for falls.   Pt is to not have weight on his right foot and must wear the boot when out of bed  Pt to use cold packs on his foot for 20 minutes at a time elevated. Try to do this every other 1 hour when awake  Pt must use a wheel chair when out of bed  Pt will see podiatry on Monday  Pt to have 2-1 staffing to shower in the hallway shower  Pt is to elevate his foot in bed with a bed wedge  Pt must have an Ortho appointment before discharge    Pt needs a skin check of the foot with boot on once per shift  Outcome: Progressing     Problem: Fall Injury Risk  Goal: Absence of Fall and Fall-Related Injury  Description: Patient will use tap bell at bedside for assistance.  Patient will wear non skid footwear.  Outcome: Progressing   Goal Outcome Evaluation:       Face to face shift report received from RN. Rounding completed, pt observed. Client rested in room for 7 hours with eyes closed and respirations noted. Client had no falls this shift and remains 1:1 status at this time for safety.Face to face report will be communicated to oncoming RN.    Gary Tolliver RN  5/9/2023  6:12 AM

## 2023-05-10 ENCOUNTER — APPOINTMENT (OUTPATIENT)
Dept: PHYSICAL THERAPY | Facility: HOSPITAL | Age: 33
DRG: 885 | End: 2023-05-10
Attending: PSYCHIATRY & NEUROLOGY
Payer: MEDICARE

## 2023-05-10 PROCEDURE — 97530 THERAPEUTIC ACTIVITIES: CPT | Mod: GP | Performed by: PHYSICAL THERAPIST

## 2023-05-10 PROCEDURE — 250N000013 HC RX MED GY IP 250 OP 250 PS 637: Performed by: PSYCHIATRY & NEUROLOGY

## 2023-05-10 PROCEDURE — 97161 PT EVAL LOW COMPLEX 20 MIN: CPT | Mod: GP | Performed by: PHYSICAL THERAPIST

## 2023-05-10 PROCEDURE — 250N000013 HC RX MED GY IP 250 OP 250 PS 637: Performed by: NURSE PRACTITIONER

## 2023-05-10 PROCEDURE — 124N000001 HC R&B MH

## 2023-05-10 PROCEDURE — 99232 SBSQ HOSP IP/OBS MODERATE 35: CPT | Mod: GT | Performed by: PSYCHIATRY & NEUROLOGY

## 2023-05-10 RX ADMIN — POLYETHYLENE GLYCOL 3350 17 G: 17 POWDER, FOR SOLUTION ORAL at 08:06

## 2023-05-10 RX ADMIN — IBUPROFEN 600 MG: 600 TABLET ORAL at 21:52

## 2023-05-10 RX ADMIN — QUETIAPINE FUMARATE 250 MG: 200 TABLET ORAL at 21:49

## 2023-05-10 RX ADMIN — CALCIUM CARBONATE 600 MG (1,500 MG)-VITAMIN D3 400 UNIT TABLET 1 TABLET: at 17:01

## 2023-05-10 RX ADMIN — SENNOSIDES AND DOCUSATE SODIUM 2 TABLET: 8.6; 5 TABLET ORAL at 21:48

## 2023-05-10 RX ADMIN — CALCIUM CARBONATE 600 MG (1,500 MG)-VITAMIN D3 400 UNIT TABLET 1 TABLET: at 08:06

## 2023-05-10 RX ADMIN — SENNOSIDES AND DOCUSATE SODIUM 2 TABLET: 8.6; 5 TABLET ORAL at 08:06

## 2023-05-10 ASSESSMENT — ACTIVITIES OF DAILY LIVING (ADL)
ADLS_ACUITY_SCORE: 32
ADLS_ACUITY_SCORE: 52
LAUNDRY: UNABLE TO COMPLETE
ADLS_ACUITY_SCORE: 32
HYGIENE/GROOMING: INDEPENDENT
DRESS: SCRUBS (BEHAVIORAL HEALTH);WITH ASSISTANCE
ADLS_ACUITY_SCORE: 52
HYGIENE/GROOMING: WITH ASSISTANCE
ORAL_HYGIENE: INDEPENDENT
ORAL_HYGIENE: INDEPENDENT
LAUNDRY: UNABLE TO COMPLETE
DRESS: INDEPENDENT;SCRUBS (BEHAVIORAL HEALTH)
ADLS_ACUITY_SCORE: 32
ADLS_ACUITY_SCORE: 32
ADLS_ACUITY_SCORE: 52
ADLS_ACUITY_SCORE: 32

## 2023-05-10 NOTE — PLAN OF CARE
Problem: Adult Behavioral Health Plan of Care  Goal: Patient-Specific Goal (Individualization)  Description: Pt. Will follow recommendations of treatment team during hospital stay.   Pt. Will sleep 6-8 hours a night during hospital stay.  Pt. Will maintain ADLs without prompting during hospital stay.  Pt. Will be free from self harm during hospital stay.   Pt. Will attend > 50% of unit programing during hospital stay.     Pt 1-1 staffing for risk for falls.   Pt is to not have weight on his right foot and must wear the boot when out of bed  Pt to use cold packs on his foot for 20 minutes at a time elevated. Try to do this every other 1 hour when awake  Pt must use a wheel chair when out of bed  Pt will see podiatry on Monday  Pt to have 2-1 staffing to shower in the hallway shower  Pt is to elevate his foot in bed with a bed wedge  Pt must have an Ortho appointment before discharge  Pt needs a skin check of the foot with boot on once per shift    0010-patient in bed with eyes closed and regular respirations noted.   0350-patient awake, requesting an ice pack.   He slept about 5 hours last night.   Face to face end of shift report communicated to oncoming RN.     Nella Vences RN  5/10/2023  6:05 AM    Outcome: Progressing   Goal Outcome Evaluation:    Plan of Care Reviewed With: patient

## 2023-05-10 NOTE — PROGRESS NOTES
"  [facility] PSYCHIATRY  -  PROGRESS NOTE     ID   Name: Connor Soares  MRN#: [id number]     SUBJECTIVE   Prior to interviewing the patient, I met with nursing and reviewed patient's clinical condition. We discussed clinical care both before and after the interview. I have reviewed the patient's clinical course by review of records including previous notes, labs, and vital signs.     Per nursing, the patient had the following behavioral events over the last 24-hours: none, remains on 1:1, attending groups    On psychiatric interview, he is met within his room. He states he is \"pretty good\". Reports he slept better last night with the 250 mg seroquel dose. He denies any paranoia. He reports his foot continues to improve. He does not feel unsteady on his feet. No more dizziness. Reports he is cautious with his gait but tolerating well.  We will explore his options for discharge. Denies SI.        MEDICATIONS   Scheduled Meds:    calcium carbonate-vitamin D  1 tablet Oral BID w/meals     polyethylene glycol  17 g Oral Daily     QUEtiapine  250 mg Oral At Bedtime     senna-docusate  2 tablet Oral BID     vitamin D2  50,000 Units Oral Q7 Days     PRN Meds:.acetaminophen, albuterol, alum & mag hydroxide-simethicone, artificial saliva, diclofenac, hydrOXYzine, ibuprofen, magnesium hydroxide, melatonin, naloxone **OR** naloxone **OR** naloxone **OR** naloxone, OLANZapine **OR** OLANZapine, ondansetron, QUEtiapine, senna-docusate     ALLERGIES   No Known Allergies     VITALS   Vitals: /78 (BP Location: Right arm)   Pulse 89   Temp 98.7  F (37.1  C) (Tympanic)   Resp 16   Ht 1.829 m (6')   Wt (!) 170.1 kg (375 lb 1.6 oz)   SpO2 95%   BMI 50.87 kg/m       MENTAL STATUS EXAM   Appearance:  awake, alert, adequately groomed, dressed in hospital scrubs, and appeared as age stated  Attitude:  cooperative  Eye Contact:  good  Mood:  \"pretty good\"  Affect:  mood congruent  Speech:  clear, coherent  Psychomotor " Behavior:  no evidence of tardive dyskinesia, dystonia, or tics  Thought Process:  logical, linear, and goal oriented  Associations:  no loose associations  Thought Content:  no evidence of suicidal ideation or homicidal ideation  Insight:  limited  Judgment:  limited  Oriented to:  time, person, and place  Attention Span and Concentration:  intact  Recent and Remote Memory:  intact  Gait and Station: Normal       LABS   No results found for this or any previous visit (from the past 24 hour(s)).      ASSESSMENT   Connor Soares is a 33 year old male with a past psychiatric history notable for MDD with psychotic features versus schizoaffective disorder and autism spectrum disorder. Multiple prior inpatient psychiatric hospitalizations. Medication nonadherence.  Presented to outside emergency department accompanied by law enforcement after being found disorganized in the community attempting to search for his dog that he lost over 7 years ago. He was subsequently sent to [facility] unit and started on Seroquel 50 mg at bedtime. Patient was subsequently transferred and accepted for inpatient psychiatric hospitalization at [facility] for further safety and further stabilization     Daily Progress: tolerated increase in Seroquel. Will maintain 250 mg seroquel dose     DIAGNOSTIC FORMULATION   #major depressive disorder with psychotic features  #autism spectrum disorder  #medication nonadherence     PLAN     Location: Unit 5  Legal Status: Orders Placed This Encounter      Legal status 72 Hour Hold    Safety Assessment:    Behavioral Orders   Procedures     Code 1 - Restrict to Unit     Routine Programming     As clinically indicated     Status 15     Every 15 minutes.     Status Individual Observation     Patient SIO status reviewed with team/RN.  Please also refer to RN/team documentation for add'l detail.    -SIO staff to monitor following which have contributed to patient  being on SIO:  Syncope, broken right foot, and generalized weakness  -Possible interventions SIO staff could use to support patient's treatment progress:  Gait belt, boot on when up, no weight bearing right foot, wheel chair, and use hallway shower 2-1 staffi ng  -When following observed, team will review discontinuation of SIO:  N/A     Order Specific Question:   CONTINUOUS 24 hours / day     Answer:   5 feet     Order Specific Question:   Indications for SIO     Answer:   Self-injury risk          PTA medications continued/changed:   -none    New medications tried and stopped:   -None    New medications initiated:   -seroquel --> increased to 200 mg at bedtime --250 mg (05/09/23)    Today's Changes:  Maintain seroquel to 250 mg at bedtime     Programming: Patient will be treated in a therapeutic milieu with appropriate individual and group therapies. Education will be provided on diagnoses, medications, and treatments. Encouraged behavioral activation and participation in group programming.     Medical diagnoses:  Per medicine    #Oblique Fracture through base of the second metatarsal  -pain medication ordered  -ortho to seetoday,  05/8/23    Disposition: pending clinical course , home with services versus IRTS       ATTESTATION    Andrew Monteiro MD  [facility]   Psychiatry    Video Visit: Patient has given verbal consent for video visit?: Yes  Type of Service: video visit for mental health treatment  Reason for Video Visit: COVID-[place]  Originating Site (patient location): [facility]  Distant Site (provider location): Remote Location  Mode of Communication: Video Conference via [employer]  Time of Service: Date: 05/10/2023 , Start: 08:30 end: 09:00

## 2023-05-10 NOTE — PLAN OF CARE
Face to face end of shift report received from Nella GARCIA RN.  Pt observed in [facility] with 1:1 present.         Problem: Adult Behavioral Health Plan of Care  Goal: Patient-Specific Goal (Individualization)  Description: Pt. Will follow recommendations of treatment team during hospital stay.   Pt. Will sleep 6-8 hours a night during hospital stay.  Pt. Will maintain ADLs without prompting during hospital stay.  Pt. Will be free from self harm during hospital stay.   Pt. Will attend > 50% of unit programing during hospital stay.     Pt 1-1 staffing for risk for falls.   Pt is to not have weight on his right foot and must wear the boot when out of bed  Pt to use cold packs on his foot for 20 minutes at a time elevated. Try to do this every other 1 hour when awake  Pt must use a wheel chair when out of bed  Pt will see podiatry on Monday  Pt to have 2-1 staffing to shower in the hallway shower  Pt is to elevate his foot in bed with a bed wedge  Pt must have an Ortho appointment before discharge    Pt needs a skin check of the foot with boot on once per shift  5/10/2023 0745 by Marylou Adrian, RN  Outcome: Progressing     Problem: Psychotic Signs/Symptoms  Goal: Improved Behavioral Control (Psychotic Signs/Symptoms)  Description: Patient will identify 2 healthy coping skills for when anxious.  5/10/2023 0745 by Marylou Adrian, RN  Outcome: Progressing     Problem: Suicidal Behavior  Goal: Suicidal Behavior is Absent or Managed  Outcome: Progressing     Problem: Fall Injury Risk  Goal: Absence of Fall and Fall-Related Injury  Description: Patient will use tap bell at bedside for assistance.  Patient will wear non skid footwear.  Outcome: Progressing   Goal Outcome Evaluation:    Plan of Care Reviewed With: patient      Pt is pleasant and cooperative with writer states he slept well last night.  Pt continues to have 1:1 at his side.  Pt did not have a fall this shift will continue to monitor.   Pt is a SBA.  He been  using his wheel chair and is doing well with this.  Pt denies pain this morning, has been icing his right foot and elevating it.  Admits to some ongoing anxiety and depression. Denies SI, HI, and hallucinations. Cooperative with all scheduled medications.  Has been napping on and off.  Worked with PT this afternoon.  Makes needs known.       Face to face end of shift report communicated to oncoming RN.     Marylou Adrian RN  5/10/2023

## 2023-05-11 ENCOUNTER — APPOINTMENT (OUTPATIENT)
Dept: PHYSICAL THERAPY | Facility: HOSPITAL | Age: 33
DRG: 885 | End: 2023-05-11
Attending: STUDENT IN AN ORGANIZED HEALTH CARE EDUCATION/TRAINING PROGRAM
Payer: MEDICARE

## 2023-05-11 PROCEDURE — 124N000001 HC R&B MH

## 2023-05-11 PROCEDURE — 250N000013 HC RX MED GY IP 250 OP 250 PS 637: Performed by: PSYCHIATRY & NEUROLOGY

## 2023-05-11 PROCEDURE — 999N000104 HC STATISTIC NO CHARGE

## 2023-05-11 PROCEDURE — 97530 THERAPEUTIC ACTIVITIES: CPT | Mod: GP | Performed by: PHYSICAL THERAPIST

## 2023-05-11 PROCEDURE — 250N000013 HC RX MED GY IP 250 OP 250 PS 637: Performed by: NURSE PRACTITIONER

## 2023-05-11 PROCEDURE — 99232 SBSQ HOSP IP/OBS MODERATE 35: CPT | Mod: GT | Performed by: PSYCHIATRY & NEUROLOGY

## 2023-05-11 RX ADMIN — POLYETHYLENE GLYCOL 3350 17 G: 17 POWDER, FOR SOLUTION ORAL at 08:14

## 2023-05-11 RX ADMIN — SENNOSIDES AND DOCUSATE SODIUM 2 TABLET: 8.6; 5 TABLET ORAL at 08:14

## 2023-05-11 RX ADMIN — HYDROXYZINE HYDROCHLORIDE 25 MG: 25 TABLET, FILM COATED ORAL at 17:04

## 2023-05-11 RX ADMIN — MELATONIN 5 MG TABLET 5 MG: at 21:20

## 2023-05-11 RX ADMIN — HYDROXYZINE HYDROCHLORIDE 25 MG: 25 TABLET, FILM COATED ORAL at 23:24

## 2023-05-11 RX ADMIN — IBUPROFEN 600 MG: 600 TABLET ORAL at 21:23

## 2023-05-11 RX ADMIN — QUETIAPINE 300 MG: 100 TABLET ORAL at 21:20

## 2023-05-11 RX ADMIN — HYDROXYZINE HYDROCHLORIDE 25 MG: 25 TABLET, FILM COATED ORAL at 08:25

## 2023-05-11 RX ADMIN — ACETAMINOPHEN 325MG 650 MG: 325 TABLET ORAL at 23:27

## 2023-05-11 RX ADMIN — SENNOSIDES AND DOCUSATE SODIUM 2 TABLET: 8.6; 5 TABLET ORAL at 21:20

## 2023-05-11 RX ADMIN — CALCIUM CARBONATE 600 MG (1,500 MG)-VITAMIN D3 400 UNIT TABLET 1 TABLET: at 16:32

## 2023-05-11 RX ADMIN — CALCIUM CARBONATE 600 MG (1,500 MG)-VITAMIN D3 400 UNIT TABLET 1 TABLET: at 08:14

## 2023-05-11 ASSESSMENT — ACTIVITIES OF DAILY LIVING (ADL)
ADLS_ACUITY_SCORE: 52
HYGIENE/GROOMING: WITH ASSISTANCE
ADLS_ACUITY_SCORE: 52
ORAL_HYGIENE: WITH ASSISTANCE
DRESS: WITH ASSISTANCE
ADLS_ACUITY_SCORE: 52

## 2023-05-11 NOTE — PLAN OF CARE
"  Problem: Adult Behavioral Health Plan of Care  Goal: Adheres to Safety Considerations for Self and Others  Outcome: Progressing     Problem: Psychotic Signs/Symptoms  Goal: Improved Behavioral Control (Psychotic Signs/Symptoms)  Description: Patient will identify 2 healthy coping skills for when anxious.  Outcome: Progressing     Problem: Psychotic Signs/Symptoms  Goal: Improved Mood Symptoms  Description: Patient will regain and maintain baseline thought process/mood by discharge.  Outcome: Progressing     Problem: Suicidal Behavior  Goal: Suicidal Behavior is Absent or Managed  Outcome: Progressing     Problem: Fall Injury Risk  Goal: Absence of Fall and Fall-Related Injury  Description: Patient will use tap bell at bedside for assistance.  Patient will wear non skid footwear.  Outcome: Progressing   07:20 - Face to face end of shift report has been received from night shift rn, pt in room with 1:1 staff at bedside, pt with eyes closed and respirations non labored  08:30 - pt out for breakfast in wheelchair with 1:1 staff, pt rates pain tolerable at 1/10, no numbness or tingling in rt foot, states depression is at a tolerable level, \"maybe 1/10 states has gotten better, agreed to take atarax prn for anxiety 3/10.  Denies hi or si.   13:00 - pt in bed with eyes closed and respirations non labored,  Pt has 1:1 staff in room.  Was seen by PT today and used the scooter and pt states he thinks it will be a good help for him.    15:15 - face to face end of shift report has been communicated to evening shift rn.                        "

## 2023-05-11 NOTE — PROGRESS NOTES
05/10/23 1402   Appointment Info   Signing Clinician's Name / Credentials (PT) Jessica Chilton, DPT   Living Environment   People in Home alone   Current Living Arrangements apartment   Home Accessibility no concerns  (Pt reports there is a handicap accessible ramp to get into his apartment building and elevator access inside.)   Transportation Anticipated car, drives self;agency   [place]   Self-Care   Usual Activity Tolerance good   Current Activity Tolerance fair   Equipment Currently Used at Home none   Fall history within last six months yes   Number of times patient has fallen within last six months 1  (fall during hospitalization)   Activity/Exercise/Self-Care Comment Pt is independent at baseline   General Information   Onset of Illness/Injury or Date of Surgery 04/30/23   Referring Physician Dr. Monteiro   Patient/Family Therapy Goals Statement (PT) to go home and be independent   Pertinent History of Current Problem (include personal factors and/or comorbidities that impact the POC) Pt admitted to  unit from [facility].  During process of adjusting medications, pt was dizzy and symptomatic and sustained a fall on  unit resulting in right second metatarsal fracture.  Pt now NWB with CAM boot on, can WB minimally to manage stairs   Weight-Bearing Status - RLE nonweight-bearing   General Observations Pt resting in bed, pleasant and cooperative with PT   Cognition   Affect/Mental Status (Cognition) WFL   Orientation Status (Cognition) oriented x 3   Follows Commands (Cognition) WFL   Pain Assessment   Patient Currently in Pain No   Integumentary/Edema   Integumentary/Edema Comments mild edema to R LE noted   Range of Motion (ROM)   Range of Motion ROM is WFL   Strength (Manual Muscle Testing)   Strength (Manual Muscle Testing) strength is WFL   Strength Comments except R ankle NT   Bed Mobility   Bed Mobility no deficits identified    Transfers   Transfers bed-chair transfer   Bed-Chair Transfer   Bed-Chair [place] (Transfers) supervision   Comment, (Bed-Chair Transfer) able to complete squat pivot transfer from w/c<>bed maintain WB   Gait/Stairs (Locomotion)   Comment, (Gait/Stairs) Ambulation not assessed due to appropriate resources not available.  Pt does not have adequate balance to trail crutches.  Available knee scooter does not accommodate pt's weight, and bariatric walker not present for session   Balance   Balance Comments good sitting   Clinical Impression   Criteria for Skilled Therapeutic Intervention Yes, treatment indicated   PT Diagnosis (PT) impaired functional transfers and mobility   Influenced by the following impairments decreased balance, decreased activity tolerance, decreased stability   Functional limitations due to impairments decreased tolerance for functional mobility and ADLs in light of NWB status of R LE   Clinical Presentation (PT Evaluation Complexity) Stable/Uncomplicated   Clinical Presentation Rationale clinical judgement   Clinical Decision Making (Complexity) low complexity   Planned Therapy Interventions (PT) balance training;gait training;neuromuscular re-education;patient/family education;stair training;strengthening;transfer training;wheelchair management/propulsion training;progressive activity/exercise;risk factor education;home program guidelines   Anticipated Equipment Needs at Discharge (PT)   (possible bariatric knee scooter or appropriate sized wheelchair)   Risk & Benefits of therapy have been explained evaluation/treatment results reviewed;care plan/treatment goals reviewed;risks/benefits reviewed;participants included;patient   Clinical Impression Comments PT evaluation completed. Pt typically independent with mobility and ADLs at baseline.  Pt now NWB through R LE and has limitations in regards to functional mobility.  At this time pt does not demonstrate safety with mobilize with  crutches, knee scooter does not accommodate pt's weight, and bariatric FWW not present for session.  With pt's ability to access and manage apartment through handicap accessible entrance and elevator access, use of bariatric knee scooter or wheelchair may be best option for discharge.  Will work on obtaining bariatric knee scooter to trial and continue to assist with discharge planning.  May require some home PT for home safety assessment once discharged back to [facility].   PT Total Evaluation Time   PT Nia Low Complexity Minutes (26440) 24   Physical Therapy Goals   PT Frequency 3x/week   PT Predicted Duration/Target Date for Goal Attainment 05/24/23   PT Goals Transfers;PT Goal 1   PT: Transfers Modified independent;Bed to/from chair;Assistive device;Within precautions   PT: Gait Modified independent;Rolling walker;25 feet   PT: Goal 1 Pt to safely mobilize with use of knee scooter maintianing NWB status on R LE to safely access apartment and home.   Interventions   Interventions Quick Adds Therapeutic Activity   Therapeutic Activity   Therapeutic Activities: dynamic activities to improve functional performance Minutes (23674) 25   Symptoms Noted During/After Treatment None   Treatment Detail/Skilled Intervention Pt provided with extensive education in regards to adaptive equipment options for discharge.  Discussed need to find bariatric knee scooter and worked with healthline to obtain bariatric knee scooter for tomorrow.  Also discussed trialing bariatric FWW for short distance ambulation but discussed the amount of UE strength and endurance this requires.  Discussed that crutches are not reasonable option at this time due his reported baseline imbalance.  Discussed possibility of getting wheelchair however due to size and location, this may be a barrier.  Pt very willing to do what he needs and trial what he needs to be able to return home as independently as he can.  Did make calls to healthline and will   bariatric knee scooter to trial tomorrow.  [facility] if one is needed upon discharge.  Pt left resting in bed with 1:1 present   PT Discharge Planning   PT Plan Trial bariatric knee scooter, assist with determining appropriate assistive devices for discharge to home   PT Discharge Recommendation (DC Rec) home;home with home care physical therapy   PT Rationale for DC Rec Pt may require home PT for home safet assessment upon discharge, dependening on level of function at discharge.   PT Brief overview of current status pivot transfer bed<>wheelchair SBA maintaining NWB on R LE   Total Session Time   Timed Code Treatment Minutes 25   Total Session Time (sum of timed and untimed services) 49

## 2023-05-11 NOTE — PLAN OF CARE
Problem: Adult Behavioral Health Plan of Care  Goal: Patient-Specific Goal (Individualization)  Description: Pt. Will follow recommendations of treatment team during hospital stay.   Pt. Will sleep 6-8 hours a night during hospital stay.  Pt. Will maintain ADLs without prompting during hospital stay.  Pt. Will be free from self harm during hospital stay.   Pt. Will attend > 50% of unit programing during hospital stay.     Pt 1-1 staffing for risk for falls.   Pt is to not have weight on his right foot and must wear the boot when out of bed  Pt to use cold packs on his foot for 20 minutes at a time elevated. Try to do this every other 1 hour when awake  Pt must use a wheel chair when out of bed  Pt will see podiatry on Monday  Pt to have 2-1 staffing to shower in the hallway shower  Pt is to elevate his foot in bed with a bed wedge  Pt must have an Ortho appointment before discharge    Pt needs a skin check of the foot with boot on once per shift  Outcome: Progressing  Note:     1730 Patient with attendant at bedside. Patient using scooter and got up from dinner table and the pens were jostled and patient lost his balance a bit but did not fall. Patient attended one group so far this afternoon. Has been staying off of his right foot. Patient pleasant, calm and cooperative. Feels embarrassed about loss of balance. Given Hydroxyzine 25 mg po for anxiety at 1704.    1830 Patient assisted with a shower this evening and patient did well with no issues.    2230 Patient in bed with eyes closed and regular resps.    Face to face end of shift report communicated to 11-7 shift RN.     2327 Patient requested and given Hydroxyzine 25 mg po with Tylenol 650 mg po for anxiety and sleep.    0100 Patient in bed with 1-1 at bedside. Eyes closed and resps. Regular.    0630 Patient in bed with 1-1 at his side. Patient slept about 7 hours.    Face to face end of shift report communicated to 7-3 shift RN.     Josephine Rey,  RN  5/12/2023  6:54 AM          Josephine Rey RN  5/11/2023  10:30 PM          Problem: Psychotic Signs/Symptoms  Goal: Improved Behavioral Control (Psychotic Signs/Symptoms)  Description: Patient will identify 2 healthy coping skills for when anxious.  Outcome: Progressing     Problem: Suicidal Behavior  Goal: Suicidal Behavior is Absent or Managed  Outcome: Progressing     Problem: Fall Injury Risk  Goal: Absence of Fall and Fall-Related Injury  Description: Patient will use tap bell at bedside for assistance.  Patient will wear non skid footwear.  Outcome: Progressing      Goal Outcome Evaluation:    Plan of Care Reviewed With: patient

## 2023-05-11 NOTE — PROGRESS NOTES
"   05/11/23 1213   Appointment Info   Signing Clinician's Name / Credentials (PT) Jessica Álvarez DPT   Interventions   Interventions Quick Adds Therapeutic Activity   Therapeutic Activity   Therapeutic Activities: dynamic activities to improve functional performance Minutes (91214) 26   Symptoms Noted During/After Treatment None   Treatment Detail/Skilled Intervention Pt in bed upon PT arrival, pleasant and agreeable to PT.  Obtained bariatric knee scooter, visually instructed pt in use of knee scooter including use of brakes.  Pt able to don CAM boot independently.  Pt transferred sit>stand mod I from EOB, maintains TTWB through heel only for balance while adjusting pants and shirt.  Pt was able to transfer onto knee scooter.  Pt felt fit was appropriate, mobilized approx 20' with knee scooter with SBA but pt felt he needed platform lowered so returned to room.  Pt verbally instructed on proper positioning by bed to be able to complete transfer back to EOB with minimal WB through R LE.  Adjusted height of platform to fit pt more appropriately.  Pt then mobilized approx 200' with knee scooter SBA-mod I, able to maneuver around objects.  Did verbally instruct pt how to  front end of knee scooter slightly inorder to make sharper turns if needed in tight spaces.  Pt tolerating well, did demonstrate some fatigue but pt reported feeling very good about having a way to move around independently and be able to stand up fully.  Pt returned to room and transferred into bed SBA, did \"plop\" down so cued to work on controlled transfers.  Did discuss that care team would need to determine if pt can utilize knee scooter while on unit wiht 1:1 present.  Discussed with OT as well.  Pt also asking about transportation when he returns to the [facility].  Discussed  would be able to help assist in that information.  Talked about having groceries delivered if needed.  Discussed rental of knee scooter and ability to " return to [place] location in [facility].  Pt verbalized understanding and appreciative of all information.   PT Discharge Planning   PT Plan Will follow up on Monday, reassess knee scooter and issue for discharge if appropriate   PT Discharge Recommendation (DC Rec) home;home with home care physical therapy   PT Rationale for DC Rec Pt would benefit from home PT for at least a 1x home safety assessment with use of knee scooter   PT Brief overview of current status SBA-mod I with knee scooter   Total Session Time   Timed Code Treatment Minutes 26   Total Session Time (sum of timed and untimed services) 26

## 2023-05-11 NOTE — PLAN OF CARE
Pt withdrawn in room throughout most if shift. PT came out for dinner. He reports he felt anxiety when he was out eating, feeling like people were watching him eat and thinking about him, which he said probably in not true but it made him anxious.   He denies SI/HI and hallucinations. He reports he has been starting to feel better since being back on medication.      He declined a shower this shift.  He has been using his ice intermittently.   He requested PRN Ibuprofen 600mg with his medications.     Face to face end of shift report communicated to oncoming RN     Bren Mejia RN  5/10/2023  10:51 PM                    Problem: Adult Behavioral Health Plan of Care  Goal: Patient-Specific Goal (Individualization)  Description: Pt. Will follow recommendations of treatment team during hospital stay.   Pt. Will sleep 6-8 hours a night during hospital stay.  Pt. Will maintain ADLs without prompting during hospital stay.  Pt. Will be free from self harm during hospital stay.   Pt. Will attend > 50% of unit programing during hospital stay.     Pt 1-1 staffing for risk for falls.   Pt is to not have weight on his right foot and must wear the boot when out of bed  Pt to use cold packs on his foot for 20 minutes at a time elevated. Try to do this every other 1 hour when awake  Pt must use a wheel chair when out of bed  Pt will see podiatry on Monday  Pt to have 2-1 staffing to shower in the hallway shower  Pt is to elevate his foot in bed with a bed wedge  Pt must have an Ortho appointment before discharge    Pt needs a skin check of the foot with boot on once per shift  Outcome: Progressing     Problem: Fall Injury Risk  Goal: Absence of Fall and Fall-Related Injury  Description: Patient will use tap bell at bedside for assistance.  Patient will wear non skid footwear.  Outcome: Progressing     Problem: Suicidal Behavior  Goal: Suicidal Behavior is Absent or Managed  Outcome: Progressing   Goal Outcome Evaluation:    Plan  of Care Reviewed With: patient

## 2023-05-11 NOTE — PLAN OF CARE
Problem: Adult Behavioral Health Plan of Care  Goal: Patient-Specific Goal (Individualization)  Description: Pt. Will follow recommendations of treatment team during hospital stay.   Pt. Will sleep 6-8 hours a night during hospital stay.  Pt. Will maintain ADLs without prompting during hospital stay.  Pt. Will be free from self harm during hospital stay.   Pt. Will attend > 50% of unit programing during hospital stay.     Pt 1-1 staffing for risk for falls.   Pt is to not have weight on his right foot and must wear the boot when out of bed  Pt to use cold packs on his foot for 20 minutes at a time elevated. Try to do this every other 1 hour when awake  Pt must use a wheel chair when out of bed  Pt will see podiatry on Monday  Pt to have 2-1 staffing to shower in the hallway shower  Pt is to elevate his foot in bed with a bed wedge  Pt must have an Ortho appointment before discharge    Pt needs a skin check of the foot with boot on once per shift  Outcome: Progressing     Pt observed sleeping in bed. 1:1 at bedside. 15 minute and PRN safety checks completed. No self harm or falls noted or reported this shift. Pt slept 7 hours.     Face to face report will be communicated to oncoming RN.    Tracey Nieves RN  5/11/2023

## 2023-05-12 ENCOUNTER — APPOINTMENT (OUTPATIENT)
Dept: OCCUPATIONAL THERAPY | Facility: HOSPITAL | Age: 33
DRG: 885 | End: 2023-05-12
Attending: STUDENT IN AN ORGANIZED HEALTH CARE EDUCATION/TRAINING PROGRAM
Payer: MEDICARE

## 2023-05-12 PROCEDURE — 250N000013 HC RX MED GY IP 250 OP 250 PS 637: Performed by: PSYCHIATRY & NEUROLOGY

## 2023-05-12 PROCEDURE — 97535 SELF CARE MNGMENT TRAINING: CPT | Mod: GO

## 2023-05-12 PROCEDURE — 250N000013 HC RX MED GY IP 250 OP 250 PS 637: Performed by: NURSE PRACTITIONER

## 2023-05-12 PROCEDURE — 99233 SBSQ HOSP IP/OBS HIGH 50: CPT | Performed by: NURSE PRACTITIONER

## 2023-05-12 PROCEDURE — 99232 SBSQ HOSP IP/OBS MODERATE 35: CPT | Mod: GT | Performed by: PSYCHIATRY & NEUROLOGY

## 2023-05-12 PROCEDURE — 124N000001 HC R&B MH

## 2023-05-12 RX ADMIN — MELATONIN 5 MG TABLET 5 MG: at 20:09

## 2023-05-12 RX ADMIN — HYDROXYZINE HYDROCHLORIDE 25 MG: 25 TABLET, FILM COATED ORAL at 16:29

## 2023-05-12 RX ADMIN — SENNOSIDES AND DOCUSATE SODIUM 2 TABLET: 8.6; 5 TABLET ORAL at 08:07

## 2023-05-12 RX ADMIN — HYDROXYZINE HYDROCHLORIDE 25 MG: 25 TABLET, FILM COATED ORAL at 08:14

## 2023-05-12 RX ADMIN — HYDROXYZINE HYDROCHLORIDE 25 MG: 25 TABLET, FILM COATED ORAL at 20:29

## 2023-05-12 RX ADMIN — IBUPROFEN 600 MG: 600 TABLET ORAL at 16:35

## 2023-05-12 RX ADMIN — CALCIUM CARBONATE 600 MG (1,500 MG)-VITAMIN D3 400 UNIT TABLET 1 TABLET: at 08:07

## 2023-05-12 RX ADMIN — CALCIUM CARBONATE 600 MG (1,500 MG)-VITAMIN D3 400 UNIT TABLET 1 TABLET: at 16:29

## 2023-05-12 RX ADMIN — SENNOSIDES AND DOCUSATE SODIUM 2 TABLET: 8.6; 5 TABLET ORAL at 20:05

## 2023-05-12 RX ADMIN — POLYETHYLENE GLYCOL 3350 17 G: 17 POWDER, FOR SOLUTION ORAL at 08:07

## 2023-05-12 RX ADMIN — QUETIAPINE 300 MG: 100 TABLET ORAL at 20:05

## 2023-05-12 ASSESSMENT — ACTIVITIES OF DAILY LIVING (ADL)
ADLS_ACUITY_SCORE: 52

## 2023-05-12 NOTE — PROGRESS NOTES
"  [facility] PSYCHIATRY  -  PROGRESS NOTE     ID   Name: Connor Soares  MRN#: [id number]     SUBJECTIVE   Prior to interviewing the patient, I met with nursing and reviewed patient's clinical condition. We discussed clinical care both before and after the interview. I have reviewed the patient's clinical course by review of records including previous notes, labs, and vital signs.     Per nursing, the patient had the following behavioral events over the last 24-hours: utilizing bariatric scooter. Working with PT, slept well overnight.     On psychiatric interview, he is met within his room. Reports he slept better last night. Tolerating the scooter. Reports he enjoyed the mindfullness group yesterday. Denies SI. Willing to continue to work with PT and OT.        MEDICATIONS   Scheduled Meds:    calcium carbonate-vitamin D  1 tablet Oral BID w/meals     polyethylene glycol  17 g Oral Daily     QUEtiapine  300 mg Oral At Bedtime     senna-docusate  2 tablet Oral BID     vitamin D2  50,000 Units Oral Q7 Days     PRN Meds:.acetaminophen, albuterol, alum & mag hydroxide-simethicone, artificial saliva, diclofenac, hydrOXYzine, ibuprofen, magnesium hydroxide, melatonin, naloxone **OR** naloxone **OR** naloxone **OR** naloxone, OLANZapine **OR** OLANZapine, ondansetron, QUEtiapine, senna-docusate     ALLERGIES   No Known Allergies     VITALS   Vitals: /79 (BP Location: Right arm)   Pulse 93   Temp 97.6  F (36.4  C) (Tympanic)   Resp 18   Ht 1.829 m (6')   Wt (!) 170.1 kg (375 lb 1.6 oz)   SpO2 99%   BMI 50.87 kg/m       MENTAL STATUS EXAM   Appearance:  awake, alert, adequately groomed, dressed in hospital scrubs, and appeared as age stated  Attitude:  cooperative  Eye Contact:  good  Mood:  \"good\"  Affect:  mood congruent  Speech:  clear, coherent  Psychomotor Behavior:  no evidence of tardive dyskinesia, dystonia, or tics  Thought Process:  logical, linear, and goal oriented  Associations:  no loose " associations  Thought Content:  no evidence of suicidal ideation or homicidal ideation  Insight:  limited  Judgment:  limited  Oriented to:  time, person, and place  Attention Span and Concentration:  intact  Recent and Remote Memory:  intact  Gait and Station: Normal       LABS   No results found for this or any previous visit (from the past 24 hour(s)).      ASSESSMENT   Connor Soares is a 33 year old male with a past psychiatric history notable for MDD with psychotic features versus schizoaffective disorder and autism spectrum disorder. Multiple prior inpatient psychiatric hospitalizations. Medication nonadherence.  Presented to outside emergency department accompanied by law enforcement after being found disorganized in the community attempting to search for his dog that he lost over 7 years ago. He was subsequently sent to [facility] unit and started on Seroquel 50 mg at bedtime. Patient was subsequently transferred and accepted for inpatient psychiatric hospitalization at [facility] for further safety and further stabilization     Daily Progress: continue to work with OT and  PT .appreciate their recommendations. Okay to utilze scooter on the unit. Increase Seroquel to 300 mg at bedtime.      DIAGNOSTIC FORMULATION   #major depressive disorder with psychotic features  #autism spectrum disorder  #medication nonadherence     PLAN     Location: Unit 5  Legal Status: Orders Placed This Encounter      Legal status Voluntary    Safety Assessment:    Behavioral Orders   Procedures     Code 1 - Restrict to Unit     Routine Programming     As clinically indicated     Status 15     Every 15 minutes.     Status Individual Observation     Patient SIO status reviewed with team/RN.  Please also refer to RN/team documentation for add'l detail.    -SIO staff to monitor following which have contributed to patient being on SIO:  Syncope, broken right foot, and generalized  weakness  -Possible interventions SIO staff could use to support patient's treatment progress:  Gait belt, boot on when up, no weight bearing right foot, wheel chair, and use hallway shower 2-1 staffi ng  -When following observed, team will review discontinuation of SIO:  N/A     Order Specific Question:   CONTINUOUS 24 hours / day     Answer:   5 feet     Order Specific Question:   Indications for SIO     Answer:   Self-injury risk          PTA medications continued/changed:   -none    New medications tried and stopped:   -None    New medications initiated:   -seroquel --> increased to 200 mg at bedtime --250 mg (05/09/23) -> 300 mg at bedtime (5/11/23)    Today's Changes:  Increase Seroquel to 300 mg at bedtime     Programming: Patient will be treated in a therapeutic milieu with appropriate individual and group therapies. Education will be provided on diagnoses, medications, and treatments. Encouraged behavioral activation and participation in group programming.     Medical diagnoses:  Per medicine    #Oblique Fracture through base of the second metatarsal  -pain medication ordered  -ortho to seetoday,  05/8/23    Disposition: pending clinical course , home with services versus IRTS       ATTESTATION    Andrew Monteiro MD  [facility]   Psychiatry    Video Visit: Patient has given verbal consent for video visit?: Yes  Type of Service: video visit for mental health treatment  Reason for Video Visit: COVID-[place]  Originating Site (patient location): [facility]  Distant Site (provider location): Remote Location  Mode of Communication: Video Conference via [employer]  Time of Service: Date: 05/12/2023 , Start: 08:00 end: 08:30

## 2023-05-12 NOTE — PLAN OF CARE
Shift note 6 am- 1130pm    Face to face end of shift report received from Josephine LUIS RN.  [place] [facility]     Problem: Adult Behavioral Health Plan of Care  Goal: Patient-Specific Goal (Individualization)  Description: Pt. Will follow recommendations of treatment team during hospital stay.   Pt. Will sleep 6-8 hours a night during hospital stay.  Pt. Will maintain ADLs without prompting during hospital stay.  Pt. Will be free from self harm during hospital stay.   Pt. Will attend > 50% of unit programing during hospital stay.     Pt 1-1 staffing for risk for falls.   Pt is to not have weight on his right foot and must wear the boot when out of bed  Pt to use cold packs on his foot for 20 minutes at a time elevated. Try to do this every other 1 hour when awake  Pt must use a wheel chair when out of bed  Pt will see podiatry on Monday  Pt to have 2-1 staffing to shower in the hallway shower  Pt is to elevate his foot in bed with a bed wedge  Pt must have an Ortho appointment before discharge    Pt needs a skin check of the foot with boot on once per shift  Outcome: Progressing     Problem: Psychotic Signs/Symptoms  Goal: Improved Behavioral Control (Psychotic Signs/Symptoms)  Description: Patient will identify 2 healthy coping skills for when anxious.  Outcome: Progressing     Problem: Suicidal Behavior  Goal: Suicidal Behavior is Absent or Managed  Outcome: Progressing     Problem: Fall Injury Risk  Goal: Absence of Fall and Fall-Related Injury  Description: Patient will use tap bell at bedside for assistance.  Patient will wear non skid footwear.  Outcome: Progressing   Goal Outcome Evaluation:          Pt is pleasant and cooperative with writer.  Pt continue to c/o high anxiety.  Requested PRN atarax 25 mg at 814.  Pt later reported PRN effective.  Pt admits to some ongoing depression.  Denies SI, HI, pain, and hallucinations.  Pt continues to have 1:1 by his side.  Is a SBA with transfers.  Has been using a  scooter for ambulation.    Pt did not have a fall this shift. Has been in the lounge and has been social with peers.   Did attend one group today.  Showered this evening. 2029  requested PRN atarax 25 mg for anxiety.       Face to face end of shift report communicated to oncoming RN.     Marylou Adrian RN  5/12/2023

## 2023-05-12 NOTE — PROGRESS NOTES
"[facility]    Medical Services Progress Note    Date of Service (when I saw the patient): 05/12/2023      Assessment & Plan     Principal Problem:    Psychosis (H)    Active Medical Problems:  Morbid obesity- 170.1 kg. BMI 50.87. Reports he was started on ozempic and only took it 2 weeks before stopping due to side effects. Last A1c in August 5.6.  Glucose in the ED wnl  at 89. A1c today is 5.7 which does put him in the prediabetic range.     Prediabetes- a1c 5.7. Discussed with him about starting metformin. We discussed side effects of the medication as well as the risks and benefits of starting it. Discussed weight loss, exercise and diet. Foods to avoid or limit such as breads, sugars, pasta.  He is not interested in starting it  and states \"I will pass on the metformin, I will try and lose weight.\"    Exercise induced asthma- reports he last used his albuterol inhaler 6 months ago. Denies chest pain, sob. Albuterol inhaler as needed.   5/5- denies chest pain, sob, difficulty breathing.   5/9- denies chest pain, sob, difficulty breathing.   5/12- Denies chest pain, sob, difficulty breathing.     High fall risk- pt reports he was in bed and agitated, anxious and tense and got up to go to the nurses station to ask for medication and after he got to the nurses station he had a \"dizzy spell\" and grabbed the table and fell backwards on his back. He denies any back pain, hip pain, denies hitting his head. Denies loss of consciousness. He reports he twisted his right foot and now can't bear weight on it. There is so edema noted, no erythema noted. He has limited range of motion. Pt believes his Abilify and seroquel made him dizzy.  Pt was provided a tap bell, wheelchair and room moved near the nurses station. OT consult.   5/9- no reported falls. Pt on 1:1 for safety.   5/12- no reported falls. Remains on 1:1.     Right foot pain- see note above. Xray ordered.  FINDINGS:  There is an oblique fracture through " "the base of the second metatarsal. The remainder of the foot is intact. The articular spaces are normal in height. IMPRESSION: Second metatarsal fracture proximally.    Consulted with Dr. Avina and Dr. Davila. Dr. Davila reviewed xrays and recommended a long CAM boot, and fully offload with crutches, knee scooter, wheelchair. He is currently in a wheelchair and CAM boot given to nurse with instructions, skin and foot to be assessed q shift. Care plan to be updated.   He will need follow up appointment set up with podiatry or ortho at discharge. Dr. aDvila will see the pt on the unit on Monday. Ice as needed. Tylenol, ibuprofen, diclofeneac as needed. Oxycodone as needed for 3 days. Can transition to tramadol if needed. Vitamin D level ordered. Will start Caltrate for bone healing. Pt placed on 1:1 for safety. Discussed plan with pt and he is appreciative.   5/9- Dr. Davila seen pt yesterday. Per podiatry ---Patient is advised to fully offload the foot for at least six weeks to prevent the metatarsal fracture from shifting or further displacing.  ---Patient has a long leg CAM boot and wheelchair  ---Advised to wear the CAM boot at all times when he is not in bed. He is advised to offload the foot with crutches or a wheelchair.   ---Patient will need repeat radiographs and follow-up with podiatry or orthopaedics in 4-5 weeks. When is ready to transition back to walking, he may benefit from an orthotic. He may develop arthritis near the fracture site at the joint of the 2nd TMTJ.   -Additional instructions:  ---Elevate foot as much as possible  ---Mildly compress the foot and leg for edema control when possible with a 4\" ACE wrap   ---Ice painful foot daily  Pt reports he has an elevator at his apartment that he uses to get to the basement. He states as long as the elevator is working he does not have to utilize any stairs. He reports his pain is manageable with tylenol and ibuprofen.   5/12- Per Dr. Davila pt can " bear weight while using stairs, if needed. And then return to using his knee scooter.  pt reports only minimal pain and states its tolerable. Denies any concerns or complaints. PT recommended a bariatric knee scooter for him at discharge. This has been ordered. Referral for ortho appointment sent. He has a hospital bed to help with elevation and comfort. Continue using CAM boot, ice as needed, tylenol as needed.      Xerostomia- reports he is drinking plenty of water but feels his mouth is dry. He reports he always has issues with dry mouth. Biotene ordered.   5/12- resolved    Constipation- complained of constipation for the past 2 days. He was given miralax, milk of magnesium and prune juice and reports he had a large bowel movement this morning. Denies abdominal pain. And states he would only like senna as needed.   5/12- denies constipation.     Vitamin D deficiency- vitamin D level 16. Vitamin D 50,000 units every week for 8 weeks. Caltrate twice daily.          Code Status: Full Code.    Sherrie Parra CNP        -Data reviewed today: I reviewed all new labs and imaging results over the last 24 hours.     Physical Exam   Temp: 97.6  F (36.4  C) Temp src: Tympanic BP: 117/79 Pulse: 93   Resp: 18 SpO2: 99 % O2 Device: None (Room air)    Vitals:    04/30/23 1500   Weight: (!) 170.1 kg (375 lb 1.6 oz)     Vital Signs with Ranges  Temp:  [97.6  F (36.4  C)] 97.6  F (36.4  C)  Pulse:  [93] 93  Resp:  [18] 18  BP: (117)/(79) 117/79  SpO2:  [99 %] 99 %  No intake/output data recorded.    Constitutional: awake, alert, cooperative, no apparent distress, and appears stated age. Vitals stable   Respiratory: No increased work of breathing, good air exchange, clear to auscultation bilaterally, no crackles or wheezing  Cardiovascular: Normal apical impulse, regular rate and rhythm, normal S1 and S2, no S3 or S4, and no murmur noted  Skin: CAM boot intact, otherwise normal skin color, texture, turgor  Musculoskeletal:  Limited  range of motion noted to right foot. Cam boot, otherwise full range of motion.     Neurologic: Awake, alert, oriented to name, place and time.    Neuropsychiatric: General: normal, calm and normal eye contact    Medications     calcium carbonate-vitamin D  1 tablet Oral BID w/meals     polyethylene glycol  17 g Oral Daily     QUEtiapine  300 mg Oral At Bedtime     senna-docusate  2 tablet Oral BID     vitamin D2  50,000 Units Oral Q7 Days       Data   No lab results found in last 7 days.    No results found for this or any previous visit (from the past 24 hour(s)).

## 2023-05-12 NOTE — PROGRESS NOTES
[facility] PSYCHIATRY  -  PROGRESS NOTE     ID   Name: Connor Soares  MRN#: [id number]     SUBJECTIVE   Prior to interviewing the patient, I met with nursing and reviewed patient's clinical condition. We discussed clinical care both before and after the interview. I have reviewed the patient's clinical course by review of records including previous notes, labs, and vital signs.     Per nursing, the patient had the following behavioral events over the last 24-hours: none, remains on 1:1, attending groups, working with PT    On psychiatric interview, he is met withinthe milieu. Still noting some difficulty sleeping, difficult to get to sleep. Encouraged him not to nap during the day. Agrees to increase Seroquel to 300 mg at bedtime. Is working with PT. Receptive to recommendations. Is understanding that he will likely dismiss to his residence where he has an elevator and a ramp. He agrees with this. Denies any side effects from meds. Feels foot is getting better each day from a pain and mobility standpoint. Denies SI.        MEDICATIONS   Scheduled Meds:    calcium carbonate-vitamin D  1 tablet Oral BID w/meals     polyethylene glycol  17 g Oral Daily     QUEtiapine  300 mg Oral At Bedtime     senna-docusate  2 tablet Oral BID     vitamin D2  50,000 Units Oral Q7 Days     PRN Meds:.acetaminophen, albuterol, alum & mag hydroxide-simethicone, artificial saliva, diclofenac, hydrOXYzine, ibuprofen, magnesium hydroxide, melatonin, naloxone **OR** naloxone **OR** naloxone **OR** naloxone, OLANZapine **OR** OLANZapine, ondansetron, QUEtiapine, senna-docusate     ALLERGIES   No Known Allergies     VITALS   Vitals: /79 (BP Location: Right arm)   Pulse 93   Temp 97.7  F (36.5  C) (Tympanic)   Resp 18   Ht 1.829 m (6')   Wt (!) 170.1 kg (375 lb 1.6 oz)   SpO2 95%   BMI 50.87 kg/m       MENTAL STATUS EXAM   Appearance:  awake, alert, adequately groomed, dressed in hospital scrubs, and appeared as age  "stated  Attitude:  cooperative  Eye Contact:  good  Mood:  \"good\"  Affect:  mood congruent  Speech:  clear, coherent  Psychomotor Behavior:  no evidence of tardive dyskinesia, dystonia, or tics  Thought Process:  logical, linear, and goal oriented  Associations:  no loose associations  Thought Content:  no evidence of suicidal ideation or homicidal ideation  Insight:  limited  Judgment:  limited  Oriented to:  time, person, and place  Attention Span and Concentration:  intact  Recent and Remote Memory:  intact  Gait and Station: Normal       LABS   No results found for this or any previous visit (from the past 24 hour(s)).      ASSESSMENT   Connor Soares is a 33 year old male with a past psychiatric history notable for MDD with psychotic features versus schizoaffective disorder and autism spectrum disorder. Multiple prior inpatient psychiatric hospitalizations. Medication nonadherence.  Presented to outside emergency department accompanied by law enforcement after being found disorganized in the community attempting to search for his dog that he lost over 7 years ago. He was subsequently sent to [facility] unit and started on Seroquel 50 mg at bedtime. Patient was subsequently transferred and accepted for inpatient psychiatric hospitalization at [facility] for further safety and further stabilization     Daily Progress: continue to work with OT and  PT .appreciate their recommendations. Okay to utilze scooter on the unit. Increase Seroquel to 300 mg at bedtime.      DIAGNOSTIC FORMULATION   #major depressive disorder with psychotic features  #autism spectrum disorder  #medication nonadherence     PLAN     Location: Unit 5  Legal Status: Orders Placed This Encounter      Legal status Voluntary    Safety Assessment:    Behavioral Orders   Procedures     Code 1 - Restrict to Unit     Routine Programming     As clinically indicated     Status 15     Every 15 minutes.     " Status Individual Observation     Patient SIO status reviewed with team/RN.  Please also refer to RN/team documentation for add'l detail.    -SIO staff to monitor following which have contributed to patient being on SIO:  Syncope, broken right foot, and generalized weakness  -Possible interventions SIO staff could use to support patient's treatment progress:  Gait belt, boot on when up, no weight bearing right foot, wheel chair, and use hallway shower 2-1 staffi ng  -When following observed, team will review discontinuation of SIO:  N/A     Order Specific Question:   CONTINUOUS 24 hours / day     Answer:   5 feet     Order Specific Question:   Indications for SIO     Answer:   Self-injury risk          PTA medications continued/changed:   -none    New medications tried and stopped:   -None    New medications initiated:   -seroquel --> increased to 200 mg at bedtime --250 mg (05/09/23) -> 300 mg at bedtime (5/11/23)    Today's Changes:  Increase Seroquel to 300 mg at bedtime     Programming: Patient will be treated in a therapeutic milieu with appropriate individual and group therapies. Education will be provided on diagnoses, medications, and treatments. Encouraged behavioral activation and participation in group programming.     Medical diagnoses:  Per medicine    #Oblique Fracture through base of the second metatarsal  -pain medication ordered  -ortho to seetoday,  05/8/23    Disposition: pending clinical course , home with services versus IRTS       ATTESTATION    Andrew Monteiro MD  [facility]   Psychiatry    Video Visit: Patient has given verbal consent for video visit?: Yes  Type of Service: video visit for mental health treatment  Reason for Video Visit: COVID-19 [place]  Originating Site (patient location): [facility]  Distant Site (provider location): Remote Location  Mode of Communication: Video Conference via [employer]  Time of Service: Date: 05/11/2023 , Start:  08:30 end: 09:00

## 2023-05-12 NOTE — PROGRESS NOTES
05/12/23 1100   Appointment Info   Signing Clinician's Name / Credentials (OT) Ai MARTINEZ OTR/L   Self-Care/Home Management   Self-Care/Home Mgmt/ADL, Compensatory, Meal Prep Minutes ([id number]) 60   Symptoms Noted During/After Treatment (Meal Preparation/Planning Training) none   Treatment Detail/Skilled Intervention Practiced cupboard reaching, simulated microwave reaching and  reaching. Issued patient a scooter bag to haul items in. Problem solved regarding apartment setup and meals. Patient usually eats at his computer desk which is across his apartment. Patient unable to haul a plate/bowl with scooter at this time and discussed eating at the counter in the kitchen. Patient demonstrated ability to slide bowl along table to simulate moving a bowl/plate down his counter space. Patient has a tub/shower and 1 grab bar. Discussed options about this. Patient not interested in a sponge bath. Discussed a shower bench and patient doesn't think that it would quite work. Discussed a PT home assessment and patient agreeable to this. Clarifying with NP regarding weight bearing recommendation for tub transfer with boot on. Pt. is ok to bear weight with boot on when accessing stairs. Recommend Home Health PT assessment.   OT Discharge Planning   OT Plan OT to continue to assess as needed to modify the environment.   OT Discharge Recommendation (DC Rec) home   OT Rationale for DC Rec per treatment team.   OT Brief overview of current status At this time patient is able to dress self, indep with abby/doff CAM boot. TT from knee scooter is SBA. SBA/CGA with transfer from bed to knee scooter.   Total Session Time   Timed Code Treatment Minutes 60   Total Session Time (sum of timed and untimed services) 60

## 2023-05-13 PROCEDURE — 124N000001 HC R&B MH

## 2023-05-13 PROCEDURE — 250N000013 HC RX MED GY IP 250 OP 250 PS 637: Performed by: NURSE PRACTITIONER

## 2023-05-13 PROCEDURE — 99232 SBSQ HOSP IP/OBS MODERATE 35: CPT | Mod: GT | Performed by: PSYCHIATRY & NEUROLOGY

## 2023-05-13 PROCEDURE — 250N000013 HC RX MED GY IP 250 OP 250 PS 637: Performed by: PSYCHIATRY & NEUROLOGY

## 2023-05-13 RX ADMIN — CALCIUM CARBONATE 600 MG (1,500 MG)-VITAMIN D3 400 UNIT TABLET 1 TABLET: at 17:03

## 2023-05-13 RX ADMIN — MELATONIN 5 MG TABLET 5 MG: at 20:02

## 2023-05-13 RX ADMIN — SENNOSIDES AND DOCUSATE SODIUM 2 TABLET: 8.6; 5 TABLET ORAL at 08:02

## 2023-05-13 RX ADMIN — HYDROXYZINE HYDROCHLORIDE 25 MG: 25 TABLET, FILM COATED ORAL at 19:26

## 2023-05-13 RX ADMIN — POLYETHYLENE GLYCOL 3350 17 G: 17 POWDER, FOR SOLUTION ORAL at 08:02

## 2023-05-13 RX ADMIN — SENNOSIDES AND DOCUSATE SODIUM 2 TABLET: 8.6; 5 TABLET ORAL at 20:00

## 2023-05-13 RX ADMIN — HYDROXYZINE HYDROCHLORIDE 25 MG: 25 TABLET, FILM COATED ORAL at 08:02

## 2023-05-13 RX ADMIN — CALCIUM CARBONATE 600 MG (1,500 MG)-VITAMIN D3 400 UNIT TABLET 1 TABLET: at 08:02

## 2023-05-13 RX ADMIN — QUETIAPINE 300 MG: 100 TABLET ORAL at 20:00

## 2023-05-13 ASSESSMENT — ACTIVITIES OF DAILY LIVING (ADL)
ADLS_ACUITY_SCORE: 52

## 2023-05-13 NOTE — PLAN OF CARE
Shift note 6 am- 1130 pm    Face to face end of shift report received from Clarissa CABELLO RN.  Pt observed in awake in room.        Problem: Adult Behavioral Health Plan of Care  Goal: Patient-Specific Goal (Individualization)  Description: Pt. Will follow recommendations of treatment team during hospital stay.   Pt. Will sleep 6-8 hours a night during hospital stay.  Pt. Will maintain ADLs without prompting during hospital stay.  Pt. Will be free from self harm during hospital stay.   Pt. Will attend > 50% of unit programing during hospital stay.     Pt 1-1 staffing for risk for falls.   Pt is to not have weight on his right foot and must wear the boot when out of bed  Pt to use cold packs on his foot for 20 minutes at a time elevated. Try to do this every other 1 hour when awake  Pt must use a wheel chair when out of bed  Pt will see podiatry on Monday  Pt to have 2-1 staffing to shower in the hallway shower  Pt is to elevate his foot in bed with a bed wedge  Pt must have an Ortho appointment before discharge    Pt needs a skin check of the foot with boot on once per shift  Outcome: Progressing     Problem: Psychotic Signs/Symptoms  Goal: Improved Behavioral Control (Psychotic Signs/Symptoms)  Description: Patient will identify 2 healthy coping skills for when anxious.  Outcome: Progressing     Problem: Suicidal Behavior  Goal: Suicidal Behavior is Absent or Managed  Outcome: Progressing     Problem: Fall Injury Risk  Goal: Absence of Fall and Fall-Related Injury  Description: Patient will use tap bell at bedside for assistance.  Patient will wear non skid footwear.  Outcome: Progressing   Goal Outcome Evaluation:    Plan of Care Reviewed With: patient          Pt is pleasant and cooperative with writer.  Pt continue to c/o high anxiety.  Requested PRN atarax 25 mg at 0802 .  Pt later reported PRN effective.  Pt continues to report ongoing depression.  Denies SI, HI, pain, and hallucinations.  Pt continues to have 1:1  by his side.  Pt is a SBA with transfers and is using a scooter for ambulation.    Pt did not have a fall this shift. Has been in the lounge and has been social with peers. Pt did not attend groups today.   1926- Pt requested PRN atarax 25 mg for anxiety.  2002- Pt requested PRN melatonin 5 mg for sleep.  Pt cooperative with all scheduled medications.  Makes needs known.            Face to face end of shift report communicated to oncoming RN.      Marylou Adrian RN  5/13/2023

## 2023-05-13 NOTE — PROGRESS NOTES
"  [facility] PSYCHIATRY  -  PROGRESS NOTE     ID   Name: Connor Soares  MRN#: [id number]     SUBJECTIVE   Prior to interviewing the patient, I met with nursing and reviewed patient's clinical condition. We discussed clinical care both before and after the interview. I have reviewed the patient's clinical course by review of records including previous notes, labs, and vital signs.     Per nursing, the patient had the following behavioral events over the last 24-hours: utilizing bariatric scooter. Working with PT, slept well overnight.     On psychiatric interview, he is met in milieu.  He is much the same. Continues to sleep well. No complaints.        MEDICATIONS   Scheduled Meds:    calcium carbonate-vitamin D  1 tablet Oral BID w/meals     polyethylene glycol  17 g Oral Daily     QUEtiapine  300 mg Oral At Bedtime     senna-docusate  2 tablet Oral BID     vitamin D2  50,000 Units Oral Q7 Days     PRN Meds:.acetaminophen, albuterol, alum & mag hydroxide-simethicone, artificial saliva, diclofenac, hydrOXYzine, ibuprofen, magnesium hydroxide, melatonin, naloxone **OR** naloxone **OR** naloxone **OR** naloxone, OLANZapine **OR** OLANZapine, ondansetron, QUEtiapine, senna-docusate     ALLERGIES   No Known Allergies     VITALS   Vitals: /85   Pulse 94   Temp 97.8  F (36.6  C) (Tympanic)   Resp 16   Ht 1.829 m (6')   Wt (!) 172.7 kg (380 lb 11.2 oz)   SpO2 96%   BMI 51.63 kg/m       MENTAL STATUS EXAM   Appearance:  awake, alert, adequately groomed, dressed in hospital scrubs, and appeared as age stated  Attitude:  cooperative  Eye Contact:  good  Mood:  \"alright\"  Affect:  mood congruent  Speech:  clear, coherent  Psychomotor Behavior:  no evidence of tardive dyskinesia, dystonia, or tics  Thought Process:  logical, linear, and goal oriented  Associations:  no loose associations  Thought Content:  no evidence of suicidal ideation or homicidal ideation  Insight:  limited  Judgment:  limited  Oriented " to:  time, person, and place  Attention Span and Concentration:  intact  Recent and Remote Memory:  intact  Gait and Station: Normal       LABS   No results found for this or any previous visit (from the past 24 hour(s)).      ASSESSMENT   Connor Soares is a 33 year old male with a past psychiatric history notable for MDD with psychotic features versus schizoaffective disorder and autism spectrum disorder. Multiple prior inpatient psychiatric hospitalizations. Medication nonadherence.  Presented to outside emergency department accompanied by law enforcement after being found disorganized in the community attempting to search for his dog that he lost over 7 years ago. He was subsequently sent to [facility] unit and started on Seroquel 50 mg at bedtime. Patient was subsequently transferred and accepted for inpatient psychiatric hospitalization at [facility] for further safety and further stabilization     Daily Progress: continue to work with OT and  PT .appreciate their recommendations. Okay to utilze scooter on the unit. Maintain seroquel at 300 mg at bedtime , anticipate discharge this upcoming week.      DIAGNOSTIC FORMULATION   #major depressive disorder with psychotic features  #autism spectrum disorder  #medication nonadherence     PLAN     Location: Unit 5  Legal Status: Orders Placed This Encounter      Legal status Voluntary    Safety Assessment:    Behavioral Orders   Procedures     Code 1 - Restrict to Unit     Routine Programming     As clinically indicated     Status 15     Every 15 minutes.     Status Individual Observation     Patient SIO status reviewed with team/RN.  Please also refer to RN/team documentation for add'l detail.    -SIO staff to monitor following which have contributed to patient being on SIO:  Syncope, broken right foot, and generalized weakness  -Possible interventions SIO staff could use to support patient's treatment progress:  Gait  belt, boot on when up, no weight bearing right foot, wheel chair, and use hallway shower 2-1 staffi ng  -When following observed, team will review discontinuation of SIO:  N/A     Order Specific Question:   CONTINUOUS 24 hours / day     Answer:   5 feet     Order Specific Question:   Indications for SIO     Answer:   Self-injury risk          PTA medications continued/changed:   -none    New medications tried and stopped:   -None    New medications initiated:   -seroquel --> increased to 200 mg at bedtime --250 mg (05/09/23) -> 300 mg at bedtime (5/11/23)    Today's Changes:  maintain Seroquel to 300 mg at bedtime     Programming: Patient will be treated in a therapeutic milieu with appropriate individual and group therapies. Education will be provided on diagnoses, medications, and treatments. Encouraged behavioral activation and participation in group programming.     Medical diagnoses:  Per medicine    #Oblique Fracture through base of the second metatarsal  -pain medication ordered  -ortho to seetoday,  05/8/23    Disposition: pending clinical course , home with services versus IRTS       ATTESTATION    Andrew Monteiro MD  [facility]   Psychiatry    Video Visit: Patient has given verbal consent for video visit?: Yes  Type of Service: video visit for mental health treatment  Reason for Video Visit: COVID-19 [place]  Originating Site (patient location): [facility]  Distant Site (provider location): Remote Location  Mode of Communication: Video Conference via [employer]  Time of Service: Date: 05/13/2023 , Start: 08:00 end: 08:30

## 2023-05-13 NOTE — PLAN OF CARE
Face to face end of shift report will be communicated to oncoming RN.     Problem: Adult Behavioral Health Plan of Care  Goal: Patient-Specific Goal (Individualization)  Description: Pt. Will follow recommendations of treatment team during hospital stay.   Pt. Will sleep 6-8 hours a night during hospital stay.  Pt. Will maintain ADLs without prompting during hospital stay.  Pt. Will be free from self harm during hospital stay.   Pt. Will attend > 50% of unit programing during hospital stay.     Pt 1-1 staffing for risk for falls.   Pt is to not have weight on his right foot and must wear the boot when out of bed  Pt to use cold packs on his foot for 20 minutes at a time elevated. Try to do this every other 1 hour when awake  Pt must use a wheel chair when out of bed  Pt will see podiatry on Monday  Pt to have 2-1 staffing to shower in the hallway shower  Pt is to elevate his foot in bed with a bed wedge  Pt must have an Ortho appointment before discharge    Pt needs a skin check of the foot with boot on once per shift  Outcome: Progressing     Problem: Psychotic Signs/Symptoms  Goal: Optimal Cognitive Function (Psychotic Signs/Symptoms)  Outcome: Progressing     Problem: Suicidal Behavior  Goal: Suicidal Behavior is Absent or Managed  Outcome: Progressing     Problem: Fall Injury Risk  Goal: Absence of Fall and Fall-Related Injury  Description: Patient will use tap bell at bedside for assistance.  Patient will wear non skid footwear.  Outcome: Progressing   Face to face end of shift report obtained from MILENA Hickman. Pt observed resting in bed. 1:1 staff with patient. Right foot elevated.  Pt appears to be sleeping in bed with eyes closed. 15 minutes and PRN safety checks completed with no noted complains. No self harm, delusional comments, or falls noticed or reported so far this shift.   0600-Pt appeared to had slept 6.5 hours.   0630-Ice PRN provided. No abnormal discoloration noted on right food. Pt has  sensation and pedal pulse present to right foot.

## 2023-05-14 PROCEDURE — 250N000013 HC RX MED GY IP 250 OP 250 PS 637: Performed by: PSYCHIATRY & NEUROLOGY

## 2023-05-14 PROCEDURE — 250N000013 HC RX MED GY IP 250 OP 250 PS 637: Performed by: NURSE PRACTITIONER

## 2023-05-14 PROCEDURE — 124N000001 HC R&B MH

## 2023-05-14 PROCEDURE — 99232 SBSQ HOSP IP/OBS MODERATE 35: CPT | Mod: GT | Performed by: PSYCHIATRY & NEUROLOGY

## 2023-05-14 RX ADMIN — CALCIUM CARBONATE 600 MG (1,500 MG)-VITAMIN D3 400 UNIT TABLET 1 TABLET: at 09:04

## 2023-05-14 RX ADMIN — ACETAMINOPHEN 325MG 650 MG: 325 TABLET ORAL at 00:27

## 2023-05-14 RX ADMIN — SENNOSIDES AND DOCUSATE SODIUM 2 TABLET: 8.6; 5 TABLET ORAL at 09:04

## 2023-05-14 RX ADMIN — HYDROXYZINE HYDROCHLORIDE 25 MG: 25 TABLET, FILM COATED ORAL at 23:51

## 2023-05-14 RX ADMIN — HYDROXYZINE HYDROCHLORIDE 25 MG: 25 TABLET, FILM COATED ORAL at 09:04

## 2023-05-14 RX ADMIN — MELATONIN 5 MG TABLET 5 MG: at 21:18

## 2023-05-14 RX ADMIN — CALCIUM CARBONATE 600 MG (1,500 MG)-VITAMIN D3 400 UNIT TABLET 1 TABLET: at 16:34

## 2023-05-14 RX ADMIN — ACETAMINOPHEN 325MG 650 MG: 325 TABLET ORAL at 09:05

## 2023-05-14 RX ADMIN — SENNOSIDES AND DOCUSATE SODIUM 2 TABLET: 8.6; 5 TABLET ORAL at 21:13

## 2023-05-14 RX ADMIN — POLYETHYLENE GLYCOL 3350 17 G: 17 POWDER, FOR SOLUTION ORAL at 09:16

## 2023-05-14 RX ADMIN — QUETIAPINE 300 MG: 100 TABLET ORAL at 21:13

## 2023-05-14 ASSESSMENT — ACTIVITIES OF DAILY LIVING (ADL)
ADLS_ACUITY_SCORE: 52
ADLS_ACUITY_SCORE: 52
LAUNDRY: UNABLE TO COMPLETE
HYGIENE/GROOMING: WITH ASSISTANCE
ADLS_ACUITY_SCORE: 52
DRESS: WITH ASSISTANCE
ADLS_ACUITY_SCORE: 52
ORAL_HYGIENE: WITH ASSISTANCE
ADLS_ACUITY_SCORE: 52

## 2023-05-14 NOTE — PLAN OF CARE
Problem: Adult Behavioral Health Plan of Care  Goal: Patient-Specific Goal (Individualization)  Description: Pt. Will follow recommendations of treatment team during hospital stay.   Pt. Will sleep 6-8 hours a night during hospital stay.  Pt. Will maintain ADLs without prompting during hospital stay.  Pt. Will be free from self harm during hospital stay.   Pt. Will attend > 50% of unit programing during hospital stay.     Pt 1-1 staffing for risk for falls.   Pt is to not have weight on his right foot and must wear the boot when out of bed  Pt to use cold packs on his foot for 20 minutes at a time elevated. Try to do this every other 1 hour when awake  Pt must use a wheel chair when out of bed  Pt will see podiatry on Monday  Pt to have 2-1 staffing to shower in the hallway shower  Pt is to elevate his foot in bed with a bed wedge  Pt must have an Ortho appointment before discharge    Pt needs a skin check of the foot with boot on once per shift  Outcome: Progressing  Note: 07:40:  Received end of shift report from MILENA Romo-- 1:1 staffing  09:20: PRN acetaminophen 650 mg given with AM medication administration; Thought process is linear, relevant. Appropiate level of safety awareness--      Questioning location of car et phone---    14:30: Participates in AM groups, PRN hydroxyzine given x1 this AM shift, bright affect, calm. Appropriate behavior with staff et peers. Pt independent with set-up et supervision ADLs; excellent food et fluid intake. CMS intact.     Face to face end of shift report communicated to on-coming PM staff.     Radha Campbell RN  5/14/2023  3:15 PM             Problem: Psychotic Signs/Symptoms  Goal: Improved Behavioral Control (Psychotic Signs/Symptoms)  Description: Patient will identify 2 healthy coping skills for when anxious.  Outcome: Progressing     Problem: Suicidal Behavior  Goal: Suicidal Behavior is Absent or Managed  Outcome: Progressing     Problem: Fall Injury Risk  Goal:  Absence of Fall and Fall-Related Injury  Description: Patient will use tap bell at bedside for assistance.  Patient will wear non skid footwear.  Outcome: Progressing   Goal Outcome Evaluation:

## 2023-05-14 NOTE — PLAN OF CARE
Problem: Adult Behavioral Health Plan of Care  Goal: Patient-Specific Goal (Individualization)  Description: Pt. Will follow recommendations of treatment team during hospital stay.   Pt. Will sleep 6-8 hours a night during hospital stay.  Pt. Will maintain ADLs without prompting during hospital stay.  Pt. Will be free from self harm during hospital stay.   Pt. Will attend > 50% of unit programing during hospital stay.     Pt 1-1 staffing for risk for falls.   Pt is to not have weight on his right foot and must wear the boot when out of bed  Pt to use cold packs on his foot for 20 minutes at a time elevated. Try to do this every other 1 hour when awake  Pt must use a wheel chair when out of bed  Pt will see podiatry on Monday  Pt to have 2-1 staffing to shower in the hallway shower  Pt is to elevate his foot in bed with a bed wedge  Pt must have an Ortho appointment before discharge    Pt needs a skin check of the foot with boot on once per shift    Patient is pleasant and cooperative. He is bright and engaging. Affect is full range, mood is calm. He denies SI, HI, anxiety, depression, hallucinations, and pain. States he is good. He is social with his peers and attends groups. He is ambulating steadily with use of his knee scooter, he also has a 1:1 staff with him at all times. Has been appropriate with staff and peers.   Patient showered.   Skin is intact on right foot, bruises noted on top and bottom of foot. Patient was in bed with it elevated, some swelling noted.   2118-requested and accepted Melatonin 5 mg for sleep. States he is sleeping more each night.   Face to face end of shift report communicated to oncoming RN.     Nella Vences RN  5/14/2023  10:16 PM    Outcome: Progressing     Problem: Psychotic Signs/Symptoms  Goal: Improved Behavioral Control (Psychotic Signs/Symptoms)  Description: Patient will identify 2 healthy coping skills for when anxious.  Outcome: Progressing     Problem: Suicidal  Behavior  Goal: Suicidal Behavior is Absent or Managed  Outcome: Progressing     Problem: Fall Injury Risk  Goal: Absence of Fall and Fall-Related Injury  Description: Patient will use tap bell at bedside for assistance.  Patient will wear non skid footwear.  Outcome: Progressing   Goal Outcome Evaluation:    Plan of Care Reviewed With: patient

## 2023-05-14 NOTE — PROGRESS NOTES
"  [facility] PSYCHIATRY  -  PROGRESS NOTE     ID   Name: Connor Soares  MRN#: [id number]     SUBJECTIVE   Prior to interviewing the patient, I met with nursing and reviewed patient's clinical condition. We discussed clinical care both before and after the interview. I have reviewed the patient's clinical course by review of records including previous notes, labs, and vital signs.     Per nursing, the patient had the following behavioral events over the last 24-hours: utilizing bariatric scooter. Working with PT, slept well overnight.     On psychiatric interview, he is met in his room. He states he is a little bummed about it being mothers day and not having the greatest relationship with his mother. He states he is enjoying groups and learning more about \"passive aggressiveness\". He states that he is tolerating Seroquel well. Reports he is sleeping well. Reports he is handling the scooter well - learning how to ambulate with it and today utilized the scooter to use the bathroom independently. He states he feels he will be ready for discharge on Tuesday or Wednesday.        MEDICATIONS   Scheduled Meds:    calcium carbonate-vitamin D  1 tablet Oral BID w/meals     polyethylene glycol  17 g Oral Daily     QUEtiapine  300 mg Oral At Bedtime     senna-docusate  2 tablet Oral BID     vitamin D2  50,000 Units Oral Q7 Days     PRN Meds:.acetaminophen, albuterol, alum & mag hydroxide-simethicone, artificial saliva, diclofenac, hydrOXYzine, ibuprofen, magnesium hydroxide, melatonin, naloxone **OR** naloxone **OR** naloxone **OR** naloxone, OLANZapine **OR** OLANZapine, ondansetron, QUEtiapine, senna-docusate     ALLERGIES   No Known Allergies     VITALS   Vitals: /82   Pulse 92   Temp 97.7  F (36.5  C) (Tympanic)   Resp 16   Ht 1.829 m (6')   Wt (!) 172.7 kg (380 lb 11.2 oz)   SpO2 96%   BMI 51.63 kg/m       MENTAL STATUS EXAM   Appearance:  awake, alert, adequately groomed, dressed in hospital scrubs, " "and appeared as age stated  Attitude:  cooperative  Eye Contact:  good  Mood:  \"alright\"  Affect:  mood congruent  Speech:  clear, coherent  Psychomotor Behavior:  no evidence of tardive dyskinesia, dystonia, or tics  Thought Process:  logical, linear, and goal oriented  Associations:  no loose associations  Thought Content:  no evidence of suicidal ideation or homicidal ideation  Insight:  limited  Judgment:  limited  Oriented to:  time, person, and place  Attention Span and Concentration:  intact  Recent and Remote Memory:  intact  Gait and Station: Normal       LABS   No results found for this or any previous visit (from the past 24 hour(s)).      ASSESSMENT   Connor Soares is a 33 year old male with a past psychiatric history notable for MDD with psychotic features versus schizoaffective disorder and autism spectrum disorder. Multiple prior inpatient psychiatric hospitalizations. Medication nonadherence.  Presented to outside emergency department accompanied by law enforcement after being found disorganized in the community attempting to search for his dog that he lost over 7 years ago. He was subsequently sent to [facility] unit and started on Seroquel 50 mg at bedtime. Patient was subsequently transferred and accepted for inpatient psychiatric hospitalization at [facility] for further safety and further stabilization     Daily Progress: continue to work with OT and  PT .appreciate their recommendations. Okay to utilze scooter on the unit. Maintain seroquel at 300 mg at bedtime , continues to attend groups,  anticipate discharge this upcoming week.      DIAGNOSTIC FORMULATION   #major depressive disorder with psychotic features  #autism spectrum disorder  #medication nonadherence     PLAN     Location: Unit 5  Legal Status: Orders Placed This Encounter      Legal status Voluntary    Safety Assessment:    Behavioral Orders   Procedures     Code 1 - Restrict to Unit "     Routine Programming     As clinically indicated     Status 15     Every 15 minutes.     Status Individual Observation     Patient SIO status reviewed with team/RN.  Please also refer to RN/team documentation for add'l detail.    -SIO staff to monitor following which have contributed to patient being on SIO:  Syncope, broken right foot, and generalized weakness  -Possible interventions SIO staff could use to support patient's treatment progress:  Gait belt, boot on when up, no weight bearing right foot, wheel chair, and use hallway shower 2-1 staffi ng  -When following observed, team will review discontinuation of SIO:  N/A     Order Specific Question:   CONTINUOUS 24 hours / day     Answer:   5 feet     Order Specific Question:   Indications for SIO     Answer:   Self-injury risk          PTA medications continued/changed:   -none    New medications tried and stopped:   -None    New medications initiated:   -seroquel --> increased to 200 mg at bedtime --250 mg (05/09/23) -> 300 mg at bedtime (5/11/23)    Today's Changes:  maintain Seroquel to 300 mg at bedtime     Programming: Patient will be treated in a therapeutic milieu with appropriate individual and group therapies. Education will be provided on diagnoses, medications, and treatments. Encouraged behavioral activation and participation in group programming.     Medical diagnoses:  Per medicine    #Oblique Fracture through base of the second metatarsal  -pain medication ordered  -ortho to seetoday,  05/8/23    Disposition: pending clinical course , home with services versus IRTS       ATTESTATION    Andrew Monteiro MD  [facility]   Psychiatry    Video Visit: Patient has given verbal consent for video visit?: Yes  Type of Service: video visit for mental health treatment  Reason for Video Visit: COVID-19 [place]  Originating Site (patient location): [facility]  Distant Site (provider location): Remote Location  Mode of  Communication: Video Conference via [employer]  Time of Service: Date: 05/14/2023 , Start:10:30 end: 11:00

## 2023-05-14 NOTE — PLAN OF CARE
Face to face end of shift report will be communicated to oncoming RN.     Problem: Adult Behavioral Health Plan of Care  Goal: Patient-Specific Goal (Individualization)  Description: Pt. Will follow recommendations of treatment team during hospital stay.   Pt. Will sleep 6-8 hours a night during hospital stay.  Pt. Will maintain ADLs without prompting during hospital stay.  Pt. Will be free from self harm during hospital stay.   Pt. Will attend > 50% of unit programing during hospital stay.     Pt 1-1 staffing for risk for falls.   Pt is to not have weight on his right foot and must wear the boot when out of bed  Pt to use cold packs on his foot for 20 minutes at a time elevated. Try to do this every other 1 hour when awake  Pt must use a wheel chair when out of bed  Pt will see podiatry on Monday  Pt to have 2-1 staffing to shower in the hallway shower  Pt is to elevate his foot in bed with a bed wedge  Pt must have an Ortho appointment before discharge    Pt needs a skin check of the foot with boot on once per shift  Outcome: Progressing     Problem: Psychotic Signs/Symptoms  Goal: Optimal Cognitive Function (Psychotic Signs/Symptoms)  Outcome: Progressing     Problem: Suicidal Behavior  Goal: Suicidal Behavior is Absent or Managed  Outcome: Progressing     Problem: Fall Injury Risk  Goal: Absence of Fall and Fall-Related Injury  Description: Patient will use tap bell at bedside for assistance.  Patient will wear non skid footwear.  Outcome: Progressing   Face to face end of shift report obtained from MILENA Hickman. Pt observed resting in bed. 1:1 staff with patient. Right foot elevated.  Pt appears to be sleeping in bed with eyes closed. 15 minutes and PRN safety checks completed with no noted complains. No self harm, delusional comments, or falls noticed or reported so far this shift.   0027-Pt c/o 4/10 right foot pain. Acetaminophen 650 mg given. Ice offered, pt declined at this time.  0120-Pt appears to be  sleeping.   0630-Pt appeared to had slept 6.5 hours.

## 2023-05-15 ENCOUNTER — APPOINTMENT (OUTPATIENT)
Dept: OCCUPATIONAL THERAPY | Facility: HOSPITAL | Age: 33
DRG: 885 | End: 2023-05-15
Attending: STUDENT IN AN ORGANIZED HEALTH CARE EDUCATION/TRAINING PROGRAM
Payer: MEDICARE

## 2023-05-15 PROCEDURE — 97535 SELF CARE MNGMENT TRAINING: CPT | Mod: GO

## 2023-05-15 PROCEDURE — 124N000001 HC R&B MH

## 2023-05-15 PROCEDURE — 99239 HOSP IP/OBS DSCHRG MGMT >30: CPT | Mod: GT | Performed by: STUDENT IN AN ORGANIZED HEALTH CARE EDUCATION/TRAINING PROGRAM

## 2023-05-15 PROCEDURE — 250N000013 HC RX MED GY IP 250 OP 250 PS 637: Performed by: NURSE PRACTITIONER

## 2023-05-15 PROCEDURE — 250N000013 HC RX MED GY IP 250 OP 250 PS 637: Performed by: PSYCHIATRY & NEUROLOGY

## 2023-05-15 RX ORDER — HYDROXYZINE HYDROCHLORIDE 25 MG/1
25 TABLET, FILM COATED ORAL EVERY 6 HOURS PRN
Qty: 90 TABLET | Refills: 1 | Status: ON HOLD | OUTPATIENT
Start: 2023-05-15 | End: 2023-12-27

## 2023-05-15 RX ORDER — ERGOCALCIFEROL 1.25 MG/1
50000 CAPSULE, LIQUID FILLED ORAL
Qty: 8 CAPSULE | Refills: 0 | Status: ON HOLD | OUTPATIENT
Start: 2023-05-16 | End: 2024-01-03

## 2023-05-15 RX ORDER — QUETIAPINE FUMARATE 300 MG/1
300 TABLET, FILM COATED ORAL AT BEDTIME
Qty: 30 TABLET | Refills: 1 | Status: ON HOLD | OUTPATIENT
Start: 2023-05-15 | End: 2023-12-27

## 2023-05-15 RX ORDER — ALBUTEROL SULFATE 90 UG/1
2 AEROSOL, METERED RESPIRATORY (INHALATION) 4 TIMES DAILY PRN
Qty: 18 G | Refills: 0 | Status: ON HOLD | OUTPATIENT
Start: 2023-05-15 | End: 2024-01-25

## 2023-05-15 RX ORDER — AMOXICILLIN 250 MG
2 CAPSULE ORAL 2 TIMES DAILY
Qty: 120 TABLET | Refills: 1 | Status: ON HOLD | OUTPATIENT
Start: 2023-05-15 | End: 2024-01-25

## 2023-05-15 RX ADMIN — SENNOSIDES AND DOCUSATE SODIUM 2 TABLET: 8.6; 5 TABLET ORAL at 20:14

## 2023-05-15 RX ADMIN — ACETAMINOPHEN 325MG 650 MG: 325 TABLET ORAL at 18:25

## 2023-05-15 RX ADMIN — POLYETHYLENE GLYCOL 3350 17 G: 17 POWDER, FOR SOLUTION ORAL at 08:07

## 2023-05-15 RX ADMIN — CALCIUM CARBONATE 600 MG (1,500 MG)-VITAMIN D3 400 UNIT TABLET 1 TABLET: at 17:04

## 2023-05-15 RX ADMIN — MELATONIN 5 MG TABLET 5 MG: at 20:15

## 2023-05-15 RX ADMIN — CALCIUM CARBONATE 600 MG (1,500 MG)-VITAMIN D3 400 UNIT TABLET 1 TABLET: at 08:03

## 2023-05-15 RX ADMIN — HYDROXYZINE HYDROCHLORIDE 25 MG: 25 TABLET, FILM COATED ORAL at 13:01

## 2023-05-15 RX ADMIN — HYDROXYZINE HYDROCHLORIDE 25 MG: 25 TABLET, FILM COATED ORAL at 08:03

## 2023-05-15 RX ADMIN — HYDROXYZINE HYDROCHLORIDE 25 MG: 25 TABLET, FILM COATED ORAL at 20:13

## 2023-05-15 RX ADMIN — SENNOSIDES AND DOCUSATE SODIUM 2 TABLET: 8.6; 5 TABLET ORAL at 08:03

## 2023-05-15 RX ADMIN — IBUPROFEN 600 MG: 600 TABLET ORAL at 18:25

## 2023-05-15 RX ADMIN — QUETIAPINE 300 MG: 100 TABLET ORAL at 20:13

## 2023-05-15 ASSESSMENT — ACTIVITIES OF DAILY LIVING (ADL)
ADLS_ACUITY_SCORE: 32
DRESS: INDEPENDENT;SCRUBS (BEHAVIORAL HEALTH)
ADLS_ACUITY_SCORE: 52
ADLS_ACUITY_SCORE: 32
LAUNDRY: UNABLE TO COMPLETE
ADLS_ACUITY_SCORE: 32
ADLS_ACUITY_SCORE: 52
ADLS_ACUITY_SCORE: 32
HYGIENE/GROOMING: INDEPENDENT
ADLS_ACUITY_SCORE: 32
ADLS_ACUITY_SCORE: 32
ORAL_HYGIENE: INDEPENDENT
ADLS_ACUITY_SCORE: 52
ADLS_ACUITY_SCORE: 52
ADLS_ACUITY_SCORE: 32
ADLS_ACUITY_SCORE: 32

## 2023-05-15 NOTE — PROGRESS NOTES
"    The SW spoke to the patient. SW asked for the patient's phone number.   The patient said, \" I do not have a phone number. I bought a phone at [employer] and lost it before I could activate it. \"     SW explained to the patient that he will need a point of contact for his follow up appointments.     SW asked the patient if it was ok with him to provide his 's contact. The patient stated that it was ok to give his 's phone number.     SW also asked what his plan was to get a hold of his providers and . The patient said, \" I was kind of thinking I could get some kind of [employer] thing.\"     KELLY called the patient's  Julito. She stated it was ok to give her phone number to out patient providers. Julito also stated she has scheduled a meeting with the patient for this Friday to meet in person.           Case Management -Julito Theodore    [phone]   Fax: [phone]    "

## 2023-05-15 NOTE — PROGRESS NOTES
SW submitted Referral documents to Fax: [phone] for [facility] for PT services. SW included the patient's 's contact for scheduling PT services.     [facility]  [address], [place] [address]   Ph: [phone]  Fax: [phone]    SW called [facility] and they confirmed they received the fax. KELLY explained that the patient does not have his own phone number, and that appointments need to be scheduled with his .

## 2023-05-15 NOTE — PLAN OF CARE
Shift note for 7am -1130pm.     Face to face end of shift report received from Clarissa CABELLO RN.  Pt observed awake in room.       Problem: Adult Behavioral Health Plan of Care  Goal: Patient-Specific Goal (Individualization)  Description: Pt. Will follow recommendations of treatment team during hospital stay.   Pt. Will sleep 6-8 hours a night during hospital stay.  Pt. Will maintain ADLs without prompting during hospital stay.  Pt. Will be free from self harm during hospital stay.   Pt. Will attend > 50% of unit programing during hospital stay.     Pt 1-1 staffing for risk for falls.   Pt is to not have weight on his right foot and must wear the boot when out of bed  Pt to use cold packs on his foot for 20 minutes at a time elevated. Try to do this every other 1 hour when awake  Pt must use a wheel chair when out of bed  Pt will see podiatry on Monday  Pt to have 2-1 staffing to shower in the hallway shower  Pt is to elevate his foot in bed with a bed wedge  Pt must have an Ortho appointment before discharge    Pt needs a skin check of the foot with boot on once per shift  Outcome: Progressing     Problem: Psychotic Signs/Symptoms  Goal: Improved Behavioral Control (Psychotic Signs/Symptoms)  Description: Patient will identify 2 healthy coping skills for when anxious.  Outcome: Progressing     Problem: Suicidal Behavior  Goal: Suicidal Behavior is Absent or Managed  Outcome: Progressing     Problem: Fall Injury Risk  Goal: Absence of Fall and Fall-Related Injury  Description: Patient will use tap bell at bedside for assistance.  Patient will wear non skid footwear.  Outcome: Progressing     Problem: Adult Behavioral Health Plan of Care  Goal: Plan of Care Review  Recent Flowsheet Documentation  Taken 5/15/2023 0800 by Marylou Adrian, RN  Patient Agreement with Plan of Care: agrees   Goal Outcome Evaluation:    Plan of Care Reviewed With: patient             Pt is pleasant and cooperative with writer.  Pt continue  to c/o high anxiety.  Requested PRN atarax 25 mg at 0803 .  Pt later reported PRN effective.  Pt continues to report ongoing depression.  Denies SI, HI, pain, and hallucinations. Is hopeful for discharge soon. Pt continues to have 1:1 by his side.  Pt is a SBA with transfers and is using a scooter for ambulation.  Pt did not have a fall this shift.  1825- PT c/o 6/10 pain and requested PRN tylenol 650 mg and IBU at this time.  Pt later reported his pain was gone.  2015- Pt requested PRN melatonin 5 mg and atarax 25 mg for sleep and anxiety.  Pt was cooperative with all medications.  Attended some groups this today. Is looking forward to discharging tomorrow.  Makes needs known.      Face to face end of shift report communicated to oncoming RN.      Marylou Adrian RN  5/15/2023

## 2023-05-15 NOTE — PROGRESS NOTES
KELLY spoke to the patient to verify address. The address was wrong in [facility].     The patient also asked for the [place] [employer] phone numbers.       SW corrected the patient's address in [facility] and provided the patient with the [employer] phone numbers.       KELLY called [employer]. [facility] will pick the patient up tomorrow for discharge between 9 am and 10 am. Provider and Nursing updated.

## 2023-05-15 NOTE — PLAN OF CARE
Physical Therapy Discharge Summary    Reason for therapy discharge:    Discharged to home with home therapy.    Progress towards therapy goal(s). [facility] for goal details.  Goals met    Therapy recommendation(s):    Continued therapy is recommended.  Rationale/Recommendations:  home PT for home safety assessment.  Pt issued bariatric knee scooter for discharge.  Has been utilizing with no difficulties and has no questions/concerns about returning home at this time.  No further PT indicated, pt planning to discharge tomorrow..    Goal Outcome Evaluation:

## 2023-05-15 NOTE — PROGRESS NOTES
OT met with patient to discuss a few options for home health referral. Patient decided to have referral sent to [facility], phone [phone] fax [phone]. Patient is wondering about being dropped off at the impound lot where his car is at, OT didn't have an answer for patient. Discussed different driving strategies with patient if he is planning on doing this.  Patient wondering about a backpack to haul things in. OT is working on locating a backpack for patient. Met with KELLY regarding home health referral. SW had printed notes and order and was going to fax referral to above home health company.    Patient/Caregiver provided printed discharge information.

## 2023-05-15 NOTE — PLAN OF CARE
Face to face end of shift report will be communicated to oncoming RN.     Problem: Adult Behavioral Health Plan of Care  Goal: Patient-Specific Goal (Individualization)  Description: Pt. Will follow recommendations of treatment team during hospital stay.   Pt. Will sleep 6-8 hours a night during hospital stay.  Pt. Will maintain ADLs without prompting during hospital stay.  Pt. Will be free from self harm during hospital stay.   Pt. Will attend > 50% of unit programing during hospital stay.     Pt 1-1 staffing for risk for falls.   Pt is to not have weight on his right foot and must wear the boot when out of bed  Pt to use cold packs on his foot for 20 minutes at a time elevated. Try to do this every other 1 hour when awake  Pt must use a wheel chair when out of bed  Pt will see podiatry on Monday  Pt to have 2-1 staffing to shower in the hallway shower  Pt is to elevate his foot in bed with a bed wedge  Pt must have an Ortho appointment before discharge    Pt needs a skin check of the foot with boot on once per shift  Outcome: Progressing     Problem: Psychotic Signs/Symptoms  Goal: Optimal Cognitive Function (Psychotic Signs/Symptoms)  Outcome: Progressing     Problem: Suicidal Behavior  Goal: Suicidal Behavior is Absent or Managed  Outcome: Progressing     Problem: Fall Injury Risk  Goal: Absence of Fall and Fall-Related Injury  Description: Patient will use tap bell at bedside for assistance.  Patient will wear non skid footwear.  Outcome: Progressing   Goal Outcome Evaluation:  Face to face end of shift report obtained from MILENA Carmen. Pt observed resting in bed. 1:1 staff with patient.  2351-Pt c/o inability to sleep and anxiety. Atarax 25 mg PRN and sound machine provided. Right foot elevated.  0050-Pt appears to be sleeping in bed with eyes closed. 15 minutes and PRN safety checks completed. No self harm, falls, or delusional comments noted or reported so far this shift.   0600-Pt appeared to had slept 6.5  hours.

## 2023-05-16 VITALS
WEIGHT: 315 LBS | RESPIRATION RATE: 16 BRPM | OXYGEN SATURATION: 99 % | SYSTOLIC BLOOD PRESSURE: 147 MMHG | DIASTOLIC BLOOD PRESSURE: 92 MMHG | TEMPERATURE: 97.3 F | HEIGHT: 72 IN | BODY MASS INDEX: 42.66 KG/M2 | HEART RATE: 97 BPM

## 2023-05-16 PROCEDURE — 250N000013 HC RX MED GY IP 250 OP 250 PS 637: Performed by: NURSE PRACTITIONER

## 2023-05-16 RX ADMIN — ERGOCALCIFEROL 50000 UNITS: 1.25 CAPSULE, LIQUID FILLED ORAL at 08:04

## 2023-05-16 RX ADMIN — HYDROXYZINE HYDROCHLORIDE 25 MG: 25 TABLET, FILM COATED ORAL at 08:04

## 2023-05-16 RX ADMIN — CALCIUM CARBONATE 600 MG (1,500 MG)-VITAMIN D3 400 UNIT TABLET 1 TABLET: at 08:04

## 2023-05-16 ASSESSMENT — ACTIVITIES OF DAILY LIVING (ADL)
LAUNDRY: UNABLE TO COMPLETE
ADLS_ACUITY_SCORE: 32
DRESS: INDEPENDENT;SCRUBS (BEHAVIORAL HEALTH)
ADLS_ACUITY_SCORE: 32
ADLS_ACUITY_SCORE: 32
HYGIENE/GROOMING: INDEPENDENT
ORAL_HYGIENE: INDEPENDENT
ADLS_ACUITY_SCORE: 32

## 2023-05-16 NOTE — PROGRESS NOTES
Occupational Therapy Discharge Summary    Reason for therapy discharge:    Discharged to home.  All goals and outcomes met, no further needs identified.    Progress towards therapy goal(s). See goals on Care Plan in [facility] electronic health record for goal details.  Goals met    Therapy recommendation(s):    in home PT referral made

## 2023-05-16 NOTE — PLAN OF CARE
Problem: Adult Behavioral Health Plan of Care  Goal: Patient-Specific Goal (Individualization)  Description: Pt. Will follow recommendations of treatment team during hospital stay.   Pt. Will sleep 6-8 hours a night during hospital stay.  Pt. Will maintain ADLs without prompting during hospital stay.  Pt. Will be free from self harm during hospital stay.   Pt. Will attend > 50% of unit programing during hospital stay.     Pt 1-1 staffing for risk for falls.   Pt is to not have weight on his right foot and must wear the boot when out of bed  Pt to use cold packs on his foot for 20 minutes at a time elevated. Try to do this every other 1 hour when awake  Pt must use a wheel chair when out of bed  Pt will see podiatry on Monday  Pt to have 2-1 staffing to shower in the hallway shower  Pt is to elevate his foot in bed with a bed wedge  Pt must have an Ortho appointment before discharge    Pt needs a skin check of the foot with boot on once per shift  Outcome: Progressing     Problem: Fall Injury Risk  Goal: Absence of Fall and Fall-Related Injury  Description: Patient will use tap bell at bedside for assistance.  Patient will wear non skid footwear.  Outcome: Progressing     Face to face shift report received from Marylou ABBOTT. Rounding completed, pt observed.     Pt appeared to sleep most of this shift. Pt did not have any noted falls this shift. 1:1 in place throughout shift for safety.    Face to face report will be communicated to oncoming RN.    Adriana Zapata RN  5/16/2023  5:44 AM

## 2023-05-16 NOTE — PROGRESS NOTES
Pt is discharging at the recommendation of the treatment team. Pt is discharging to his own apartment transported by [employer]. Pt denies having any thoughts of hurting themself or anyone else. Pt denies anxiety or depression. Pt has follow up with [facility] for Medication Management, and post hospital follow up with referral to PHP/ Individual Therapy,  Yeng with [facility], and a referral was submitted to [facility] for PT in home services. The patient's 's contact was provided to outpatient providers as the patient does not have a phone currently - permission was give by both patient and  to do so . Discharge instructions, including; demographic sheet, psychiatric evaluation, discharge summary, and AVS were faxed to these next level of care providers.

## 2023-05-16 NOTE — PLAN OF CARE
Discharge Note    Patient Discharged to home on 5/16/2023  AM 10 [facility] [employer] accompanied by  staff.     Patient informed of discharge instructions in AVS. patient verbalizes understanding and denies having any questions pertaining to AVS. Patient stable at time of discharge. Patient denies SI, HI, and thoughts of self harm at time of discharge. All personal belongings returned to patient. Discharge prescriptions sent to [facility]  via electronic communication. Psych evaluation, history and physical, AVS, and discharge summary faxed to next level of care.     Marylou Adrian RN  5/16/2023          Face to face end of shift report received from Adriana PEACOCK RN.  Pt observed awake in room.       Problem: Adult Behavioral Health Plan of Care  Goal: Patient-Specific Goal (Individualization)  Description: Pt. Will follow recommendations of treatment team during hospital stay.   Pt. Will sleep 6-8 hours a night during hospital stay.  Pt. Will maintain ADLs without prompting during hospital stay.  Pt. Will be free from self harm during hospital stay.   Pt. Will attend > 50% of unit programing during hospital stay.     Pt 1-1 staffing for risk for falls.   Pt is to not have weight on his right foot and must wear the boot when out of bed  Pt to use cold packs on his foot for 20 minutes at a time elevated. Try to do this every other 1 hour when awake  Pt must use a wheel chair when out of bed  Pt will see podiatry on Monday  Pt to have 2-1 staffing to shower in the hallway shower  Pt is to elevate his foot in bed with a bed wedge  Pt must have an Ortho appointment before discharge    Pt needs a skin check of the foot with boot on once per shift  Outcome: Progressing     Problem: Psychotic Signs/Symptoms  Goal: Improved Behavioral Control (Psychotic Signs/Symptoms)  Description: Patient will identify 2 healthy coping skills for when anxious.  Outcome: Progressing     Problem: Suicidal Behavior  Goal: Suicidal  Behavior is Absent or Managed  Outcome: Progressing     Problem: Fall Injury Risk  Goal: Absence of Fall and Fall-Related Injury  Description: Patient will use tap bell at bedside for assistance.  Patient will wear non skid footwear.  Outcome: Progressing   Goal Outcome Evaluation:    Plan of Care Reviewed With: patient             Pt is pleasant and cooperative with writer.  He is looking forward to discharging today  Pt continue to c/o high anxiety.  Requested PRN atarax 25 mg at 0804 .  Pt later reported PRN effective.  Pt continues to report ongoing depression.  Denies SI, HI, pain, and hallucinationsPt continues to have 1:1 by his side.  Pt is a SBA with transfers and is using a scooter for ambulation.  Pt did not have a fall this shift. Makes needs known.

## 2023-05-17 ENCOUNTER — TELEPHONE (OUTPATIENT)
Dept: BEHAVIORAL HEALTH | Facility: HOSPITAL | Age: 33
End: 2023-05-17

## 2023-05-17 NOTE — TELEPHONE ENCOUNTER
He was discharged to home on  5/16/23 from [facility]. I called today regarding the patient's discharge.    No answer was received. Unable to leave a message.

## 2023-05-17 NOTE — PROGRESS NOTES
"      Received message that Keli from [facility] called and they can not accommodate PT services for the patient.     KELLY submitted another PT referral to [facility] services in the [facility] : Phone: [phone] and Fax: [phone].     KELLY emailed the patient's  Julito and updated her of the PT issue. Julito responded \" Thank you for the update. \"  Julito will follow up with referral.     :     Julito Richey    Targeted Case Management  [facility]  Direct: [phone]  Fax: [phone]  [email or web address]  [address] [address]          "

## 2023-09-11 NOTE — PLAN OF CARE
Problem: Fall Injury Risk  Goal: Absence of Fall and Fall-Related Injury  Description: Patient will use tap bell at bedside for assistance.  Patient will wear non skid footwear.  Outcome: Progressing    Pt was up with 1-1 staff, a gait belt, and wheel chair     Problem: Suicidal Behavior  Goal: Suicidal Behavior is Absent or Managed  Outcome: Progressing    Pt denies SI     Problem: Psychotic Signs/Symptoms  Goal: Improved Behavioral Control (Psychotic Signs/Symptoms)  Description: Patient will identify 2 healthy coping skills for when anxious.  Outcome: Progressing    Pt's thinking is less paranoid though he feels depressed and anxious     Problem: Adult Behavioral Health Plan of Care  Goal: Patient-Specific Goal (Individualization)  Description: Pt. Will follow recommendations of treatment team during hospital stay.   Pt. Will sleep 6-8 hours a night during hospital stay.  Pt. Will maintain ADLs without prompting during hospital stay.  Pt. Will be free from self harm during hospital stay.   Pt. Will attend > 50% of unit programing during hospital stay.     Pt 1-1 staffing for risk for falls.   Pt is to not have weight on his right foot and must wear the boot when out of bed  Pt to use cold pack on his foot for 20 minutes at a time. Try to do this every 1 hour when awake  Pt must use a wheel chair when out of bed  Pt will see podiatry on Monday  Pt to have 2-1 staffing to shower in the hallway shower  Pt is to elevate his foot in bed with a bed wedge  Pt must have an Ortho appointment before discharge    Pt needs a skin check of the foot with boot on once per shift  Outcome: Progressing   Goal Outcome Evaluation:    Plan of Care Reviewed With: patient      0730 Face to face rounding complete.  Pt introduced to nursing for the shift.      Pt has had some significant pain and swelling in his foot and lower leg today.  He was set up with the foot of he bed elevated and iced the foot above and below with pillow cases.  He had Tylenol twice and Ibuprofen once this shift.  He was given Roxicodone this afternoon.  The swelling did not go down much even with elevation and ice.  He was given teaching about safety and preventing falls while in the hospital.  He was up with his 1-1 staff using the wheel chair and his boot for meals. He plans to shower this evening.    1500 Face to face end of shift report communicated to Evening Shift RN's along with Pt's fall risk.     Zheng Wilson RN  5/6/2023  2:32 PM       no lymph node enlargement

## 2023-12-07 ENCOUNTER — HOSPITAL ENCOUNTER (INPATIENT)
Facility: CLINIC | Age: 33
LOS: 17 days | Discharge: HOME | DRG: 885 | End: 2023-12-27
Attending: PSYCHIATRY & NEUROLOGY | Admitting: PSYCHIATRY & NEUROLOGY
Payer: MEDICARE

## 2023-12-07 DIAGNOSIS — F32.A DEPRESSION, UNSPECIFIED DEPRESSION TYPE: ICD-10-CM

## 2023-12-07 DIAGNOSIS — F33.3 SEVERE EPISODE OF RECURRENT MAJOR DEPRESSIVE DISORDER, WITH PSYCHOTIC FEATURES (H): ICD-10-CM

## 2023-12-07 DIAGNOSIS — K59.00 CONSTIPATION, UNSPECIFIED CONSTIPATION TYPE: Primary | ICD-10-CM

## 2023-12-07 DIAGNOSIS — F84.0 AUTISM: ICD-10-CM

## 2023-12-07 DIAGNOSIS — F25.0 SCHIZOAFFECTIVE DISORDER, BIPOLAR TYPE (H): ICD-10-CM

## 2023-12-07 PROCEDURE — 80053 COMPREHEN METABOLIC PANEL: CPT | Performed by: PSYCHIATRY & NEUROLOGY

## 2023-12-07 PROCEDURE — 85025 COMPLETE CBC W/AUTO DIFF WBC: CPT | Performed by: PSYCHIATRY & NEUROLOGY

## 2023-12-07 PROCEDURE — 82306 VITAMIN D 25 HYDROXY: CPT | Performed by: PSYCHIATRY & NEUROLOGY

## 2023-12-07 PROCEDURE — 36415 COLL VENOUS BLD VENIPUNCTURE: CPT | Performed by: PSYCHIATRY & NEUROLOGY

## 2023-12-07 PROCEDURE — 84443 ASSAY THYROID STIM HORMONE: CPT | Performed by: PSYCHIATRY & NEUROLOGY

## 2023-12-07 PROCEDURE — 83036 HEMOGLOBIN GLYCOSYLATED A1C: CPT | Performed by: PSYCHIATRY & NEUROLOGY

## 2023-12-07 PROCEDURE — 99285 EMERGENCY DEPT VISIT HI MDM: CPT | Performed by: PSYCHIATRY & NEUROLOGY

## 2023-12-07 RX ORDER — LURASIDONE HYDROCHLORIDE 20 MG/1
20 TABLET, FILM COATED ORAL DAILY
Status: DISCONTINUED | OUTPATIENT
Start: 2023-12-08 | End: 2023-12-14

## 2023-12-07 ASSESSMENT — ACTIVITIES OF DAILY LIVING (ADL): ADLS_ACUITY_SCORE: 35

## 2023-12-08 ENCOUNTER — TELEPHONE (OUTPATIENT)
Dept: BEHAVIORAL HEALTH | Facility: CLINIC | Age: 33
End: 2023-12-08
Payer: MEDICARE

## 2023-12-08 PROBLEM — F84.0 AUTISM: Status: ACTIVE | Noted: 2023-12-08

## 2023-12-08 PROBLEM — F25.0 SCHIZOAFFECTIVE DISORDER, BIPOLAR TYPE (H): Status: ACTIVE | Noted: 2023-12-08

## 2023-12-08 LAB
ALBUMIN SERPL BCG-MCNC: 3.9 G/DL (ref 3.5–5.2)
ALP SERPL-CCNC: 68 U/L (ref 40–150)
ALT SERPL W P-5'-P-CCNC: 35 U/L (ref 0–70)
AMPHETAMINES UR QL SCN: NORMAL
ANION GAP SERPL CALCULATED.3IONS-SCNC: 14 MMOL/L (ref 7–15)
AST SERPL W P-5'-P-CCNC: 33 U/L (ref 0–45)
BARBITURATES UR QL SCN: NORMAL
BASOPHILS # BLD AUTO: 0.1 10E3/UL (ref 0–0.2)
BASOPHILS NFR BLD AUTO: 1 %
BENZODIAZ UR QL SCN: NORMAL
BILIRUB SERPL-MCNC: 0.7 MG/DL
BUN SERPL-MCNC: 12 MG/DL (ref 6–20)
BZE UR QL SCN: NORMAL
CALCIUM SERPL-MCNC: 8.8 MG/DL (ref 8.6–10)
CANNABINOIDS UR QL SCN: NORMAL
CHLORIDE SERPL-SCNC: 104 MMOL/L (ref 98–107)
CREAT SERPL-MCNC: 0.78 MG/DL (ref 0.67–1.17)
DEPRECATED HCO3 PLAS-SCNC: 26 MMOL/L (ref 22–29)
EGFRCR SERPLBLD CKD-EPI 2021: >90 ML/MIN/1.73M2
EOSINOPHIL # BLD AUTO: 0.1 10E3/UL (ref 0–0.7)
EOSINOPHIL NFR BLD AUTO: 1 %
ERYTHROCYTE [DISTWIDTH] IN BLOOD BY AUTOMATED COUNT: 14 % (ref 10–15)
FENTANYL UR QL: NORMAL
GLUCOSE SERPL-MCNC: 88 MG/DL (ref 70–99)
HCT VFR BLD AUTO: 42.3 % (ref 40–53)
HGB BLD-MCNC: 13.8 G/DL (ref 13.3–17.7)
IMM GRANULOCYTES # BLD: 0 10E3/UL
IMM GRANULOCYTES NFR BLD: 0 %
LYMPHOCYTES # BLD AUTO: 2.5 10E3/UL (ref 0.8–5.3)
LYMPHOCYTES NFR BLD AUTO: 22 %
MCH RBC QN AUTO: 29.4 PG (ref 26.5–33)
MCHC RBC AUTO-ENTMCNC: 32.6 G/DL (ref 31.5–36.5)
MCV RBC AUTO: 90 FL (ref 78–100)
MONOCYTES # BLD AUTO: 0.8 10E3/UL (ref 0–1.3)
MONOCYTES NFR BLD AUTO: 7 %
NEUTROPHILS # BLD AUTO: 8 10E3/UL (ref 1.6–8.3)
NEUTROPHILS NFR BLD AUTO: 69 %
NRBC # BLD AUTO: 0 10E3/UL
NRBC BLD AUTO-RTO: 0 /100
OPIATES UR QL SCN: NORMAL
PCP QUAL URINE (ROCHE): NORMAL
PLATELET # BLD AUTO: 236 10E3/UL (ref 150–450)
POTASSIUM SERPL-SCNC: 3.2 MMOL/L (ref 3.4–5.3)
PROT SERPL-MCNC: 6.3 G/DL (ref 6.4–8.3)
RBC # BLD AUTO: 4.69 10E6/UL (ref 4.4–5.9)
SODIUM SERPL-SCNC: 144 MMOL/L (ref 135–145)
TSH SERPL DL<=0.005 MIU/L-ACNC: 1.1 UIU/ML (ref 0.3–4.2)
WBC # BLD AUTO: 11.5 10E3/UL (ref 4–11)

## 2023-12-08 PROCEDURE — 250N000013 HC RX MED GY IP 250 OP 250 PS 637: Performed by: PSYCHIATRY & NEUROLOGY

## 2023-12-08 PROCEDURE — 99205 OFFICE O/P NEW HI 60 MIN: CPT

## 2023-12-08 PROCEDURE — 80307 DRUG TEST PRSMV CHEM ANLYZR: CPT | Performed by: PSYCHIATRY & NEUROLOGY

## 2023-12-08 PROCEDURE — 250N000013 HC RX MED GY IP 250 OP 250 PS 637: Performed by: EMERGENCY MEDICINE

## 2023-12-08 RX ORDER — BUSPIRONE HYDROCHLORIDE 5 MG/1
5 TABLET ORAL 2 TIMES DAILY
Status: DISCONTINUED | OUTPATIENT
Start: 2023-12-08 | End: 2023-12-27 | Stop reason: HOSPADM

## 2023-12-08 RX ORDER — HYDROXYZINE HYDROCHLORIDE 25 MG/1
25 TABLET, FILM COATED ORAL EVERY 6 HOURS PRN
Status: DISCONTINUED | OUTPATIENT
Start: 2023-12-08 | End: 2023-12-11

## 2023-12-08 RX ORDER — POLYETHYLENE GLYCOL 3350 17 G/17G
17 POWDER, FOR SOLUTION ORAL DAILY
Status: DISCONTINUED | OUTPATIENT
Start: 2023-12-08 | End: 2023-12-11

## 2023-12-08 RX ADMIN — LURASIDONE HYDROCHLORIDE 20 MG: 20 TABLET, COATED ORAL at 00:05

## 2023-12-08 RX ADMIN — POLYETHYLENE GLYCOL 3350 17 G: 17 POWDER, FOR SOLUTION ORAL at 08:59

## 2023-12-08 RX ADMIN — BUSPIRONE HYDROCHLORIDE 5 MG: 5 TABLET ORAL at 20:32

## 2023-12-08 RX ADMIN — SODIUM PHOSPHATE 1 ENEMA: 7; 19 ENEMA RECTAL at 00:05

## 2023-12-08 RX ADMIN — LURASIDONE HYDROCHLORIDE 20 MG: 20 TABLET, COATED ORAL at 09:00

## 2023-12-08 ASSESSMENT — ACTIVITIES OF DAILY LIVING (ADL)
ADLS_ACUITY_SCORE: 35

## 2023-12-08 NOTE — MEDICATION SCRIBE - ADMISSION MEDICATION HISTORY
Medication Scribe Admission Medication History    Admission medication history is complete. The information provided in this note is only as accurate as the sources available at the time of the update.    Information Source(s): Patient and CareEverywhere/SureScripts via in-person    Pertinent Information: Patient reported he stopped taking Seroquel about a month ago.    Changes made to PTA medication list:  Added: None  Deleted: None  Changed: None    Medication Affordability:       Allergies reviewed with patient and updates made in EHR: yes    Medication History Completed By: Shereen Tineo 12/8/2023 4:08 PM    PTA Med List   Medication Sig Last Dose    acetaminophen (TYLENOL) 325 MG tablet Take 650 mg by mouth daily as needed (arm pain) More than a month    albuterol (PROAIR HFA/PROVENTIL HFA/VENTOLIN HFA) 108 (90 Base) MCG/ACT inhaler Inhale 2 puffs into the lungs 4 times daily as needed for shortness of breath or wheezing More than a month    hydrOXYzine (ATARAX) 25 MG tablet Take 1 tablet (25 mg) by mouth every 6 hours as needed for anxiety More than a month    senna-docusate (SENOKOT-S/PERICOLACE) 8.6-50 MG tablet Take 2 tablets by mouth 2 times daily More than a month    vitamin D2 (ERGOCALCIFEROL) 24758 units (1250 mcg) capsule Take 1 capsule (50,000 Units) by mouth every 7 days More than a month

## 2023-12-08 NOTE — ED TRIAGE NOTES
Pt reports having been off medications for a few months and is looking for help.      Triage Assessment (Adult)       Row Name 12/07/23 222          Triage Assessment    Airway WDL WDL        Respiratory WDL    Respiratory WDL WDL        Cardiac WDL    Cardiac WDL X;rhythm     Pulse Rate & Regularity tachycardic        Peripheral/Neurovascular WDL    Peripheral Neurovascular WDL WDL        Cognitive/Neuro/Behavioral WDL    Cognitive/Neuro/Behavioral WDL X;mood/behavior     Level of Consciousness alert     Arousal Level opens eyes spontaneously     Orientation oriented x 4     Speech clear     Mood/Behavior anxious

## 2023-12-08 NOTE — TELEPHONE ENCOUNTER
S: [facility] Nasrin ,  Clinical Supervisor of [facility]  calling at 10:51 AM about a 33 year old/Male presenting with SI w/ vague plan.      B: Pt arrived via EMS. Presenting problem, stressors: Pt has been off meds for couple of months and is experiencing SI with vague plans. Pt has a hx of psychosis.  He is experiencing command AH to hurt himself and responding internal stimuli. Pt is a poor historian.    Pt affect in ED: Flat  Pt Dx: Generalized Anxiety Disorder, Schizoaffective Disorder, and Autism Spectrum Disorder  Previous [facility] hx? Yes: Nov 2019  Pt endorses SI, no plan   Hx of suicide attempt? No  Pt denies SIB  Pt denies HI   Pt endorses auditory hallucinations .   Pt RARS Score: 3    Hx of aggression/violence, sexual offenses, legal concerns, Epic care plan? describe: No  Current concerns for aggression this visit? No  Does pt have a history of Civil Commitment? No  Is Pt their own guardian? Yes    Pt is prescribed medication. Is patient medication compliant? Pt refusing to take prescribed medications  Not taking meds for a couple of months. Starting Latuda today  Pt endorses OP services: Psychiatrist and   CD concerns: Pt is in recovery with a hx of marijuana and alcohol use   Acute or chronic medical concerns: No  Does Pt present with specific needs, assistive devices, or exclusionary criteria? None      Pt is ambulatory  Pt is able to perform ADLs independently      A: Pt to be reviewed for Critical access hospital admission. Pt is Voluntary  Preferred placement: [facility]    COVID Symptoms: No  If yes, COVID test required   Utox: Negative   CMP: Abnormalities: Potassium 3.2; Protein 6.3  CBC: Abnormalities: WBC 11.5    R: Patient cleared and ready for behavioral bed placement: Yes  Pt placed on Critical access hospital worklist? Yes    Does Patient need a Transfer Center request created? No, Pt is located within [facility] ED, [facility] ED, or [facility]

## 2023-12-08 NOTE — PROGRESS NOTES
"Triage & Transition Services, Extended Care     Therapy Progress Note    Patient: Connor goes by \"Connor,\" uses he/him pronouns  Date of Service: December 8, 2023  Site of Service: [facility] EMERGENCY DEPARTMENT                             ED10  Patient was seen yes  Mode of Assessment: In person    Presentation Summary: Pt was sitting on bed getting vitals taken when writer arrived.  Pt greeted writer appropriately and allowed writer to come in room.  Pt was observed to be looking either towards his right at the wall or looking down  throughout talking with writer.  Pt is observed to have disorganized speech.  Writer asked pt how he was feeling this morning and pt stated \"I'm fine.  No, okay. Okay no\".  Writer asked how pt arrived to the ED, pt stated \"people brought me here\" and could not clarify. Pt then stated \"I need the works\".  Writer stated \"the works?\" and Pt stated \"top to bottom\".  Writer asked if the nurse was helping him and he nodded yes.  Writer asked if pt lives alone and pt stated \"where I live, my head is home\".  Writer attempted to explore supports and coping skills.  Pt continued to respond mostly with unrelated words.  \"some people are open, some people are closed, I'm in-between\".  The earth is blue....green, black, white, organge yellow, purple, brown, red.  A lot of red.  Alot of black.\".  Pt was then able to identify that he likes to play video games, listen to music, draw, read and take naps.  Pt acknowledged he has not taken his meds and reports that he does not always feel like his medications help and that is why he stopped taking them.    Therapeutic Intervention(s) Provided: Engaged in guided discovery, explored patient's perspectives and helped expand them through socratic dialogue., Coached on coping techniques/relaxation skills to help improve distress tolerance and managing intense emotions., Provided positive reinforcement for progress towards goals, gains in " knowledge, and application of skills previously taught., Worked on relapse prevention planning (review of stressors, early warning signs, written plan to respond to signs, and rehearse plan)., Identified and practiced coping skills.    Current Symptoms:   avoidance, thoughts of death/suicide   psychomotor retardation      Mental Status Exam   Affect: Flat  Appearance: Disheveled  Attention Span/Concentration: Inattentive  Eye Contact: Avoidant, Intense (Pt's eye contact was either intense or looking away for long periods of time.)    Fund of Knowledge: Delayed   Language /Speech Content: Fluent  Language /Speech Volume: Normal  Language /Speech Rate/Productions: Minimally Responsive  Recent Memory: Poor  Remote Memory: Poor  Mood: Anxious, Sad, Depressed  Orientation to Person: Yes   Orientation to Place: Yes  Orientation to Time of Day: Yes  Orientation to Date: Yes     Situation (Do they understand why they are here?): Yes  Psychomotor Behavior: Underactive  Thought Content: Delusions, Hallucinations, Suicidal  Thought Form: Flight of Ideas    Treatment Objective(s) Addressed: rapport building, processing feelings, safety planning, building distress tolerance, assessing safety, identifying additional supports, exploring obstacles to safety in the community    Patient Response to Interventions: acceptance expressed, needs reinforcement    Progress Towards Goals: Patient Reports Symptoms Are: ongoing  Patient Progress Toward Goals: is not making progress  Next Step to Work Toward Discharge: symptom stabilization, patient ability to engage in safety planning  Symptom Stabilization Comment: Pt needs to restart medication to begin stablize mental health symptoms.  Ability to Engage Comment: Pt is not currently engaging in safety planning - e.g. declines to involve CM or county workers and is disorganized in thinking/speech which impedes his ability to safety plan and writer's confidence in his ability to follow through  with safety plan.    Case Management: Case Management Included: skills work  Details on skills work: positive coping skills    Plan: inpatient mental health  yes provider, RN Deepa Flores RN       Clinical Substantiation: Pt presents to ED for concerns of decompensating mental health, commanding AH, worsening SI, psychotic episode, discontinued medications for 2-3 months.  [facility] recommendation for safety and stabilization given pt's decompensated mental health status.    Legal Status: Legal Status at Admission: Voluntary/Patient has signed consent for treatment    Session Status: Time session started: 0903  Time session ended: 0922  Session Duration (minutes): 19 minutes  Session Number: 1  Anticipated number of sessions or this episode of care: 3    Time Spent: 19 minutes    CPT Code: CPT Codes: 45843 - Psychotherapy (with patient) - 30 (16-37*) min    Diagnosis:   Patient Active Problem List   Diagnosis Code    Depression, unspecified depression type F32.A    Psychosis (H) F29    Schizoaffective disorder, bipolar type (H) F25.0    Autism F84.0       Primary Problem This Admission: Active Hospital Problems    *Schizoaffective disorder, bipolar type (H)      Autism        Jennyfer Coello [facility]   Licensed Mental Health Professional (LMHP), [facility]  [phone]

## 2023-12-08 NOTE — PROGRESS NOTES
IP MH Referral Acuity Rating Score (RARS)    LMHP complete at referral to IP MH, with [facility]; and, daily while awaiting IP MH placement. Call score to PPS.  CRITERIA SCORING   New 74 HH and Involuntary for IP MH (not adolescent) 0/1   Boarding over 24 hours 0/1   Vulnerable adult at least 55+ with multiple co morbidities; or, Patient age 11 or under 0/1   Suicide ideation without relief of precipitating factors 1/1   Current plan for suicide 0/1   Current plan for homicide 0/1   Imminent risk or actual attempt to seriously harm another without relief of factors precipitating the attempt 0/1   Severe dysfunction in daily living (ex: complete neglect for self care, extreme disruption in vegetative function, extreme deterioration in social interactions) 1/1   Recent (last 2 weeks) or current physical aggression in the ED 0/1   Restraints or seclusion episode in ED 0/1   Verbal aggression, agitation, yelling, etc., while in the ED 0/1   Active psychosis with psychomotor agitation or catatonia 1/1   Need for constant or near constant redirection (from leaving, from others, etc).  0/1   Intrusive or disruptive behaviors 0/1   TOTAL Acuity Total Score: 3

## 2023-12-08 NOTE — ED NOTES
[facility] ED Mental Health Handoff Note:       Brief HPI:  This is a 33 year old male signed out to me by Dr. Calvillo.  See initial ED Provider note for full details of the presentation.  No events on today shift.    Home meds reviewed ordered/administered: Yes    Medically stable for inpatient mental health admission: Yes.    Evaluated by mental health: Yes. The recommendation is for inpatient mental health treatment. Bed search in process    Safety concerns: At the time I received sign out, there were no safety concerns.    Hold Status:  Active Orders   N/A       Exam:   Patient Vitals for the past 24 hrs:   BP Temp Temp src Pulse Resp SpO2   12/08/23 0800 132/84 98.5  F (36.9  C) Oral 100 20 100 %   12/07/23 2223 110/77 99  F (37.2  C) Oral (!) 124 20 96 %       General: Awake alert no acute distress  Lungs: Breathing comforably in room air  Neuro: Movs all sides of the body equally  Psych: calm pleasant affect.      ED Course:    Medications   lurasidone (LATUDA) tablet 20 mg (20 mg Oral $Given 12/8/23 0900)   polyethylene glycol (MIRALAX) Packet 17 g (17 g Oral $Given 12/8/23 0859)   sodium phosphate (FLEET ENEMA) 1 enema (1 enema Rectal $Given 12/8/23 0005)            There were no significant events during my shift.    Patient was signed out to the oncoming provider, Dr. Rose       Impression:    ICD-10-CM    1. Schizoaffective disorder, bipolar type (H)  F25.0       2. Autism  F84.0           Plan:    Awaiting mental health evaluation/recommendations.      RESULTS:   Results for orders placed or performed during the hospital encounter of 12/07/23 (from the past 24 hour(s))   [facility] Assessment Consult Order:     Status: None ()    Collection Time: 12/07/23 10:42 PM    Narrative    Yusef Combs LGSW     12/8/2023 12:13 AM  Diagnostic Evaluation Consultation  Crisis Assessment    Patient Name: Connor Soares  Age:  33 year old  Legal Sex: male  Gender Identity:  male  Pronouns:   Race: White  Ethnicity: Not  or   Language: English      Patient was assessed: In person      Patient location: [facility] EMERGENCY DEPARTMENT                             ED10    Referral Data and Chief Complaint  Connor Soares presents to the ED via EMS. Patient is   presenting to the ED for the following concerns: Suicidal   ideation, Depression, Significant behavioral change.   Factors   that make the mental health crisis life threatening or complex   are:  Pt presents to ED for concerns of suicidal ideation,   decompensating schizoaffective disorder, psychotic symptoms. Pt   states that his apartment has become inhabitable due to his   mental health symptoms. Pt reports he is not taking care of   himself. Pt has not taken psychiatric medications in several   months per his report. Pt endorses active suicidal thoughts. He   states he is experiencing commanding auditory hallucinations   telling him to hurt himself. When asking if voices are telling   him a specific plan, pt does not respond. Pt engaging in long   periods of staring at  without responding to assessment   questions. Pt requesting an enema from attending for unknown   reasons. Pt appears to be significantly decompensated due to   medication non compliance. Pt unable to specify if he has mental   health providers. Chart review shows pt has a , VM   was left. Chart review also shows pt was a no show for a recent   psychiatry appointment. Denies recent substance use..      Informed Consent and Assessment Methods  Explained the crisis assessment process, including applicable   information disclosures and limits to confidentiality, assessed   understanding of the process, and obtained consent to proceed   with the assessment.  Assessment methods included conducting a   formal interview with patient, review of medical records,   collaboration with medical staff, and obtaining relevant    collateral information from family and community providers when   available.  : done     Patient response to interventions: acceptance expressed  Coping skills were attempted to reduce the crisis:  denies being   able to engage in coping skills.     History of the Crisis   Hx of schizoaffective disorder bipolar type, ASD, MDD with   psychotic features, ARETHA. Hx of psychotic episodes resulting in   IPMH, most recently 11/30/2019. Pt has hx of meeting with   psychiatry and case management services. Pt a poor historian   given current mental health status. Pt has hx of alcohol and   marijuana abuse per chart review.    Brief Psychosocial History  Family:  Single, Children no  Support System:   (denies having supports)  Employment Status:  disabled  Source of Income:  disability  Financial Environmental Concerns:  none  Current Hobbies:   (does not respond)  Barriers in Personal Life:  mental health concerns    Significant Clinical History  Current Anxiety Symptoms:  anxious, excessive worry  Current Depression/Trauma:  thoughts of death/suicide,   helplessness, impaired decision making, withdrawl/isolation  Current Somatic Symptoms:  somatic symptoms (abdominal pain,   headache, tension)  Current Psychosis/Thought Disturbance:  psychomotor retardation,   distractability, auditory hallucinations, visual hallucinations  Current Eating Symptoms:     Chemical Use History:  Alcohol: None  Benzodiazepines: None  Opiates: None  Cocaine: None  Marijuana: None  Other Use: None   Past diagnosis:  Anxiety Disorder, Schizophrenia, Autism,   Depression  Family history:  No known history of mental health or chemical   health concerns  Past treatment:  Individual therapy, Primary Care, Psychiatric   Medication Management, Inpatient Hospitalization, Case management  Details of most recent treatment:  unknown  Other relevant history:          Collateral Information  Is there collateral information:  ( [employer] Phone    [phone], no answer, left VM)     Collateral information name, relationship, phone number:       What happened today:       What is different about patient's functioning:       Concern about alcohol/drug use:      What do you think the patient needs:      Has patient made comments about wanting to kill   themselves/others:      If d/c is recommended, can they take part in safety/aftercare   planning:       Additional collateral information:        Risk Assessment  Homeland Suicide Severity Rating Scale Full Clinical Version:             Homeland Suicide Severity Rating Scale Recent:   Suicidal Ideation (Recent)  Q1 Wished to be Dead (Past Month): yes  Q2 Suicidal Thoughts (Past Month): yes  Q3 Suicidal Thought Method: yes  Q4 Suicidal Intent without Specific Plan: no  Q5 Suicide Intent with Specific Plan: no  Level of Risk per Screen: moderate risk  Intensity of Ideation (Recent)  Most Severe Ideation Rating (Past 1 Month): 5  Frequency (Past 1 Month): Many times each day  Duration (Past 1 Month): 4-8 hours/most of day  Controllability (Past 1 Month): Unable to control thoughts  Deterrents (Past 1 Month): Deterrents definitely did not stop you  Reasons for Ideation (Past 1 Month): Completely to end or stop   the pain (You couldn't go on living with the pain or how you were   feeling)  Suicidal Behavior (Recent)  Actual Attempt (Past 3 Months): No  Has subject engaged in non-suicidal self-injurious behavior?   (Past 3 Months): No  Interrupted Attempts (Past 3 Months): No  Aborted or Self-Interrupted Attempt (Past 3 Months): No  Preparatory Acts or Behavior (Past 3 Months): No    Environmental or Psychosocial Events: unstable housing,   homelessness, other life stressors  Protective Factors: Protective Factors: help seeking, good   treatment engagement    Does the patient have thoughts of harming others? Feels Like   Hurting Others: no  Previous Attempt to Hurt Others: no  Is the patient engaging in sexually  inappropriate behavior?: no    Is the patient engaging in sexually inappropriate behavior?  no          Mental Status Exam   Affect: Flat  Appearance: Disheveled  Attention Span/Concentration: Inattentive  Eye Contact: Intense    Fund of Knowledge: Delayed   Language /Speech Content: Fluent  Language /Speech Volume: Normal  Language /Speech Rate/Productions: Minimally Responsive  Recent Memory: Poor  Remote Memory: Poor  Mood: Anxious, Sad, Depressed  Orientation to Person: Yes   Orientation to Place: Yes  Orientation to Time of Day: Yes  Orientation to Date: Yes     Situation (Do they understand why they are here?): Yes  Psychomotor Behavior: Underactive  Thought Content: Delusions, Hallucinations, Suicidal  Thought Form: Flight of Ideas        Medication  Psychotropic medications:   Medication Orders - Psychiatric (From admission, onward)      Start     Dose/Rate Route Frequency Ordered Stop    12/08/23 0000  lurasidone (LATUDA) tablet 20 mg         20 mg Oral DAILY 12/07/23 4177               Current Care Team  Patient Care Team:  No Ref-Primary, Physician as PCP - General    Diagnosis  Patient Active Problem List   Diagnosis Code    Depression, unspecified depression type F32.A    Psychosis (H) F29    Schizoaffective disorder, bipolar type (H) F25.0    Autism F84.0       Primary Problem This Admission  Active Hospital Problems    *Schizoaffective disorder, bipolar type (H)      Autism        Clinical Summary and Substantiation of Recommendations   Pt presents to ED for concerns of decompensating mental health ,   commanding AH, worsening SI, psychotic episode. See initial   assessment sections for further detail. [facility] recommended for   safety and stabilization given pt's decompensated mental health   status. Pt has been off mental health medications for several   months and reports he is open to restarting his medication   regimen. Psychiatric consult ordered. Pt voluntary.       Imminent risk of harm: Suicidal  Behavior  Severe psychiatric, behavioral or other comorbid conditions are   appropriate for management at inpatient mental health as   indicated by at least one of the following: Psychiatric Symptoms,   Cognitive or memory impairment, Symptoms of impact to function,   Impaired impulse control, judgement, or insight  Severe dysfunction in daily living is present as indicated by at   least one of the following: Other evidence of severe dysfunction,   Complete inability to maintain any appropriate aspect of personal   responsibility in any adult roles, Complete neglect of self care   with associated impairment in physical status  Situation and expectations are appropriate for inpatient care:   Voluntary treatment at lower level of care is not feasible  Inpatient mental health services are necessary to meet patient   needs and at least one of the following: Specific condition   related to admission diagnosis is present and judged likely to   further improve at proposed level of care, Specific condition   related to admission diagnosis is present and judged likely to   deteriorate in absence of treatment at proposed level of care      Patient coping skills attempted to reduce the crisis:  denies   being able to engage in coping skills.    Disposition  Recommended disposition: Inpatient Mental Health        Reviewed case and recommendations with attending provider.   Attending Name: Dr. Aviles       Attending concurs with disposition: yes       Patient and/or validated legal guardian concurs with disposition:     yes       Final disposition:  inpatient mental health    Legal status on admission: Voluntary/Patient has signed consent   for treatment    Assessment Details   Total duration spent with the patient: 35 min     CPT code(s) utilized: 88656 - Psychotherapy for Crisis - 60   (30-74*) min    LUANNE Tsai, Psychotherapist  [facility] - Triage & Transition Services  Callback: [phone]          CBC with platelets  differential     Status: Abnormal    Collection Time: 12/07/23 11:58 PM    Narrative    The following orders were created for panel order CBC with platelets differential.  Procedure                               Abnormality         Status                     ---------                               -----------         ------                     CBC with platelets and d...[[id number]]  Abnormal            Final result                 Please view results for these tests on the individual orders.   Comprehensive metabolic panel     Status: Abnormal    Collection Time: 12/07/23 11:58 PM   Result Value Ref Range    Sodium 144 135 - 145 mmol/L    Potassium 3.2 (L) 3.4 - 5.3 mmol/L    Carbon Dioxide (CO2) 26 22 - 29 mmol/L    Anion Gap 14 7 - 15 mmol/L    Urea Nitrogen 12.0 6.0 - 20.0 mg/dL    Creatinine 0.78 0.67 - 1.17 mg/dL    GFR Estimate >90 >60 mL/min/1.73m2    Calcium 8.8 8.6 - 10.0 mg/dL    Chloride 104 98 - 107 mmol/L    Glucose 88 70 - 99 mg/dL    Alkaline Phosphatase 68 40 - 150 U/L    AST 33 0 - 45 U/L    ALT 35 0 - 70 U/L    Protein Total 6.3 (L) 6.4 - 8.3 g/dL    Albumin 3.9 3.5 - 5.2 g/dL    Bilirubin Total 0.7 <=1.2 mg/dL   TSH with free T4 reflex     Status: Normal    Collection Time: 12/07/23 11:58 PM   Result Value Ref Range    TSH 1.10 0.30 - 4.20 uIU/mL   CBC with platelets and differential     Status: Abnormal    Collection Time: 12/07/23 11:58 PM   Result Value Ref Range    WBC Count 11.5 (H) 4.0 - 11.0 10e3/uL    RBC Count 4.69 4.40 - 5.90 10e6/uL    Hemoglobin 13.8 13.3 - 17.7 g/dL    Hematocrit 42.3 40.0 - 53.0 %    MCV 90 78 - 100 fL    MCH 29.4 26.5 - 33.0 pg    MCHC 32.6 31.5 - 36.5 g/dL    RDW 14.0 10.0 - 15.0 %    Platelet Count 236 150 - 450 10e3/uL    % Neutrophils 69 %    % Lymphocytes 22 %    % Monocytes 7 %    % Eosinophils 1 %    % Basophils 1 %    % Immature Granulocytes 0 %    NRBCs per 100 WBC 0 <1 /100    Absolute Neutrophils 8.0 1.6 - 8.3 10e3/uL    Absolute Lymphocytes 2.5 0.8 - 5.3  10e3/uL    Absolute Monocytes 0.8 0.0 - 1.3 10e3/uL    Absolute Eosinophils 0.1 0.0 - 0.7 10e3/uL    Absolute Basophils 0.1 0.0 - 0.2 10e3/uL    Absolute Immature Granulocytes 0.0 <=0.4 10e3/uL    Absolute NRBCs 0.0 10e3/uL   Urine Drug Screen     Status: Normal    Collection Time: 12/08/23  9:02 AM    Narrative    The following orders were created for panel order Urine Drug Screen.  Procedure                               Abnormality         Status                     ---------                               -----------         ------                     Urine Drug Screen Panel[[id number]]      Normal              Final result                 Please view results for these tests on the individual orders.   Urine Drug Screen Panel     Status: Normal    Collection Time: 12/08/23  9:02 AM   Result Value Ref Range    Amphetamines Urine Screen Negative Screen Negative    Barbituates Urine Screen Negative Screen Negative    Benzodiazepine Urine Screen Negative Screen Negative    Cannabinoids Urine Screen Negative Screen Negative    Cocaine Urine Screen Negative Screen Negative    Fentanyl Qual Urine Screen Negative Screen Negative    Opiates Urine Screen Negative Screen Negative    PCP Urine Screen Negative Screen Negative             MD Darcie Coyle Evan Martin, MD  12/08/23 0894

## 2023-12-08 NOTE — CONSULTS
Diagnostic Evaluation Consultation  Crisis Assessment    Patient Name: Connor Soares  Age:  33 year old  Legal Sex: male  Gender Identity: male  Pronouns:   Race: White  Ethnicity: Not  or   Language: English      Patient was assessed: In person      Patient location: [facility] EMERGENCY DEPARTMENT                             ED10    Referral Data and Chief Complaint  Connor Soares presents to the ED via EMS. Patient is presenting to the ED for the following concerns: Suicidal ideation, Depression, Significant behavioral change.   Factors that make the mental health crisis life threatening or complex are:  Pt presents to ED for concerns of suicidal ideation, decompensating schizoaffective disorder, psychotic symptoms. Pt states that his apartment has become inhabitable due to his mental health symptoms. Pt reports he is not taking care of himself. Pt has not taken psychiatric medications in several months per his report. Pt endorses active suicidal thoughts. He states he is experiencing commanding auditory hallucinations telling him to hurt himself. When asking if voices are telling him a specific plan, pt does not respond. Pt engaging in long periods of staring at  without responding to assessment questions. Pt requesting an enema from attending for unknown reasons. Pt appears to be significantly decompensated due to medication non compliance. Pt unable to specify if he has mental health providers. Chart review shows pt has a , VM was left. Chart review also shows pt was a no show for a recent psychiatry appointment. Denies recent substance use..      Informed Consent and Assessment Methods  Explained the crisis assessment process, including applicable information disclosures and limits to confidentiality, assessed understanding of the process, and obtained consent to proceed with the assessment.  Assessment methods included conducting a formal interview with  patient, review of medical records, collaboration with medical staff, and obtaining relevant collateral information from family and community providers when available.  : done     Patient response to interventions: acceptance expressed  Coping skills were attempted to reduce the crisis:  denies being able to engage in coping skills.     History of the Crisis   Hx of schizoaffective disorder bipolar type, ASD, MDD with psychotic features, ARETHA. Hx of psychotic episodes resulting in [facility], most recently 11/30/2019. Pt has hx of meeting with psychiatry and case management services. Pt a poor historian given current mental health status. Pt has hx of alcohol and marijuana abuse per chart review.    Brief Psychosocial History  Family:  Single, Children no  Support System:   (denies having supports)  Employment Status:  disabled  Source of Income:  disability  Financial Environmental Concerns:  none  Current Hobbies:   (does not respond)  Barriers in Personal Life:  mental health concerns    Significant Clinical History  Current Anxiety Symptoms:  anxious, excessive worry  Current Depression/Trauma:  thoughts of death/suicide, helplessness, impaired decision making, withdrawl/isolation  Current Somatic Symptoms:  somatic symptoms (abdominal pain, headache, tension)  Current Psychosis/Thought Disturbance:  psychomotor retardation, distractability, auditory hallucinations, visual hallucinations  Current Eating Symptoms:     Chemical Use History:  Alcohol: None  Benzodiazepines: None  Opiates: None  Cocaine: None  Marijuana: None  Other Use: None   Past diagnosis:  Anxiety Disorder, Schizophrenia, Autism, Depression  Family history:  No known history of mental health or chemical health concerns  Past treatment:  Individual therapy, Primary Care, Psychiatric Medication Management, Inpatient Hospitalization, Case management  Details of most recent treatment:  unknown  Other relevant history:          Collateral Information  Is  there collateral information:  ( [facility] Phone [phone], no answer, left VM)     Collateral information name, relationship, phone number:       What happened today:       What is different about patient's functioning:       Concern about alcohol/drug use:      What do you think the patient needs:      Has patient made comments about wanting to kill themselves/others:      If d/c is recommended, can they take part in safety/aftercare planning:       Additional collateral information:        Risk Assessment  [place] Suicide Severity Rating Scale Full Clinical Version:             [place] Suicide Severity Rating Scale Recent:   Suicidal Ideation (Recent)  Q1 Wished to be Dead (Past Month): yes  Q2 Suicidal Thoughts (Past Month): yes  Q3 Suicidal Thought Method: yes  Q4 Suicidal Intent without Specific Plan: no  Q5 Suicide Intent with Specific Plan: no  Level of Risk per Screen: moderate risk  Intensity of Ideation (Recent)  Most Severe Ideation Rating (Past 1 Month): 5  Frequency (Past 1 Month): Many times each day  Duration (Past 1 Month): 4-8 hours/most of day  Controllability (Past 1 Month): Unable to control thoughts  Deterrents (Past 1 Month): Deterrents definitely did not stop you  Reasons for Ideation (Past 1 Month): Completely to end or stop the pain (You couldn't go on living with the pain or how you were feeling)  Suicidal Behavior (Recent)  Actual Attempt (Past 3 Months): No  Has subject engaged in non-suicidal self-injurious behavior? (Past 3 Months): No  Interrupted Attempts (Past 3 Months): No  Aborted or Self-Interrupted Attempt (Past 3 Months): No  Preparatory Acts or Behavior (Past 3 Months): No    Environmental or Psychosocial Events: unstable housing, homelessness, other life stressors  Protective Factors: Protective Factors: help seeking, good treatment engagement    Does the patient have thoughts of harming others? Feels Like Hurting Others: no  Previous Attempt to Hurt Others:  no  Is the patient engaging in sexually inappropriate behavior?: no    Is the patient engaging in sexually inappropriate behavior?  no        Mental Status Exam   Affect: Flat  Appearance: Disheveled  Attention Span/Concentration: Inattentive  Eye Contact: Intense    Fund of Knowledge: Delayed   Language /Speech Content: Fluent  Language /Speech Volume: Normal  Language /Speech Rate/Productions: Minimally Responsive  Recent Memory: Poor  Remote Memory: Poor  Mood: Anxious, Sad, Depressed  Orientation to Person: Yes   Orientation to Place: Yes  Orientation to Time of Day: Yes  Orientation to Date: Yes     Situation (Do they understand why they are here?): Yes  Psychomotor Behavior: Underactive  Thought Content: Delusions, Hallucinations, Suicidal  Thought Form: Flight of Ideas        Medication  Psychotropic medications:   Medication Orders - Psychiatric (From admission, onward)      Start     Dose/Rate Route Frequency Ordered Stop    12/08/23 0000  lurasidone (LATUDA) tablet 20 mg         20 mg Oral DAILY 12/07/23 9049               Current Care Team  Patient Care Team:  No Ref-Primary, Physician as PCP - General    Diagnosis  Patient Active Problem List   Diagnosis Code    Depression, unspecified depression type F32.A    Psychosis (H) F29    Schizoaffective disorder, bipolar type (H) F25.0    Autism F84.0       Primary Problem This Admission  Active Hospital Problems    *Schizoaffective disorder, bipolar type (H)      Autism        Clinical Summary and Substantiation of Recommendations   Pt presents to ED for concerns of decompensating mental health , commanding AH, worsening SI, psychotic episode. See initial assessment sections for further detail. [facility] recommended for safety and stabilization given pt's decompensated mental health status. Pt has been off mental health medications for several months and reports he is open to restarting his medication regimen. Psychiatric consult ordered. Pt voluntary.        Imminent risk of harm: Suicidal Behavior  Severe psychiatric, behavioral or other comorbid conditions are appropriate for management at inpatient mental health as indicated by at least one of the following: Psychiatric Symptoms, Cognitive or memory impairment, Symptoms of impact to function, Impaired impulse control, judgement, or insight  Severe dysfunction in daily living is present as indicated by at least one of the following: Other evidence of severe dysfunction, Complete inability to maintain any appropriate aspect of personal responsibility in any adult roles, Complete neglect of self care with associated impairment in physical status  Situation and expectations are appropriate for inpatient care: Voluntary treatment at lower level of care is not feasible  Inpatient mental health services are necessary to meet patient needs and at least one of the following: Specific condition related to admission diagnosis is present and judged likely to further improve at proposed level of care, Specific condition related to admission diagnosis is present and judged likely to deteriorate in absence of treatment at proposed level of care      Patient coping skills attempted to reduce the crisis:  denies being able to engage in coping skills.    Disposition  Recommended disposition: Inpatient Mental Health        Reviewed case and recommendations with attending provider. Attending Name: Dr. Aviles       Attending concurs with disposition: yes       Patient and/or validated legal guardian concurs with disposition:   yes       Final disposition:  inpatient mental health    Legal status on admission: Voluntary/Patient has signed consent for treatment    Assessment Details   Total duration spent with the patient: 35 min     CPT code(s) utilized: 28348 - Psychotherapy for Crisis - 60 (30-74*) min    LUANNE Tsai, Psychotherapist  DEC - Triage & Transition Services  Callback: [phone]

## 2023-12-08 NOTE — PLAN OF CARE
Connor Soares  December 8, 2023  Plan of Care Hand-off Note     Patient Care Path: inpatient mental health    Plan for Care:   Pt presents to ED for concerns of decompensating mental health , commanding AH, worsening SI, psychotic episode. See initial assessment sections for further detail. [facility] recommended for safety and stabilization given pt's decompensated mental health status. Pt has been off mental health medications for several months and reports he is open to restarting his medication regimen. Psychiatric consult ordered. Pt voluntary.    Identified Goals and Safety Issues: restart meds, [facility] for safety/stabilization    Overview:   [facility] Phone [phone]            Legal Status: Legal Status at Admission: Voluntary/Patient has signed consent for treatment    Psychiatry Consult: ordered       Updated  rn, attending,  via  regarding plan of care.           LUANNE Tsai
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
## 2023-12-08 NOTE — TELEPHONE ENCOUNTER
R: [place]  Access Inpatient Bed Call Log 12/8/23 3:51 PM    Intake has called facilities that have not updated the bed status within the last 12 hours.  ([place])                  [facility] is at capacity            [facility] is posting 0 beds. [phone]. Per call at 3:56 pm to APS possibly low acuity. 12/8 Pt currently reviewing.  [facility] is posting 0 beds. Negative covid required.                 [facility] is posting 0 bed. Neg covid. No [facility] or [facility]. [phone]. Per call at 3:59 pm to [facility] depends on acuity.   [facility] is posting 0 beds. [phone]   [facility] is posting 0 beds. [phone]   [facility] is posting 7 beds. Negative covid. [phone] Per call @8:32am   [facility] is posting 0 beds           [facility] ([facility] is posting 0 beds [phone].    Per call at 5:28 pm to [place] at [facility] can review for admission to fax clinical to [phone].  5:58 PM Fax sent.  6:51 PM Patient declined d/t acuity.  Patient remains on the work list pending appropriate bed availability.

## 2023-12-08 NOTE — ED PROVIDER NOTES
"    [facility] [place] ([facility])    12/07/23      ED PROVIDER NOTE      History     Chief Complaint   Patient presents with    Psychiatric Evaluation     Pt reports being off his meds and needs further phycological eval     HPI  Connor Soares is a 33 year old male with a past medical history significant for schizoaffective disorder (bipolar type), psychosis, and autism spectrum disorder presents to the ED for psychiatric evaluation.  Patient states that he has been off of his medications for the past couple of months.  Per chart review, he \"no-showed\" on his psychiatric visit last month. He allegedly had been taking his meds upon his previous psychiatric visit in October 2023.  Patient states that he has been severely decompensated lately.  Patient seems to have difficulty responding to questions and tends to respond with a blank stare.  He appears to be responding to internal stimuli.  Patient does report auditory hallucinations and notes that his apartment has become dirty and messy, to the point that it has become inhabitable.  He states that he has not been taking care of himself.  Patient denies drug or alcohol use.  When asked about his recent treatment for substance abuse, he states that he is unsure of this.  Patient is open to voluntary admission and to taking his medications at this time.    Please see DEC crisis assessment in epic on 12/7/2023 for further details.    Past Medical History  No past medical history on file.  No past surgical history on file.  acetaminophen (TYLENOL) 325 MG tablet  albuterol (PROAIR HFA/PROVENTIL HFA/VENTOLIN HFA) 108 (90 Base) MCG/ACT inhaler  hydrOXYzine (ATARAX) 25 MG tablet  QUEtiapine (SEROQUEL) 300 MG tablet  senna-docusate (SENOKOT-S/PERICOLACE) 8.6-50 MG tablet  vitamin D2 (ERGOCALCIFEROL) 45310 units (1250 mcg) capsule      No Known Allergies  Family History  No family history on file.  Social History       Past medical history, past surgical " history, medications, allergies, family history, and social history were reviewed with the patient. No additional pertinent items.      A medically appropriate review of systems was performed with pertinent positives and negatives noted in the HPI, and all other systems negative.    Patient reports being constipated and he insists on getting an enema.     Physical Exam   BP: 110/77  Pulse: (!) 124  Temp: 99  F (37.2  C)  Resp: 20  SpO2: 96 %  Physical Exam  Vitals and nursing note reviewed.   HENT:      Head: Normocephalic.   Eyes:      Pupils: Pupils are equal, round, and reactive to light.   Pulmonary:      Effort: Pulmonary effort is normal.   Musculoskeletal:         General: Normal range of motion.      Cervical back: Normal range of motion.   Neurological:      General: No focal deficit present.      Mental Status: He is alert.   Psychiatric:         Mood and Affect: Mood normal.         Behavior: Behavior normal.         Thought Content: Thought content normal.         Judgment: Judgment normal.           ED Course, Procedures, & Data      Procedures       A consult was attained from the [facility] service. The case was discussed with the  from that service. The consulting service's recommendations were provided at 11:00 PM. 10 minutes spent discussing case, care and disposition. 10 minutes spent reviewing prior records and intervention.   Mental Health Risk Assessment        PSS-3      Date and Time Over the past 2 weeks have you felt down, depressed, or hopeless? Over the past 2 weeks have you had thoughts of killing yourself? Have you ever attempted to kill yourself? When did this last happen? User   12/07/23 2228 yes yes yes more than 6 months ago MDS          C-SSRS ([place])      Date and Time Q1 Wished to be Dead (Past Month) Q2 Suicidal Thoughts (Past Month) Q3 Suicidal Thought Method Q4 Suicidal Intent without Specific Plan Q5 Suicide Intent with Specific Plan Q6 Suicide Behavior (Lifetime)  Within the Past 3 Months? RETIRED: Level of Risk per Screen Screening Not Complete User   12/07/23 2229 yes yes yes no no -- -- -- -- MDS                Suicide assessment completed by [facility] (DSandeepESandeepC., LCSW, etc.)       No results found for any visits on 12/07/23.  Medications   sodium phosphate (FLEET ENEMA) 1 enema (has no administration in time range)   lurasidone (LATUDA) tablet 20 mg (has no administration in time range)     Labs Ordered and Resulted from Time of ED Arrival to Time of ED Departure - No data to display  No orders to display          Critical care was not performed.     Medical Decision Making  The patient's presentation was of high complexity (a chronic illness severe exacerbation, progression, or side effect of treatment).    The patient's evaluation involved:  an assessment requiring an independent historian (see separate area of note for details)  review of external note(s) from 3+ sources (see separate area of note for details)  ordering and/or review of 3+ test(s) in this encounter (see separate area of note for details)    The patient's management necessitated moderate risk (limitations due to social determinants of health (inadequate community  support)) and high risk (a decision regarding hospitalization).    Assessment & Plan    Patient is here seeking help for feeling helpless and incapacitated. He has history of schizoaffective disorder and admits to not taking his meds for several months and now has decompensated and is gravely disabled and he is unable to care for himself in the community. He is open to checking himself into the hospital. This seems reasonable as the most appropriate level of care.    Patient would like to try Latuda. He was started on 20 mg daily while he boards in the ED. He also insists on an enema. He gale snot want to try Miralax or any oral laxatives, preferring the immediate effects of an enema for his constipation. He was given a Fleets enema.    I  have reviewed the nursing notes. I have reviewed the findings, diagnosis, plan and need for follow up with the patient.    New Prescriptions    No medications on file       Final diagnoses:   Schizoaffective disorder, bipolar type (H)   Autism       Chris Aviles MD  [facility] EMERGENCY DEPARTMENT  12/7/2023     Chris Aviles MD  12/08/23 0000

## 2023-12-08 NOTE — ED NOTES
Bed: [facility]  Expected date:   Expected time:   Means of arrival:   Comments:  SPFD 23  33 M  SI, off meds

## 2023-12-09 ENCOUNTER — TELEPHONE (OUTPATIENT)
Dept: BEHAVIORAL HEALTH | Facility: CLINIC | Age: 33
End: 2023-12-09
Payer: MEDICARE

## 2023-12-09 PROCEDURE — 250N000013 HC RX MED GY IP 250 OP 250 PS 637: Performed by: EMERGENCY MEDICINE

## 2023-12-09 PROCEDURE — 250N000013 HC RX MED GY IP 250 OP 250 PS 637: Performed by: FAMILY MEDICINE

## 2023-12-09 PROCEDURE — 250N000013 HC RX MED GY IP 250 OP 250 PS 637: Performed by: PSYCHIATRY & NEUROLOGY

## 2023-12-09 RX ORDER — OLANZAPINE 5 MG/1
5 TABLET, ORALLY DISINTEGRATING ORAL 2 TIMES DAILY PRN
Status: DISCONTINUED | OUTPATIENT
Start: 2023-12-09 | End: 2023-12-11

## 2023-12-09 RX ADMIN — BUSPIRONE HYDROCHLORIDE 5 MG: 5 TABLET ORAL at 20:06

## 2023-12-09 RX ADMIN — BUSPIRONE HYDROCHLORIDE 5 MG: 5 TABLET ORAL at 11:29

## 2023-12-09 RX ADMIN — HYDROXYZINE HYDROCHLORIDE 25 MG: 25 TABLET, FILM COATED ORAL at 12:52

## 2023-12-09 RX ADMIN — LURASIDONE HYDROCHLORIDE 20 MG: 20 TABLET, COATED ORAL at 11:29

## 2023-12-09 RX ADMIN — OLANZAPINE 5 MG: 5 TABLET, ORALLY DISINTEGRATING ORAL at 14:34

## 2023-12-09 ASSESSMENT — ACTIVITIES OF DAILY LIVING (ADL)
ADLS_ACUITY_SCORE: 35

## 2023-12-09 NOTE — ED PROVIDER NOTES
Patient was signed out to me. He is pending admission to mental health. He presented for hallucinations, decompensation of mental health, inability to care for himself. He was initially voluntary. He was seen by DEC and psychiatry who recommended mental health admission.    I was informed that the patient wanted to leave/be discharged. I re-evaluated the patient. He stated that he wanted to get his belongings and leave and that he did not need to be here. He would not elaborate as to why he wanted to leave or answer any questions about why he first came or his current mental status. I discussed our concerns as to why we along with the DEC  and psychiatry recommended mental health admission but he continued to demand to leave and would not discuss or answer any questions. He lacks insight into his condition or need for treatment. He is a high risk for imminent harm if he were discharged and so I placed him on a hold.     Herbert Carter MD  12/09/23 0038

## 2023-12-09 NOTE — TELEPHONE ENCOUNTER
[place]: [place]  Access Inpatient Bed Call Log 12/9/23 @ 1:00am   Intake has called facilities that have not updated their bed status within the last 12 hours.??     *METRO:  [place] [facility]: @ cap per website.  [place] [facility]:  @ cap per website.  [place] -- [facility]: @ cap per website.  [place] -- [facility]:  @ cap per website. Low acuity only.  [place] -- [facility]: @ cap per website.  [facility]: @ cap per website.  [facility]/ beds - POSTING 4 BEDS. Ages 18-25, Voluntary only, NO aggression/physical/sexual assault, violence hx or drug abuse. Negative Covid.  [place] [facility]: @ cap per website.  [place] -- [facility]: @ cap per website.  [place] -- [facility]: @ cap per website.     Pt remains on waitlist pending appropriate placement availability

## 2023-12-09 NOTE — ED NOTES
Patient has been restless off and on.  Wanting to go home.  He can be redirected.  PRN zyprexa added BID.

## 2023-12-09 NOTE — TELEPHONE ENCOUNTER
[place]: [place]  Access Inpatient Bed Call Log 12/9/23 8:15 AM    Intake has called facilities that have not updated the bed status within the last 12 hours.  (Statewide)                 [facility] is at capacity.  [facility] is posting 0 beds. [phone]. 12/8 declined d/t acuity.  [facility] is posting 0 beds. Negative covid required.                [facility] is posting 0 bed. Neg covid. No high school or [place]-psych. [phone]  [place] is posting 0 beds. [phone].   [facility] is posting 0 beds. [phone]. 12/9 Per call at 10:01 am to [facility] currently at capacity.  [facility] is posting 5 beds. Negative covid. [phone] Per call @8:13 am 12/9 Pt not appropriate d/t unit requirements.  [facility] is posting 0 beds          [facility] is posting 0 beds [phone]  [facility] is posting 3 beds. LOW acuity ONLY. Mixed unit 12+. Negative covid- [phone]  [facility] has 2 beds posted. No aggression. Negative Covid. Low acuity   [facility] is posting 0 beds. Negative covid. [phone]   [facility] is posting 6 beds. Low acuity only. Negative covid.  [phone]   [facility] is posting 0 beds. Low acuity. No current aggression. [phone]   [facility] is posting 3 beds available. Negative covid.  [phone].      [facility] is posting 0 bed. Low acuity. 72 HH hold preferred. Negative covid required. [phone] Per call @7:42am, currently closed for the day.  [facility]) is posting 7 beds. Low acuity only. Negative covid.  [phone]   [facility] [place] is posting 0 beds.  Negative covid required.   Vol only, No history of aggression, violence, or assault. No sexual offenders. No 72 HH holds. [phone] 12/9 Pt not appropriate d/t 72 HH.    [facility] is posting 7 beds. Negative covid  required.  (Must have the cognitive ability to do programming. No aggressive or violent behavior or recent HX in the last 2 yrs. MH must be primary.) Always low acuity. [phone]   [facility] has 3 beds posted. Negative covid required.  Low acuity only. Violence and aggression capped.  [phone]   [facility] is posting 0 beds. Low acuity, Negative covid required. [phone] Per call @7:44am, no beds  [facility] is posting 5 beds. Unit is a combined unit (14+). No aggressive patients. Voluntary only. Must be accompanied by a guardian.  Negative covid. [phone]. 12/9 Pt not appropriate d/t 72 HH.  [facility] Negative covid required.  [phone]   [facility] [place] is posting 4 beds. Negative covid. LOW acuity. (No lines, drains, or tubes, oxygen, CPAP, IV, etc.) Must Have a Ride Home. [phone] Per call @7:46am, do not call unless pt has a ride home.  [facility] ([place]) is posting 5 beds. Negative covid. (No. lines, drains, or tubes, oxygen, CPAP, IV, etc.). [phone] Per call @7:47 case-by-case, at [facility] for low acuity  [facility] is posting 21 beds. No covid test required. [phone] Per call @8:08am, no answer. 12/9 Pt not appropriate d/t [place] 72 HH.     Per call at 10:03 AM [facility] willing to review for [facility] location.  11:01 AM Fax sent for review.  2:28 PM PPS received call from [facility] that patient was declined to [facility] d/t acuity.    Patient remains on the work list pending appropriate bed availability

## 2023-12-09 NOTE — ED NOTES
Interdry offered but pt declined stating it will heal overtime. Pt is aware that it is available to the pt if he changes his mind.

## 2023-12-09 NOTE — ED PROVIDER NOTES
[facility] ED Mental Health Handoff Note:       Brief HPI:  This is a 33 year old male signed out to me by Dr. Ortiz.  See initial ED Provider note for full details of the presentation. Interval history is pertinent for no acute events or change.    Home meds reviewed and ordered/administered: Yes    Medically stable for inpatient mental health admission: Yes.    Evaluated by mental health: Yes. The recommendation is for inpatient mental health treatment. Bed search in process    Safety concerns: At the time I received sign out, there were no safety concerns.    Hold Status:  Active Orders   Legal    Emergency Hospitalization Hold (72 Hr Hold)     Frequency: Effective Now     Start Date/Time: 12/09/23 0020      Number of Occurrences: Until Specified            Exam:   Patient Vitals for the past 24 hrs:   BP Temp Temp src Pulse Resp SpO2   12/09/23 1000 135/82 98.3  F (36.8  C) Oral 108 16 98 %   12/08/23 2256 (!) 139/97 98.2  F (36.8  C) Oral 100 15 96 %   12/08/23 1742 (!) 138/94 98.3  F (36.8  C) -- 97 16 95 %           ED Course:    Medications   lurasidone (LATUDA) tablet 20 mg (20 mg Oral $Given 12/9/23 1129)   polyethylene glycol (MIRALAX) Packet 17 g (17 g Oral Not Given 12/9/23 1251)   busPIRone (BUSPAR) tablet 5 mg (5 mg Oral $Given 12/9/23 1129)   hydrOXYzine HCl (ATARAX) tablet 25 mg (25 mg Oral $Given 12/9/23 1252)   QUEtiapine (SEROquel) tablet 300 mg (has no administration in time range)   OLANZapine zydis (zyPREXA) ODT tab 5 mg (5 mg Oral $Given 12/9/23 1434)   nicotine polacrilex (NICORETTE) gum 4 mg (has no administration in time range)   sodium phosphate (FLEET ENEMA) 1 enema (1 enema Rectal $Given 12/8/23 0005)            There were no significant events during my shift.    Patient was signed out to the oncoming provider      Impression:    ICD-10-CM    1. Schizoaffective disorder, bipolar type (H)  F25.0       2. Autism  F84.0           Plan:    Awaiting inpatient mental health  admission/transfer.      RESULTS:   No results found for this visit on 12/07/23 (from the past 24 hour(s)).          MD Whit Castelan David, MD  12/09/23 4025

## 2023-12-10 ENCOUNTER — TELEPHONE (OUTPATIENT)
Dept: BEHAVIORAL HEALTH | Facility: CLINIC | Age: 33
End: 2023-12-10
Payer: MEDICARE

## 2023-12-10 PROCEDURE — 99223 1ST HOSP IP/OBS HIGH 75: CPT | Mod: AI | Performed by: PSYCHIATRY & NEUROLOGY

## 2023-12-10 PROCEDURE — 124N000002 HC R&B MH UMMC

## 2023-12-10 PROCEDURE — 250N000013 HC RX MED GY IP 250 OP 250 PS 637: Performed by: FAMILY MEDICINE

## 2023-12-10 PROCEDURE — 250N000013 HC RX MED GY IP 250 OP 250 PS 637: Performed by: PSYCHIATRY & NEUROLOGY

## 2023-12-10 PROCEDURE — 250N000013 HC RX MED GY IP 250 OP 250 PS 637: Performed by: EMERGENCY MEDICINE

## 2023-12-10 RX ORDER — TRAZODONE HYDROCHLORIDE 50 MG/1
50 TABLET, FILM COATED ORAL
Status: DISCONTINUED | OUTPATIENT
Start: 2023-12-10 | End: 2023-12-27 | Stop reason: HOSPADM

## 2023-12-10 RX ORDER — ACETAMINOPHEN 325 MG/1
650 TABLET ORAL EVERY 4 HOURS PRN
Status: DISCONTINUED | OUTPATIENT
Start: 2023-12-10 | End: 2023-12-27 | Stop reason: HOSPADM

## 2023-12-10 RX ORDER — OLANZAPINE 10 MG/2ML
10 INJECTION, POWDER, FOR SOLUTION INTRAMUSCULAR 3 TIMES DAILY PRN
Status: DISCONTINUED | OUTPATIENT
Start: 2023-12-10 | End: 2023-12-11

## 2023-12-10 RX ORDER — HYDROXYZINE HYDROCHLORIDE 25 MG/1
25 TABLET, FILM COATED ORAL EVERY 4 HOURS PRN
Status: DISCONTINUED | OUTPATIENT
Start: 2023-12-10 | End: 2023-12-27 | Stop reason: HOSPADM

## 2023-12-10 RX ORDER — AMOXICILLIN 250 MG
1 CAPSULE ORAL 2 TIMES DAILY PRN
Status: DISCONTINUED | OUTPATIENT
Start: 2023-12-10 | End: 2023-12-27 | Stop reason: HOSPADM

## 2023-12-10 RX ORDER — LORAZEPAM 2 MG/ML
1 INJECTION INTRAMUSCULAR EVERY 4 HOURS PRN
Status: DISCONTINUED | OUTPATIENT
Start: 2023-12-10 | End: 2023-12-27 | Stop reason: HOSPADM

## 2023-12-10 RX ORDER — OLANZAPINE 10 MG/1
10 TABLET ORAL 3 TIMES DAILY PRN
Status: DISCONTINUED | OUTPATIENT
Start: 2023-12-10 | End: 2023-12-11

## 2023-12-10 RX ORDER — MAGNESIUM HYDROXIDE/ALUMINUM HYDROXICE/SIMETHICONE 120; 1200; 1200 MG/30ML; MG/30ML; MG/30ML
30 SUSPENSION ORAL EVERY 4 HOURS PRN
Status: DISCONTINUED | OUTPATIENT
Start: 2023-12-10 | End: 2023-12-27 | Stop reason: HOSPADM

## 2023-12-10 RX ORDER — LORAZEPAM 1 MG/1
1 TABLET ORAL EVERY 4 HOURS PRN
Status: DISCONTINUED | OUTPATIENT
Start: 2023-12-10 | End: 2023-12-27 | Stop reason: HOSPADM

## 2023-12-10 RX ADMIN — QUETIAPINE 300 MG: 100 TABLET ORAL at 19:24

## 2023-12-10 RX ADMIN — BUSPIRONE HYDROCHLORIDE 5 MG: 5 TABLET ORAL at 19:23

## 2023-12-10 RX ADMIN — BUSPIRONE HYDROCHLORIDE 5 MG: 5 TABLET ORAL at 10:38

## 2023-12-10 RX ADMIN — HYDROXYZINE HYDROCHLORIDE 25 MG: 25 TABLET, FILM COATED ORAL at 16:46

## 2023-12-10 RX ADMIN — LURASIDONE HYDROCHLORIDE 20 MG: 20 TABLET, COATED ORAL at 10:38

## 2023-12-10 RX ADMIN — POLYETHYLENE GLYCOL 3350 17 G: 17 POWDER, FOR SOLUTION ORAL at 10:37

## 2023-12-10 ASSESSMENT — ACTIVITIES OF DAILY LIVING (ADL)
ADLS_ACUITY_SCORE: 35
DRESS: SCRUBS (BEHAVIORAL HEALTH)
ADLS_ACUITY_SCORE: 35
ADLS_ACUITY_SCORE: 29
ORAL_HYGIENE: INDEPENDENT
ADLS_ACUITY_SCORE: 29
ADLS_ACUITY_SCORE: 29
ADLS_ACUITY_SCORE: 39
ADLS_ACUITY_SCORE: 29
HYGIENE/GROOMING: INDEPENDENT
ADLS_ACUITY_SCORE: 29
ADLS_ACUITY_SCORE: 39
ADLS_ACUITY_SCORE: 35
ORAL_HYGIENE: INDEPENDENT
ADLS_ACUITY_SCORE: 29
DRESS: SCRUBS (BEHAVIORAL HEALTH);INDEPENDENT;PROMPTS
HYGIENE/GROOMING: INDEPENDENT
ADLS_ACUITY_SCORE: 39

## 2023-12-10 NOTE — H&P
"ADMISSION PSYCHIATRIC EVALUATION  Connor Soares  YOB: 1990  MRN: [id number]  DATE OF ADMISSION:  12/7/2023  DATE OF INTERVIEW AND THIS SUMMARY:  12/10/2023    IDENTIFICATION  Patient is a 33 year old year old White male.  Connor Soares was admitted for psychosis from [facility] ED.    Connor Soares presented to the emergency department and was admitted 72 hr hold.    CHIEF COMPLAINT  \"I'm here to work on my mental health\"    HISTORY OF PRESENTING PROBLEM  HPI at initial presentation per ER/A&R notes:   Diagnostic Evaluation Consultation  Crisis Assessment     Patient Name: Connor Soares  Age:  33 year old  Legal Sex: male  Gender Identity: male  Pronouns:   Race: White  Ethnicity: Not  or   Language: English        Patient was assessed: In person      Patient location: [facility] EMERGENCY DEPARTMENT                             ED10     Referral Data and Chief Complaint  Connor Soares presents to the ED via EMS. Patient is presenting to the ED for the following concerns: Suicidal ideation, Depression, Significant behavioral change.   Factors that make the mental health crisis life threatening or complex are:  Pt presents to ED for concerns of suicidal ideation, decompensating schizoaffective disorder, psychotic symptoms. Pt states that his apartment has become inhabitable due to his mental health symptoms. Pt reports he is not taking care of himself. Pt has not taken psychiatric medications in several months per his report. Pt endorses active suicidal thoughts. He states he is experiencing commanding auditory hallucinations telling him to hurt himself. When asking if voices are telling him a specific plan, pt does not respond. Pt engaging in long periods of staring at  without responding to assessment questions. Pt requesting an enema from attending for unknown reasons. Pt appears to be significantly decompensated due to medication non compliance. Pt " unable to specify if he has mental health providers. Chart review shows pt has a , VM was left. Chart review also shows pt was a no show for a recent psychiatry appointment. Denies recent substance use..        Informed Consent and Assessment Methods  Explained the crisis assessment process, including applicable information disclosures and limits to confidentiality, assessed understanding of the process, and obtained consent to proceed with the assessment.  Assessment methods included conducting a formal interview with patient, review of medical records, collaboration with medical staff, and obtaining relevant collateral information from family and community providers when available.  : done        Patient response to interventions: acceptance expressed  Coping skills were attempted to reduce the crisis:  denies being able to engage in coping skills.     History of the Crisis   Hx of schizoaffective disorder bipolar type, ASD, MDD with psychotic features, ARETHA. Hx of psychotic episodes resulting in [facility], most recently 11/30/2019. Pt has hx of meeting with psychiatry and case management services. Pt a poor historian given current mental health status. Pt has hx of alcohol and marijuana abuse per chart review.      -Collateral information from the family/friend: patient declines involvement from family or case management     Per ED visit 11/18/2023: Patient grandmother called with significant concerns about patient. We do not have a release to give her information, but I explained we can take information from her. She is very concerned about a psychotic episode that has been going on for 1-1.5 months. She states that he has not taken his meds for at least a month. He is speaking of self harm, has become aggressive, and is not recognizing his brother or roommate. He has recently sold his television, and is not taking phone calls from his grandmother. The police have been contacted, and stopped by for  wellness check, but patient would not open the door. The police are unable to do anything without the patient letting them in. Grandmother states that he sits on the floor much of the time mumbling to himself, almost completely undressed. Routing to PCP and clinical nurse specialist for ideas on how to help patient. Grandmother asked for an update, and I did explain we can not give patient information without a release, and told her that process would involve patient signing a form that we can speak with her. She asked about civil commitment.     Today, patient reports that he is here for mental health and would like to restart his medications.  When  asked which medications he stated he wasn't a nurse or a doctor and wouldn't make that decision.  When asked about medications he's been on in the past he said he preferred to color and ended the interview and asked writer to leave.      PSYCHIATRIC REVIEW OF SYMPTOMS  Sleep: n/a       Appetite/Weight change: n/a  Libido: n/a      Energy level: n/a  Depression: n/a  Kandice: n/a    Anxiety/panic attacks: n/a.   OCD: n/a.    PTSD: Dn/a. Maltreatment and Abuse History: n/a.     Eating disorder: Denies previous restricting, binging, purging n/a.  Impulse control problems: Denies n/a.  ADHD: Denies previous diagnosis n/a.  Psychosocial stressors: n/a.                                    Current suicidal ideation: n/a.                        History of Suicide Attempts: n/a.  Self Injurious Behaviors: n/a.  History of Self-injurious behavior: n/a.  Property destruction: n/a.  Thoughts/threats to harm others: n/a.   History of violence: n/a.  Access to weapons:   n/a.       Able to contract for safety on the [place]zon: endorses safety on unit n/a.  Ability to complete daily tasks (i.e. Laundry, dishes): n/a.      ADMISSION MEDICATIONS:    Medications Prior to Admission   Medication Sig Dispense Refill Last Dose    acetaminophen (TYLENOL) 325 MG tablet Take 650 mg by mouth daily as  needed (arm pain)   More than a month    albuterol (PROAIR HFA/PROVENTIL HFA/VENTOLIN HFA) 108 (90 Base) MCG/ACT inhaler Inhale 2 puffs into the lungs 4 times daily as needed for shortness of breath or wheezing 18 g 0 More than a month    hydrOXYzine (ATARAX) 25 MG tablet Take 1 tablet (25 mg) by mouth every 6 hours as needed for anxiety 90 tablet 1 More than a month    senna-docusate (SENOKOT-S/PERICOLACE) 8.6-50 MG tablet Take 2 tablets by mouth 2 times daily 120 tablet 1 More than a month    vitamin D2 (ERGOCALCIFEROL) 32931 units (1250 mcg) capsule Take 1 capsule (50,000 Units) by mouth every 7 days 8 capsule 0 More than a month    QUEtiapine (SEROQUEL) 300 MG tablet Take 1 tablet (300 mg) by mouth At Bedtime 30 tablet 1        CHEMICAL HEALTH HISTORY  Patient denies issues with substances.    PAST PSYCHIATRIC HISTORY  Patient was previously diagnosed with Schizoaffective Bipolar Type, ASD, ARETHA, ADHD, Cannabis and Alcohol abuse.  Previous psychiatric admissions - last at [facility] in April 2023.  Previous commitment history - unknown.   Patient's current psychiatric provider:  Pt has missed his last appts.   Current therapist is:  unknown  Current [facility] : unknown .   Previous medication trials include  Per chart review Abilify, trazodone, vistaril, and Zyprexa.        FAMILY HISTORY  Psychiatric problems: Mother with Bipolar Disorder, Father with Depression  Chemical Dependency: Mother and Father with ZURI  Suicide Attempts/Completed Suicides: none known      MEDICAL HISTORY  ALLERGIES:   No Known Allergies    Primary Care Provider: Medicine, [facility]  Previous medical history: No past medical history on file.  Previous History of seizures or head injury: unkonwn  Surgeries: No past surgical history on file.  Current Pain Issues:  None    Vitals: /85 (BP Location: Left arm, Patient Position: Standing, Cuff Size: Adult Large)   Pulse 106   Temp 98.5  F (36.9  C) (Oral)   Resp 18    "Wt 137.2 kg (302 lb 8 oz)   SpO2 95%   BMI 41.03 kg/m       LABS:   Admission on 04/30/2023, Discharged on 05/16/2023   Component Date Value Ref Range Status    Estimated Average Glucose 05/02/2023 117  mg/dL Final    Hemoglobin A1C 05/02/2023 5.7 (H)  <5.7 % Final    Normal <5.7%   Prediabetes 5.7-6.4%    Diabetes 6.5% or higher     Note: Adopted from ADA consensus guidelines.    Vitamin D, Total (25-Hydroxy) 05/02/2023 16 (L)  20 - 75 ug/L Final    Hold Specimen 05/02/2023 JIC   Final    Hold Specimen 05/02/2023 JI   Final         Review of organ systems:     ROS: 10 point ROS neg other than the symptoms noted above in the HPI.    PHYSICAL EXAM: See consulting note from internal medicine physician and/or emergency department physician.    SOCIAL HISTORY   Patient is single, never  with no children.  He is disabled and \"I live where my home is\" , but suspect possible homelessness    MENTAL STATUS EXAMINATION  Appearance: awake, alert, appeared as age stated, dressed in scrubs and towel over head.  Poor hygiene  Attitude:  evasive, guarded, and suspicious  Eye Contact:  poor , looking around the room and avoidant of eye contact  Mood:  \"I need help\"  Affect:  mood incongruent, intensity is blunted, and constricted mobility   Speech:   coherent, clear, monotone  Language: fluent and intact in English  Psychomotor, Gait, Musculoskeletal:  no evidence of tardive dyskinesia, dystonia, or tics and intact station, gait and muscle tone :   Thought Process:  illogical and loosening of associations  Thought Content:   paranoid, denying current suicidal ideation. No homicidal or violent ideation.  Intrusive thoughts (content unknown)  Insight:   limited   Judgement:  limited  Oriented to:  time, person, and place   Attention Span and Concentration:  fair, no screenings or formal testing performed  Recent and Remote Memory: grossly intact based on history taking; no screenings or formal testing performed  Fund of " Knowledge: appropriate    ASSESSMENT:  This patient is a 33 year old year old male with a past psychiatric history of  schizoaffective disorder (bipolar type), ARETHA, social anxiety, ASD, ADHD, cannabis and alcohol abuse , who presented on 12/7/2023 for decompensating mental health , commanding AH, worsening SI, psychotic episode in the context of medication non adherence.  With regard to the current presentation, predisposing factors include genetic loading and chronic mental illness; precipitating factors include medication and treatment non-adherence; perpetuating factors include social isolation, limited coping skills. Substance use does not appear to be contributing to current presentation. Patient appears to have long standing ambiguity around mental health treatments with periods of non adherence and subsequent decompensation. Currently help seeking but paranoid, anxious.  Patient recommended for inpatient psychiatry for further symptom stabilization and medication management.      DIAGNOSIS  Schizoaffective Disorder Bipolar Type  Aretha  ASD  ADHD  Cannabis Use Disorder  Alcohol Use Disorder    PLAN:  The patient is admitted to station 10 for evaluation, observation, and treatment.  Medication changes include: Continue Latuda which was started in the ED  Legal considerations: 72hr hold started 12/9/23 0020 per Order history  Testing/Labs to complete:  Per IM  Consults ordered: , Groups  Estimated length of hospital stay: 6 days  Anticipated disposition: Home vs IRTS      Herbert Payne MD  12/10/2023 2:34 PM   Pager [phone]

## 2023-12-10 NOTE — TELEPHONE ENCOUNTER
3:13am - Paged Array for review to place on Station 10    3:34am - Array provider accepts for placement to Station 10/[place]    3:40am - Notified Unit of pt in the queue.    3:42am - Notified ED of pt placement. Unit will call for report when ready to admit.    Indicia Completed S.R    R: Patient placement to Station 10/[place]

## 2023-12-10 NOTE — TELEPHONE ENCOUNTER
R: [place]  Access Inpatient Bed Call Log 12/9/23 3:30PM   Intake has called facilities that have not updated the bed status within the last 12 hours.  (Statewide)                  [facility] is at capacity         [facility] is posting 0 beds. [phone]. 12/8 [facility] d/t pts acuity.  [facility] is posting 0 beds. Negative covid required.                 [facility] is posting 0 bed. Neg covid. No high school or [place]-[facility]. [phone]   [place] is posting 0 beds. [phone]   [facility] is posting 0 beds. [phone]   [facility] is posting 4 beds. Negative covid. [phone] Per call @8:13am 12/9 Pt not appropriate d/t facility restrictions.  [facility] is posting 0 beds           [facility] ([facility]) is posting 2 beds [phone]. 12/9 Pt declined d/t acuity.      [facility] is posting 2 beds. LOW acuity ONLY. Mixed unit 12+. Negative covid- [phone]   [facility] has 2 beds posted. No aggression. Negative Covid. Low acuity 12/9 Pt declined d/t acuity.     [facility] is posting 0 beds. Negative covid. [phone]    [facility] ([place]) is posting 6 beds. Low acuity only. Negative covid.  [phone]    [facility] is posting 1 bed. Low acuity. No current aggression. [phone]. 12/9 Pt declined d/t acuity.     [facility] ([place]) is posting 1 bed available. Negative covid.  [phone].       [facility] is posting 1 bed. Low acuity. 72 HH hold preferred. Negative covid required. [phone] Per call @7:42am, currently closed for the day.   [facility] ([place]) is posting 7 beds. Low acuity only. Negative covid.  [phone]    [facility] [place] is posting 0 beds.  Negative covid required.   Vol only, No history of aggression, violence, or assault. No sexual offenders. No 72  holds. [phone]. 12/9 Pt not appropriate d/t 72 .      [place]  [facility] is posting 7 beds. Negative covid required.  (Must have the cognitive ability to do programming. No aggressive or violent behavior or recent HX in the last 2 yrs. MH must be primary.) Always low acuity. [phone]    [facility] has 3 beds posted. Negative covid required.  Low acuity only. Violence and aggression capped.  [phone]    [facility] is posting 2 beds. Low acuity, Negative covid required. [phone] Per call @7:44am, no beds   [facility] is posting 5 beds. Unit is a combined unit (14+). No aggressive patients. Voluntary only. Must be accompanied by a guardian.  Negative covid. [phone]   12/9 Pt not appropriate d/t 72 HH.  [facility] posting 3 beds. Negative covid required.  [phone]    [facility] is posting 4 beds. Negative covid. LOW acuity. (No lines, drains, or tubes, oxygen, CPAP, IV, etc.) Must Have a Ride Home. [phone] Per call @7:46am, do not call unless pt has a ride home.   [facility] ([facility]) is posting 4 beds. Negative covid. (No. lines, drains, or tubes, oxygen, CPAP, IV, etc.). [phone] Per call @7:47 case-by-case, at [facility] for low acuity   [facility] is posting 12 beds. No covid test required. [phone] Per call @8:08am, no answer. 12/9 Pt not appropriate d/t 72 HH.       Pt remains on the work list pending appropriate bed availability.

## 2023-12-10 NOTE — PLAN OF CARE
Problem: Suicide Risk  Goal: Absence of Self-Harm  Outcome: Progressing     Pt presented as alert and oriented to place and self, however disoriented to situation during shift.  He was primarily withdrawn to his room, sleeping a majority of the day.  He was dressed appropriately, however appears somewhat unkempt.  Pt breakfast, however he ate both meals together at lunch time.  He was compliant with his/her scheduled medications  Pt did not request or require any PRNs on this shift.  VSS and no issues with bowel or bladder.  Pt did not participate in a nursing assessment.  He did not appear to be responding to any psychosis.  Pt denied pain or any acute physical health concerns.  No side effects to medications noted this shift.

## 2023-12-10 NOTE — ED NOTES
Patient refusing bedtime Seroquel dose.  Pt resting calmly in hallway bed.    Michi Patel RN on 12/9/2023 at 9:53 PM

## 2023-12-10 NOTE — PROGRESS NOTES
IN PATIENT ROOM:     IN PATIENT LOCKER: jacket, one pair of dark socks, one white sock, hiking boots. NO CELL PHONE. NO KEYS.    IN SECURITY: $2.00, EBT card, Insurance card, 's license, [employer] Debit card.     ADMISSION:  I am responsible for any personal items that are not sent to the safe or pharmacy. [facility] is not responsible for loss, theft or damage of any property in my possession.    Patient Signature _____________________ Date/Time _____________________    Staff Signature _______________________ Date/Time _____________________  2nd Staff person, if patient is unable/unwilling to sign  ___________________________________ Date/Time _____________________  DISCHARGE:  All personal items have been returned to me.    Patient Signature _____________________ Date/Time _____________________    Staff Signature _______________________ Date/Time _____________________

## 2023-12-10 NOTE — PLAN OF CARE
"Admission Note  Legal status: Voluntary    Presenting problem: Patient has been hearing voices telling him to harm himself. He has been off his medications for about 4 months now.     Assessment: Patient was calm and cooperative during the search process. He required redirection and presented as disorganized. Patient had blue medical gloves on when he arrived to the unit with bandages underneath. Writer attempted to assess the area but patient said the bandages should remain on so he doesn't loose blood. Writer asked when the bandages were put in place and he said \"2 days an IV\". Writer educated patient it was safe to remove the bandages. Skin was dry and intact. During the search patient asked multiple times how to put the scrubs on and in which order he should put things on. After he was provided fresh scrubs he came out of the restroom with the blue gown on underneath the new scrubs. Writer had to educated patient a few times and go over each step since he came out twice with the blue gown on. Once on the unit, writer oriented him to the unit, and went over his legal rights with him. Patient declined to do the admission assessment. He declined to sign the voluntary paperwork because he thought that he might become involuntary \"and then held here\". Writer educated him that this would not happen and went over again what the paperwork was for but he continued to decline signing it. After directing him to his room he came back out and asked what he should be doing during each hour \"Do you want me to sleep or should I be out here? Should I brush my teeth? Where should I stand?\". Patient required lots of reassurance and redirection.    He didn't appear to be responding to internal stimuli. Patient stated that he can contract for safety while on the unit.    Hx of mental health & previous hospitalization: Yes, has had prior hospitalizations    Hx/current medical concern: Schizoaffective bipolar, autism spectrum " disorder, psychosis.    Pain: No    Hx of chemical dependency and substance abuse: No    Allergies: NKA    UTox: Negative     Orientation to unit: Complete

## 2023-12-10 NOTE — PLAN OF CARE
INITIAL PSYCHOSOCIAL ASSESSMENT AND NOTE    Information for assessment was obtained from:       []Patient     []Parent     []Community provider    [x]Hospital records   []Other     []Guardian  Pt was sleeping and did not wish to engage with conversation and the follow information was gathered from review.      Presenting Problem:  Patient is a 33 year old male who uses he/him. Patient was admitted to [facility] on 12/7/2023 Station 10N on 72 hour hold.     Presenting issues and presentation for admit: Per Chart review: Pt presents to ED for concerns of suicidal ideation, decompensating schizoaffective disorder, psychotic symptoms. Pt states that his apartment has become inhabitable due to his mental health symptoms. Pt reports he is not taking care of himself. Pt has not taken psychiatric medications in several months per his report.      The following areas have been assessed:    History of Mental Health and Chemical Dependency:  Mental Health History:  Patient has a historical diagnosis of Schizoaffective Bipolar Type, ASD, ARETHA, ADHD, Cannabis and Alcohol abuse .   The patient has not a history of suicide attempts.   Patient  has not a history of engaged in non-suicidal self-injury.     Previous psychiatric hospitalizations and treatments (including outpatient, residential, and inpatient care:  Pt has had several mental health admissions most recent was at [facility] in April 2023.     Substance Use History  Hx of alcohol use      Patient's current relationship status is   single.   Patient reported having zero child([place]).       Family Description ([place], significant information and events, Family Psychiatric History):   Pt has family in the area including mother, brother and grandmother.   Psychiatric problems: Mother with Bipolar Disorder, Father with Depression  Chemical Dependency: Mother and Father with ZURI    Significant Medical issues, Life events  or Trauma history:   Unable to access      Living Situation:  Patient is not able to disclose his housing situation.       Educational Background:    Patient's highest education level was high school graduate. Patient reports they are  able to understand written materials.     Occupational and Financial Status:     Patient is currently disabled.  Patient reports  income is obtained through SSDI disability.      Occupational History: Unemployed    Legal Status:     Commitment History: None       Service History: None    Ethnic/Cultural/Spiritual considerations:   The patient describes their cultural background as White/, heterosexual, male.  Contextual influences on patient's health include housing instability and lack of social support.   Patient identified their preferred language to be English. Patient reported they do not need the assistance of an .     Social Functioning (organizations, interests, support system):   In their free time, patient reports they like to Playing video games, listening to music, going for walks.         Current Treatment Providers are:    /ACT Team:  Case Rena GomesGerardofredrick Rossi Mireya Theodore    [phone]   Fax: [phone]        Patient will have psychiatric assessment and medication management by the psychiatrist. Medications will be reviewed and adjusted per DO/MD/APRN CNP as indicated. The treatment team will continue to assess and stabilize the patient's mental health symptoms with the use of medications and therapeutic programming. Hospital staff will provide a safe environment and a therapeutic milieu. Staff will continue to assess patient as needed. Patient will participate in unit groups and activities. Patient will receive individual and group support on the unit.      CTC will do individual inpatient treatment planning and after care planning. CTC will discuss options for increasing community supports with the patient. [facility] will coordinate  with outpatient providers and will place referrals to ensure appropriate follow up care is in place.

## 2023-12-11 LAB
HBA1C MFR BLD: 5.2 %
VIT D+METAB SERPL-MCNC: 33 NG/ML (ref 20–50)

## 2023-12-11 PROCEDURE — 250N000013 HC RX MED GY IP 250 OP 250 PS 637: Performed by: FAMILY MEDICINE

## 2023-12-11 PROCEDURE — 250N000013 HC RX MED GY IP 250 OP 250 PS 637: Performed by: PSYCHIATRY & NEUROLOGY

## 2023-12-11 PROCEDURE — 99233 SBSQ HOSP IP/OBS HIGH 50: CPT | Performed by: PSYCHIATRY & NEUROLOGY

## 2023-12-11 PROCEDURE — 124N000002 HC R&B MH UMMC

## 2023-12-11 PROCEDURE — 250N000013 HC RX MED GY IP 250 OP 250 PS 637: Performed by: STUDENT IN AN ORGANIZED HEALTH CARE EDUCATION/TRAINING PROGRAM

## 2023-12-11 PROCEDURE — 250N000013 HC RX MED GY IP 250 OP 250 PS 637: Performed by: EMERGENCY MEDICINE

## 2023-12-11 RX ORDER — OLANZAPINE 10 MG/2ML
10 INJECTION, POWDER, FOR SOLUTION INTRAMUSCULAR 3 TIMES DAILY PRN
Status: DISCONTINUED | OUTPATIENT
Start: 2023-12-11 | End: 2023-12-27 | Stop reason: HOSPADM

## 2023-12-11 RX ORDER — VITAMIN B COMPLEX
50 TABLET ORAL DAILY
Status: DISCONTINUED | OUTPATIENT
Start: 2023-12-11 | End: 2023-12-27 | Stop reason: HOSPADM

## 2023-12-11 RX ORDER — AMOXICILLIN 250 MG
2 CAPSULE ORAL 2 TIMES DAILY
Status: DISCONTINUED | OUTPATIENT
Start: 2023-12-11 | End: 2023-12-27 | Stop reason: HOSPADM

## 2023-12-11 RX ORDER — ALBUTEROL SULFATE 90 UG/1
2 AEROSOL, METERED RESPIRATORY (INHALATION) 4 TIMES DAILY PRN
Status: DISCONTINUED | OUTPATIENT
Start: 2023-12-11 | End: 2023-12-27 | Stop reason: HOSPADM

## 2023-12-11 RX ORDER — OLANZAPINE 5 MG/1
5-10 TABLET ORAL 3 TIMES DAILY PRN
Status: DISCONTINUED | OUTPATIENT
Start: 2023-12-11 | End: 2023-12-27 | Stop reason: HOSPADM

## 2023-12-11 RX ORDER — POLYETHYLENE GLYCOL 3350 17 G/17G
17 POWDER, FOR SOLUTION ORAL DAILY PRN
Status: DISCONTINUED | OUTPATIENT
Start: 2023-12-11 | End: 2023-12-27 | Stop reason: HOSPADM

## 2023-12-11 RX ADMIN — BUSPIRONE HYDROCHLORIDE 5 MG: 5 TABLET ORAL at 20:08

## 2023-12-11 RX ADMIN — LURASIDONE HYDROCHLORIDE 20 MG: 20 TABLET, COATED ORAL at 09:36

## 2023-12-11 RX ADMIN — BUSPIRONE HYDROCHLORIDE 5 MG: 5 TABLET ORAL at 09:36

## 2023-12-11 RX ADMIN — SENNOSIDES AND DOCUSATE SODIUM 2 TABLET: 8.6; 5 TABLET ORAL at 20:08

## 2023-12-11 RX ADMIN — HYDROXYZINE HYDROCHLORIDE 25 MG: 25 TABLET, FILM COATED ORAL at 18:30

## 2023-12-11 RX ADMIN — QUETIAPINE 300 MG: 100 TABLET ORAL at 20:08

## 2023-12-11 RX ADMIN — POLYETHYLENE GLYCOL 3350 17 G: 17 POWDER, FOR SOLUTION ORAL at 09:36

## 2023-12-11 ASSESSMENT — ACTIVITIES OF DAILY LIVING (ADL)
ADLS_ACUITY_SCORE: 29
ADLS_ACUITY_SCORE: 29
DRESS: SCRUBS (BEHAVIORAL HEALTH);INDEPENDENT
ADLS_ACUITY_SCORE: 29
HYGIENE/GROOMING: INDEPENDENT
ADLS_ACUITY_SCORE: 29
ORAL_HYGIENE: INDEPENDENT
HYGIENE/GROOMING: INDEPENDENT
ADLS_ACUITY_SCORE: 29
ORAL_HYGIENE: INDEPENDENT
ADLS_ACUITY_SCORE: 29
DRESS: SCRUBS (BEHAVIORAL HEALTH)

## 2023-12-11 NOTE — DISCHARGE INSTRUCTIONS
Behavioral Discharge Planning and Instructions    Summary: You were admitted to [facility] on 12/7/2023 due to psychosis and suicidal ideation. You were treated by Chante Snyder DO, Andrew Rodriguez MD, and Herbert Payne MD and provisionally discharged on 12/27/2023 to Home.    You were dually committed to the [facility] and the [facility] of Human Services on 12/21/2023.  You were also court ordered to take the medications the doctor ordered for you. You are being discharged on a Provisional Discharge Agreement which shall remain in effect for the duration of the Commitment.  Your Commitment expires on 6/21/2024.      Continue to follow with Mental Health  - Julito Booth [place] (phone: [phone]).     Main Diagnosis:   Schizoaffective disorder, bipolar type (H)  ARETHA  ASD  ADHD per chart  Cannabis use per chart  Alcohol use per chart  Constipation, unspecified constipation type  Hx of Vit D deficiency   Hypokalemia, improved  Leukocytosis, improved     Health Care Follow-up:     Appointment type: Primary Care  Date/time: Wednesday, January 10th, 2024 @ 9:30 AM Virtual   Provider: Yony Ro MD  Address: [facility] [address] [place] [address]  Phone: [phone]  Fax: [phone]  Note:  You will receive a [employer] visit link to attend appointment via telehealth. Provider has no in person availability within requested timeframe.     Appointment type: Psychiatry  Date/time: Wednesday, January 17th, 2024 @ 11:30 AM In person  Provider:  TRINH Ronquillo  Address: [facility] [address] [place] [address]  Phone: [phone]  Fax: [phone]     **HUC to fax AVS upon discharge, please.    Attend all scheduled appointments with your outpatient providers. Call at least 24 hours in advance if you need to reschedule an appointment to ensure continued access to your  "outpatient providers.     Major Treatments, Procedures and Findings:  You were provided with: a psychiatric assessment, assessed for medical stability, medication evaluation and/or management, group therapy, individual therapy, and milieu management    Symptoms to Report: Feeling more aggressive, increased confusion, losing more sleep, mood getting worse, or thoughts of suicide.    Early warning signs can include: Increased depression or anxiety sleep disturbances increased thoughts or behaviors of suicide or self-harm  increased unusual thinking, such as paranoia or hearing voices.    Safety and Wellness: Take all medicines as directed. Make no changes unless your doctor suggests them. Follow treatment recommendations. Refrain from alcohol and non-prescribed drugs. Ask your support system to help you reduce your access to items that could harm yourself or others. If there is a concern for safety, call 145.    Resources:   General Mental Health Resources:   National [place] on Mental Illness (ADRIEL) [place]: Connect for help, to navigate the mental health system, and for support and for resources. Call: 5-175-YRCM-Helps / 4-731-211-3040  Crisis Text Line: The 24/7 emergency service is available if you or someone you know is experiencing a psychiatric or mental health crisis. Text: \"[place]\" to 406948  [facility]: Are you an adult needing support? Talk to a specialist who has firsthand experience living with a mental health condition. Call: 631.830.4988  Text: \"Support\" to 17068  [facility].org/support/[place]-[facility]/  National Suicide Prevention Lifeline: The 24/7 lifeline provides support when in distress, has prevention and crisis resources for you or your loved ones, and resources for professionals. Call 4-397-001-TALK (0558)  Peer Support Connection Warmlines: Vpfd-xx-lcqa telephone support that s safe and supportive. Open 5 p.m. to 9 a.m. Call or text: 1-523.453.3634   COVID Cares Stress Phone " Support Service. Any [place] experiencing stress can call 073-PISW1JY (874-982-0456) for free telephone support from 9am to 9pm every day. The service is a collaboration with volunteers from the [place] Psychiatric Society, the [place] Psychological Association, the [place] Black Psychologists, and Mental [facility] [place]. The free service is also accessible at [employer] where searchers can also find psychiatric and mental health services availability and real-time Substance Use Disorder Treatment program openings.  Adult Rehabilitative Mental Health Services (ARM): https://[place].gov/dhs/partners-and-providers/policies-procedures/adult-mental-health/adult-rehabilitative-mental-health-services/Psychiatric hospital-certified-providers/  [facility] Response - Adult 876 692-6948    Outpatient Psychiatry/Therapy Resources:   [facility] - (phone: 531.659.3075) https://www.[employer]/care/specialty/mental-behavioral-health/  https://www.[employer]/care/find/doctors/psychologists/  [facility] - (phone: 4-401-DCCCPOEU) https://www.[facility].org/treatments/Counseling-Adult  [facility] - (phone: 136.246.5971) https://[facility]/  [facility] - (phone: 813.985.7309)  https://[facility]/  [facility] - (phone: 1-241.643.4838) https://www.[employer]/  [facility] - (phone: 683.626.5763) https://www.[facility].com/  [facility] - (phone: 111.453.8292) https://[employer]/  [facility] - (phone: 903.727.5046) https://walkin.org/    General Medication Instructions:   See your medication sheet(s) for instructions.   Take all medicines as directed.  Make no changes unless your doctor suggests them.   Go to all your doctor visits.  Be sure to have all your required lab tests. This way, your medicines can be refilled  on time.  Do not use any drugs not prescribed by your doctor.  Avoid alcohol.    Advance Directives:   Scanned document on file with Node[facility]? No scanned doc  Is document scanned? No. Copy Requested.  Honoring Choices Your Rights Handout: Informed and given  Was more information offered? Pt declined    The Treatment team has appreciated the opportunity to work with you. If you have any questions or concerns about your recent admission, you can contact the unit which can receive your call 24 hours a day, 7 days a week. They will be able to get in touch with a Provider if needed. The unit number is [phone].

## 2023-12-11 NOTE — PLAN OF CARE
Pt is requesting his cloths. Pt came to unit in scrubs. Called [facility] and [facility] and they do not have them, . Pt states he is missing blue short sleeved shirt. Fordyce like basketball shorts grey/blue and sweat pants-navy blue. This info was passed on to NM and CM.   These items were found in the BEC.

## 2023-12-11 NOTE — PLAN OF CARE
BEH IP Unit Acuity Rating Score (UARS)  Patient is given one point for every criteria they meet.    CRITERIA SCORING   On a 72 hour hold, court hold, committed, stay of commitment, or revocation 1    Patient LOS on BEH unit exceeds 20 days 0  LOS: 1   Patient under guardianship, 55+, otherwise medically complex, or under age 11 0   Suicide ideation without relief of precipitating factors 1   Current plan for suicide 0   Current plan for homicide 0   Imminent risk or actual attempt to seriously harm another without relief of factors precipitating the attempt 0   Severe dysfunction in daily living (ex: complete neglect for self care, extreme disruption in vegetative function, extreme deterioration in social interactions) 1   Recent (last 7 days) or current physical aggression in the ED or on unit 0   Restraints or seclusion episode in past 72 hours 0   Recent (last 7 days) or current verbal aggression, agitation, yelling, etc., while in the ED or unit 0   Active psychosis 1   Need for constant or near constant redirection (from leaving, from others, etc).  0   Intrusive or disruptive behaviors 0   TOTAL 4

## 2023-12-11 NOTE — CARE PLAN
12/11/23 1500   General Information   Has Not Attended OT as of: 12/11/23     Pt has not attended scheduled occupational therapy sessions. Encourage attendance and participation. Pt will be given self-assessment form, and OT staff will explain the purpose of including them in their treatment plan and offer options for meeting their needs.

## 2023-12-11 NOTE — PROGRESS NOTES
"[facility] [place]   Psychiatric Progress Note  Hospital Day: 1        Interim History:   The patient's care was discussed with the treatment team during the daily team meeting and/or staff's chart notes were reviewed.  Staff report patient has been disoriented, calm and cooperative, not attending groups, did play guitar and watched TV, denying SI, HI or VH, reporting anxiety also some AH that are command in nature, reporting rash on abdomen but declining assessment, eating/drinking adequately, taking medications as prescribed, slept 7 hours.     Upon interview, the patient was sleeping very hard, difficult to wake up, writer informed patient they would be back later to check in when more awake. Writer returned later in morning and patient was noted to be sitting up and awake and alert. Writer introduced self, inquired about pt feeling so tired in AM, he states \"I think I am quite alert actually\". When asked about what brought him to the hospital he stated \"I am working on taking care of myself, so I am here taking care of myself\". Provided orientation that he is on a mental health unit, he was not able to provide specific examples of mental health symptoms he was experiencing. When going over psychiatric ROS, does endorse feeling \"sad at times\", denies SI \"none at the moment\" but does report having some self harm thoughts in recent days. Denies having any HI or SIB. He will hear voices \"sometimes\" denies hearing currently, denies VH. When asked about paranoia he did not directly respond and stated that he cannnot return to where he was living as it will cause \"irreparable harm\" but would not give specifics. He has not been taking his medications, does state he is okay resuming medications and continue current doses as he is \"working on feeling better\" and states current medications \"are not bad\". Reviewed his legal status being on 72HH and that he appears help seeking, he does agree to " "sign in voluntarily and work with writer and his treatment to get better and determine when he is safe and ready for discharge. He does indicate he may need alternative option for disposition due to feeling unsafe at previous place. Writer will discuss with CTC. No additional concerns.     Following interview pt went to desk and went over consent for treatment forms with RN and signed in as voluntary patient.            Medications:      busPIRone  5 mg Oral BID    lurasidone  20 mg Oral Daily    polyethylene glycol  17 g Oral Daily    QUEtiapine  300 mg Oral At Bedtime          Allergies:   No Known Allergies       Labs:   No results found for this or any previous visit (from the past 48 hour(s)).       Psychiatric Examination:     BP (!) 133/93 (BP Location: Left arm, Patient Position: Sitting, Cuff Size: Adult Large)   Pulse 108   Temp 98.7  F (37.1  C) (Oral)   Resp 16   Wt 137.2 kg (302 lb 8 oz)   SpO2 95%   BMI 41.03 kg/m    Weight is 302 lbs 8 oz  Body mass index is 41.03 kg/m .    Orthostatic Vitals         Most Recent      Standing Orthostatic BP --  Comment: standing not done-  pt was moving too much 12/11 0835            Appearance: awake, alert and adequately groomed  Attitude:   mostly cooperative, some guardedness   Eye Contact:  fair  Mood:   \"sad at times\"  Affect:  mood congruent  Speech:  clear, coherent and normal prosody  Language: fluent and intact in English  Psychomotor, Gait, Musculoskeletal:  no evidence of tardive dyskinesia, dystonia, or tics  Thought Process:  disorganized  Associations:  no loose associations  Thought Content:   denies current SI or HI, denies current AH but states having some recently, denies VH  Insight:  limited  Judgement:  limited  Oriented to:   person, place, month, year, not day of week   Attention Span and Concentration:  fair  Recent and Remote Memory:  limited  Fund of Knowledge:  appropriate           Precautions:     Behavioral Orders   Procedures    " Code 1 - Restrict to Unit    Routine Programming     As clinically indicated    Status 15     Every 15 minutes.    Suicide precautions     Patients on Suicide Precautions should have a Combination Diet ordered that includes a Diet selection(s) AND a Behavioral Tray selection for Safe Tray - with utensils, or Safe Tray - NO utensils            Diagnoses:      Schizoaffective disorder, bipolar type (H)  ARETHA  ASD  ADHD per chart  Cannabis use per chart  Alcohol use per chart  Constipation, unspecified constipation type  Hx of Vit D deficiency   Hypokalemia  Leukocytosis         Assessment & Plan:   Assessment and hospital summary:  33 y M with history of schizoaffective disorder, anxiety, autism and substance use who presented to the ED via EMS for SI and psychosis in context of medication non-adherence. Medically cleared in ED, was initially agreeable to remain in ED then requested to leave, placed on 72HH, started on lurasidone to target mood and psychosis and pt requested to try a new medication. Admitted to [facility] under 72HH. UDS negative, other admission labs ordered. PTA quetiapine continued to furhter target psychosis. Pt also on buspirone for anxiety and some Miralax that was started in ED for constipation, he also received enema in ED per his request. Has been on senna/colace PTA, this was resumed and miralax moved to PRN. Pt also requested to be on vitamin D, has not had recent level, this was ordered, standard daily dosing ordered at this time and will determine if patient again requires high dose replacement as he has in the past. On 12/11 patient agreeable to signing in as voluntary patient. Also reports needing a new place to discharge to as not feeling safe in previous residence, CTC to explore. Continues to have some disorganization along with paranoia and reports of AH, will continue to adjust medications for stabilization as indicated/tolerated.     Psychiatric treatment/interventions:  Medications:    -continue PTA quetiapine 300mg at bedtime for psychosis and mood  -continue lurasidone 20mg daily started in ED for mood and psychosis, will work to optimize and move to monotherapy as patient stabilizes  -start Vit D3 50mcg daily per pts request, Vit D level ordered  -continue buspirone 5mg BID for anxiety     -PRN hydroxyzine 25mg every 4 hours anxiety  -PRN lorazapam 1mg every 4 hours PO or IM for agitation   -PRN olanzapine 5-10mg PO or 10mg IM TID for agitation, first line  -PRN trazodone 50mg at bedtime for sleep       -pt agrees to sign in voluntary today, will discontinue 72HH at this time      The risks, benefits, alternatives and side effects have been discussed and are understood by the patient.     Laboratory/Imaging: Vit D level ordered, along with repeat K+, WBC and added HgbA1c to complete admission labs; all other labs since admission reviewed      Patient will be treated in therapeutic milieu with appropriate individual and group therapies as described.     Medical treatment/interventions:  Medical concerns: Pt requests daily Vit D, has hx of Vit D deficiency requiring high dose replacement, ordered 50mcg daily and Vit D level as above. Pt also has hx of constipation, was on scheduled Senna/Colace PTA, will resume and move miralax to PRN and continue to monitor. Pt also with hypokalemia in ED and leukoctyosis, will repeat labs in AM and address as indicated.       Disposition Plan   Reason for ongoing admission: poses an imminent risk to self and is unable to care for self due to severe psychosis or robert  Discharge location:  TBD, pt reports not feeling safe to return to previous residence, may consider IRTS  Discharge Medications: not ordered  Follow-up Appointments: not scheduled  Legal Status:  Admitted on 72HH, agrees to sign in voluntary today     >50 min total time that was spent in counseling and coordination of care with staff, reviewing medical record, educating patient about treatment  options, side effects and benefits and alternative treatments for medications, providing supportive therapy and redirection regarding above symptoms.     This document is created with the help of Dragon dictation system.  All grammatical/typing errors or context distortion are unintentional and inherent to software.    Patient has been seen and evaluated by Chante alexander DO.

## 2023-12-11 NOTE — PROVIDER NOTIFICATION
12/11/23 1242   Individualization/Patient Specific Goals   Patient Personal Strengths expressive of emotions;interests/hobbies;motivated for treatment;positive attitude;resourceful;stable living environment   Patient Vulnerabilities history of unsuccessful treatment;lacks insight into illness;limited support system;substance abuse/addiction   Behavioral Team Discussion   Participants Chante Snyder DO; Kindra Leiva RN; LUANNE Prado   Progress Minimal   Anticipated length of stay 7-10 days   Continued Stay Criteria/Rationale Connor presents with concern for SI, decompensating schizoaffective disorder with worsening symptoms of psychosis (command AH), medication non-adherence, and poor self-care. He requires symptom stabilization, medication management, and supportive discharge plan.   Medical/Physical Requesting Vitamin D - provider plans to check level.   Precautions SI   Plan Gather collateral information, request Connor reconsider signing in as voluntary patient, otherwise considering pursuing petition for commitment tomorrow 12/12/2023. Consider possible adjustments to medications. Work to increase outpatient supports while considering alternative placement options per Connor's request.   Anticipated Discharge Disposition home or self-care;group home;IRTS

## 2023-12-11 NOTE — PLAN OF CARE
Problem: Suicide Risk  Goal: Absence of Self-Harm  Outcome: Progressing     Pt presented as alert and oriented to place and self, however disoriented to situation during shift.  Pt also showed impaired short-term memory on more than one occasion, regarding conversations related to wanting Vitamin D with RN writer and then asking another RN within minutes of the same conversation as well as not remembering that RN writer gave him his morning medications and insisting that they were HS medications until RN writer was going to show pt that these medications were given to his via his MAR.  He was intermittently visible during the day.  He was dressed appropriately, however appears somewhat unkempt.  Pt also received an order to wear his own clothing, should he have person items in his locker.  Pt is eating and drinking adequately.  He was compliant with his/her scheduled medications.  Pt did not request or require any PRNs on this shift.  VSS and no issues with bowel or bladder.  Pt did not participate in a nursing assessment.  He did not appear to be responding to any psychosis.  Pt denied pain or any acute physical health concerns.  No side effects to medications noted this shift.

## 2023-12-11 NOTE — PLAN OF CARE
Problem: Suicide Risk  Goal: Absence of Self-Harm  Intervention: Promote Psychosocial Wellbeing  Recent Flowsheet Documentation  Taken 12/10/2023 1600 by Sasha Iqbal RN  Family/Support System Care: support provided     Goal Outcome Evaluation:    Plan of Care Reviewed With: patient      RN: Pt alert and disoriented to situation. Pt was calm and cooperative, didn't attend group this shift. Pt was observed doing art work, playing guitar in the DR or watching TV in the lounge for most of the shift, but was socially withdrawn to self, no interactions with peers. Pt presented as flat/blunted affect, mood was calm and depressed.  Pt denied SI/SIB/HI, visual hallucination. Pt endorsed anxiety earlier during this shift, received PRN Atarax with good effect. Pt also reported auditory hallucination, stated: the voice comes and goes frequently, and the voice tells me to do what I am not supposed to do. Pt contracted for safety on the unit. Pt was medication compliant, no reported or observed side effect. When writer asked pt if he has any skin issues, pt said that he has rash in his abdominal area, but refused assessment. Pt cried, stated: I don't want to talk about that, I don't want anyone to look at, it made me sad. Pt is eating and drinking adequately, ate diner and snacks in the DR. Pt denied pain or physical discomfort, reported no issues with bowel and bladder. Continue with POC.

## 2023-12-11 NOTE — PLAN OF CARE
Team Meeting: ~10am in OT or conference room  Attending Provider: Chante Snyder DO   Voicemail Code: See desk phone  Team Note Due: Monday  Next Steps:   Connect with Connor regarding interests for possible alternative discharge location.    Assessment/Intervention/Current Symtoms and Care Coordination:  -Refer to psychosocial completed on 12/10/2023 for assessment/social functioning  -Chart review  -Team meeting - Connor presents as disorganized and seems to have trouble tracking information. He is requesting to have Vitamin D with morning medications and provider plans to enter this while also planning to check level. Team plans to encourage Connor to sign in as voluntary, otherwise will likely pursue petition for commitment tomorrow 12/12.  -Connor signed in as voluntary.  -Charge RN notes Connor is requesting access to personal clothing, though team does not have clothing in his locker. Charge RN eventually was able to locate Connor's belongings in the BEC.   -Writer attempted to meet with Connor to introduce self and provide check-in, however he and his roommate were asleep - writer will return at a later time today or tomorrow.   Current Symptoms include the following: disorganization  Precautions: SI    Discharge Plan or Goal:  Pending stabilization & development of a safe discharge plan.  Considerations include: Return home with outpatient providers    Barriers to Discharge:  Connor presents with concern for SI, decompensating schizoaffective disorder with worsening symptoms of psychosis (command AH), medication non-adherence, and poor self-care. He requires symptom stabilization, medication management, and supportive discharge plan.     Referral Status:  Referrals TBD    Legal Status:  Patient is voluntary (as of 12/11/2023)    Contacts:  Family/Friends:  Mom - Chante Antony (phone: [phone])    Outpatient Providers:   - Julito Booth [place] (phone: [phone], fax:  [phone])  Psychiatrist/Medication Management Provider - TRINH Ronquillo of [facility] (phone: [phone])  Primary Care Provider - Yony Ro MD of [facility] (phone: [phone])    Upcoming Meetings/Important Dates:  Court (commitment/guardianship,etc.):  N/A    Interview/assessment/care conference:  N/A    Aftercare/outpatient appointments:  N/A    Rationale for SIO/No Roommate Order:  Patient is not on SIO.  Patient has current roommate.

## 2023-12-12 LAB
GLUCOSE BLDC GLUCOMTR-MCNC: 89 MG/DL (ref 70–99)
POTASSIUM SERPL-SCNC: 4.2 MMOL/L (ref 3.4–5.3)
WBC # BLD AUTO: 6.9 10E3/UL (ref 4–11)

## 2023-12-12 PROCEDURE — 250N000013 HC RX MED GY IP 250 OP 250 PS 637: Performed by: EMERGENCY MEDICINE

## 2023-12-12 PROCEDURE — 99232 SBSQ HOSP IP/OBS MODERATE 35: CPT | Performed by: PSYCHIATRY & NEUROLOGY

## 2023-12-12 PROCEDURE — 250N000013 HC RX MED GY IP 250 OP 250 PS 637: Performed by: FAMILY MEDICINE

## 2023-12-12 PROCEDURE — 84132 ASSAY OF SERUM POTASSIUM: CPT | Performed by: PSYCHIATRY & NEUROLOGY

## 2023-12-12 PROCEDURE — 124N000002 HC R&B MH UMMC

## 2023-12-12 PROCEDURE — 36415 COLL VENOUS BLD VENIPUNCTURE: CPT | Performed by: PSYCHIATRY & NEUROLOGY

## 2023-12-12 PROCEDURE — 250N000013 HC RX MED GY IP 250 OP 250 PS 637: Performed by: PSYCHIATRY & NEUROLOGY

## 2023-12-12 PROCEDURE — 250N000013 HC RX MED GY IP 250 OP 250 PS 637: Performed by: STUDENT IN AN ORGANIZED HEALTH CARE EDUCATION/TRAINING PROGRAM

## 2023-12-12 PROCEDURE — 85048 AUTOMATED LEUKOCYTE COUNT: CPT | Performed by: PSYCHIATRY & NEUROLOGY

## 2023-12-12 RX ORDER — POLYETHYLENE GLYCOL 3350 17 G/17G
17 POWDER, FOR SOLUTION ORAL DAILY
Status: DISCONTINUED | OUTPATIENT
Start: 2023-12-12 | End: 2023-12-27 | Stop reason: HOSPADM

## 2023-12-12 RX ADMIN — HYDROXYZINE HYDROCHLORIDE 25 MG: 25 TABLET, FILM COATED ORAL at 21:43

## 2023-12-12 RX ADMIN — BUSPIRONE HYDROCHLORIDE 5 MG: 5 TABLET ORAL at 19:00

## 2023-12-12 RX ADMIN — Medication 50 MCG: at 08:37

## 2023-12-12 RX ADMIN — LURASIDONE HYDROCHLORIDE 20 MG: 20 TABLET, COATED ORAL at 08:37

## 2023-12-12 RX ADMIN — QUETIAPINE 300 MG: 100 TABLET ORAL at 19:00

## 2023-12-12 RX ADMIN — BUSPIRONE HYDROCHLORIDE 5 MG: 5 TABLET ORAL at 08:37

## 2023-12-12 RX ADMIN — OLANZAPINE 10 MG: 5 TABLET, FILM COATED ORAL at 21:43

## 2023-12-12 RX ADMIN — SENNOSIDES AND DOCUSATE SODIUM 2 TABLET: 8.6; 5 TABLET ORAL at 08:37

## 2023-12-12 RX ADMIN — LORAZEPAM 1 MG: 1 TABLET ORAL at 21:43

## 2023-12-12 RX ADMIN — SENNOSIDES AND DOCUSATE SODIUM 2 TABLET: 8.6; 5 TABLET ORAL at 19:00

## 2023-12-12 ASSESSMENT — ACTIVITIES OF DAILY LIVING (ADL)
ADLS_ACUITY_SCORE: 29
ORAL_HYGIENE: INDEPENDENT
ADLS_ACUITY_SCORE: 29
HYGIENE/GROOMING: INDEPENDENT
ADLS_ACUITY_SCORE: 29
ADLS_ACUITY_SCORE: 29
DRESS: INDEPENDENT;SCRUBS (BEHAVIORAL HEALTH)
ADLS_ACUITY_SCORE: 29
ADLS_ACUITY_SCORE: 29

## 2023-12-12 NOTE — PLAN OF CARE
"He is active on the unit, yet does not interact with others.  He is delayed in speech, cognition and has difficulty maintain eye contact and staying on topic in conversations.  He denies pain/discomfort and refused his afternoon miralax.  He denies difficulties going to the bathroom when asked.  He displays confused, disorganized thought process by having difficulty expressing himself and in decision making.  He states that he is anxious and was receptive of taking medication and then changes his mind and said, \" no, not right now.\"  He denies suicidal and homicidal ideation and refused to answer if experiencing any hallucinations.  [facility] for long periods of time appearing to be internally preoccupied upon observation.  Education given that he could get a roommate at some point in time and verbalized understanding.  He refused to go to his room when DRAKE team was here to talk with another patient, stating, \" I'm just sitting here,\" despite education on safety concerns.  He inspects his medications and writer had to explain them to him.  States what will happen if he refuses them, informed he can refuse the medications, yet could end up in a longer hospital stay.  He then said, \" I'll take them,\" and thanked writer, and took his medications.  He came up to the desk and states that, \" I feel like I'm sprilling down, when I get like this I might strike.\"  PRN ativan, zyprexa, and atarax was given, required writer redirection to lay down in bed and rest.  He was given support for coming to staff, states will let us know if needing anything.  He agrees to come to staff for concerns.                   "

## 2023-12-12 NOTE — CONSULTS
Consulted by Chante Snyder to run a test claim for Lurasidone tabs.    Patient has primary pharmacy benefits through [employer] Part D and secondary pharmacy benefits through [place] Medicaid. Per insurance, the following are covered and preferred under the patient's plans:     Luradisone tabs - $1.45       Please feel free to contact me with any other test claims, prior authorizations, or insurance questions regarding outpatient medications.     Thanks!      Shruthi Perez [facility]  Discharge Pharmacy Liaison  [facility]  Pronouns: She/Her/Hers    Securely message with Vocera, Epic Secure Chat, or [employer]  Phone: [phone]  Fax: [phone]  [email or web address]

## 2023-12-12 NOTE — PLAN OF CARE
BEH IP Unit Acuity Rating Score (UARS)  Patient is given one point for every criteria they meet.    CRITERIA SCORING   On a 72 hour hold, court hold, committed, stay of commitment, or revocation 0    Patient LOS on BEH unit exceeds 20 days 0  LOS: 2   Patient under guardianship, 55+, otherwise medically complex, or under age 11 0   Suicide ideation without relief of precipitating factors 0   Current plan for suicide 0   Current plan for homicide 0   Imminent risk or actual attempt to seriously harm another without relief of factors precipitating the attempt 0   Severe dysfunction in daily living (ex: complete neglect for self care, extreme disruption in vegetative function, extreme deterioration in social interactions) 1   Recent (last 7 days) or current physical aggression in the ED or on unit 0   Restraints or seclusion episode in past 72 hours 0   Recent (last 7 days) or current verbal aggression, agitation, yelling, etc., while in the ED or unit 0   Active psychosis 1   Need for constant or near constant redirection (from leaving, from others, etc).  0   Intrusive or disruptive behaviors 0   TOTAL 2

## 2023-12-12 NOTE — PLAN OF CARE
RN: Pt alert and disoriented to situation. Pt didn't attend group, sleeps in the bed before diner. Pt presented as flat/blunted affect, mood was calm and depressed. Pt was visible in the lounge watching TV for couple of hours after diner, but was withdrawn to self, no interactions with peers. Pt reports continuing to have auditory hallucination, but contracts for safety on the unit. Pt denied SI/SIB/HI, visual hallucination. Pt endorsed anxiety, PRN Atarax given with good effects. Pt is medication compliant, no reported or observed side effects. Pt denied pain or physical discomfort. Reported no issues with bowel and bladder. Continue with POC.        Goal Outcome Evaluation:    Plan of Care Reviewed With: patient

## 2023-12-12 NOTE — PLAN OF CARE
RN Shift Summary     Patient presented with an anxious affect today. His responses were delayed or nonexistent. He often communicated by pointing at things (e.g. pointing at the location of blood draw when asked how he's doing, pointing at a particular chair in the lounge rather than moving his tray on his own to a preferred spot). He appeared pale and acted strangely after getting a blood draw in the morning. Initial vital signs check resulted in /121 and blood sugar was 89. After talking with writer and sitting for several minutes, patient's BP was 121/85 and he reported feeling better. He acted very helpless today; for example, he asked a staff member to open all the items on his breakfast tray for him. He did other abnormal things throughout the day, such as lifting each item off of his tray and putting them back down repeatedly. He took all his scheduled medications. He asked what the medications were a couple of times, despite writer already describing each medication. Writer asked about patient's bowel habits. He said he usually has a bowel movement every three days, and if it is longer than that, he requires medication to help him. He stated his last bowel movement was yesterday. He denied any GI discomfort and agreed to notify staff if any of the above changed. Per MD, patient reported that he is constipated and bloated. Patient was present in the milieu but kept to himself for most of the day.    Vitals: B/P: 121/85, T: 98.6, P: 93, R: 17

## 2023-12-12 NOTE — PLAN OF CARE
Team Meeting: ~10am in OT or conference room  Attending Provider: Chante Snyder DO   Voicemail Code: See desk phone  Team Note Due: Monday  Next Steps:   Connect with Connor regarding interests for possible alternative discharge location.     Assessment/Intervention/Current Symtoms and Care Coordination:  -Chart review  -Team meeting - Connor has been delayed in response with staff and makes odd statements in response to questions. He was accepting of blood draw though appeared uncomfortable. RN notes there was a slight spike in his blood pressure but it eventually resolved. Blood sugar is within normal range. Provider notes repeat labs are normal. Provider plans to discuss possible adjustments to neuroleptics given Latuda and Seroquel currently being prescribed, with plan to possibly discontinue Latuda. Connor presents with ongoing disorganization and symptoms of psychosis, and provider notes a 72 hour hold would be considered if he requested to leave. Connor endorses constipation and provider plans to schedule Miralax. Connor expresses goal of focusing on his health.   Current Symptoms include the following: disorganization  Precautions: SI     Discharge Plan or Goal:  Pending stabilization & development of a safe discharge plan.  Considerations include: Return home with outpatient providers     Barriers to Discharge:  Connor presents with concern for SI, decompensating schizoaffective disorder with worsening symptoms of psychosis (command AH), medication non-adherence, and poor self-care. He requires symptom stabilization, medication management, and supportive discharge plan.      Referral Status:  Referrals TBD     Legal Status:  Patient is voluntary (as of 12/11/2023)     Contacts:  Family/Friends:  Mom - Chante Antony (phone: [phone])     Outpatient Providers:   - Julito Cantreller (phone: [phone], fax: [phone])  Psychiatrist/Medication Management Provider -  TRINH Ronquillo of [facility] (phone: [phone])  Primary Care Provider - Yony Ro MD of [facility] (phone: [phone])     Upcoming Meetings/Important Dates:  Court (commitment/guardianship,etc.):  N/A     Interview/assessment/care conference:  N/A     Aftercare/outpatient appointments:  N/A     Rationale for SIO/No Roommate Order:  Patient is not on SIO.  Patient has current roommate.

## 2023-12-12 NOTE — PLAN OF CARE
Initial meeting note:    Therapist introduced self to patient and discussed psychotherapy service available to patient.     Pt response: Pt not interested currently in meeting 1:1; therapist will continue remaining available for pt     Plan: Pt was encouraged to attend groups and therapist will remain available for 1:1 sessions

## 2023-12-12 NOTE — PROGRESS NOTES
"[facility]   Psychiatric Progress Note  Hospital Day: 2        Interim History:   The patient's care was discussed with the treatment team during the daily team meeting and/or staff's chart notes were reviewed.  Staff report patient continued to be confused/disorganized/disoriented, did not attend group, spent time in room sleeping, did eat dinner, watched TV with peers for a few hours, reporting ongoing AH, denying SI, taking medications as prescribed, slept 7 hours.     Upon interview, pt was in his room resting, reports he slept well last night but still resting in room (around 1130am) due to \"trying to feel better\". He states his mood is \"doing good actually and better than yesterday\", denies having any SI thoughts \"not now\", unsure of when last thoughts were. Denies HI, reports \"I am not a danger to myself or others at this time\". Denied having AH stating \"I have schizoaffective disorder, that is different than schizophrenia so voices are different\". Denies having VH or paranoia. He is tolerating medications, feels they have been helpful to \"make my thinking clearer\". He reports having some constipation, believes last BM was \"a few days ago\" and feeling bloated. Would like to schedule Miralax along with Senna/Colace, he is still able to eat and has appetite, he will let his RN know if symptoms worsen. He also mentioned having 2 areas of his arms he wanted to be looked at, he pulled arms out from blanket and showed area on both forearms that he said he noticed a \"gray patch with spots\" earlier, when looking at arms with writer stated \"well now its gone\", he agrees to let staff know if it returns so can be examined. He asked if he could get \"a head to toe Xray to make sure everything is okay\", denies having any current pain or discomfort. Reviewed not recommended to expose pt to any unnecessary radiation and he was in agreement. He continues to feel safe and comfortable on " "the unit and continues to \"work on getting better\". Writer reviewed the lab results from this AM that showed improvements in abnormalities that were present with labs in ED. No additional concerns.            Medications:      busPIRone  5 mg Oral BID    lurasidone  20 mg Oral Daily    QUEtiapine  300 mg Oral At Bedtime    senna-docusate  2 tablet Oral BID    cholecalciferol  50 mcg Oral Daily          Allergies:   No Known Allergies       Labs:   No results found for this or any previous visit (from the past 48 hour(s)).       Psychiatric Examination:     BP (!) 128/93 (BP Location: Right arm, Patient Position: Sitting, Cuff Size: Adult Large)   Pulse 89   Temp 98.5  F (36.9  C) (Oral)   Resp 16   Wt 137.2 kg (302 lb 8 oz)   SpO2 97%   BMI 41.03 kg/m    Weight is 302 lbs 8 oz  Body mass index is 41.03 kg/m .    Orthostatic Vitals         Most Recent      Sitting Orthostatic /121 12/12 0832    Sitting Orthostatic Pulse (bpm) 93 12/12 0832    Standing Orthostatic BP --  Comment: standing not done-  pt was moving too much 12/11 0835          Appearance: awake, alert and adequately groomed  Attitude:   mostly cooperative, some guardedness still present   Eye Contact:  fair  Mood:   \"doing good actually and better than yesterday\"  Affect:  mood congruent  Speech:  clear, coherent and normal prosody  Language: fluent and intact in English  Psychomotor, Gait, Musculoskeletal:  no evidence of tardive dyskinesia, dystonia, or tics  Thought Process:  disorganized, though mildly improved today   Associations:  no loose associations  Thought Content:   denies current SI or HI, denies current AH but has stated having some recently to staff, denies VH  Insight:  limited  Judgement:  limited  Oriented to:   person, place, month, year  Attention Span and Concentration:  fair  Recent and Remote Memory:  limited  Fund of Knowledge:  appropriate           Precautions:     Behavioral Orders   Procedures    Code 1 - " Restrict to Unit    Routine Programming     As clinically indicated    Status 15     Every 15 minutes.    Suicide precautions     Patients on Suicide Precautions should have a Combination Diet ordered that includes a Diet selection(s) AND a Behavioral Tray selection for Safe Tray - with utensils, or Safe Tray - NO utensils            Diagnoses:      Schizoaffective disorder, bipolar type (H)  ARETHA  ASD  ADHD per chart  Cannabis use per chart  Alcohol use per chart  Constipation, unspecified constipation type  Hx of Vit D deficiency   Hypokalemia, improved  Leukocytosis, improved          Assessment & Plan:   Assessment and hospital summary:  33 y M with history of schizoaffective disorder, anxiety, autism and substance use who presented to the ED via EMS for SI and psychosis in context of medication non-adherence. Medically cleared in ED, was initially agreeable to remain in ED then requested to leave, placed on 72HH, started on lurasidone to target mood and psychosis and pt requested to try a new medication. Admitted to 10N under 72HH. UDS negative, other admission labs ordered. PTA quetiapine continued to furhter target psychosis. Pt also on buspirone for anxiety and some Miralax that was started in ED for constipation, he also received enema in ED per his request. Has been on senna/colace PTA, this was resumed and miralax moved to PRN. Pt also requested to be on vitamin D, has not had recent level, this was ordered, standard daily dosing ordered at this time and will determine if patient again requires high dose replacement as he has in the past. On 12/11 patient agreeable to signing in as voluntary patient. Also reports needing a new place to discharge to as not feeling safe in previous residence, CTC to explore. Continues to have some disorganization along with paranoia and reports of AH, will continue to adjust medications for stabilization as indicated/tolerated.     Psychiatric  treatment/interventions:  Medications:   -continue PTA quetiapine 300mg at bedtime for psychosis and mood  -continue lurasidone 20mg daily started in ED for mood and psychosis, will work to optimize and move to monotherapy as patient stabilizes, he currently wants to remain on both medications, will continue to address   -continue Vit D3 50mcg daily, started 12/11 per pts request, Vit D level 33  -continue buspirone 5mg BID for anxiety     -PRN hydroxyzine 25mg every 4 hours anxiety  -PRN lorazapam 1mg every 4 hours PO or IM for agitation   -PRN olanzapine 5-10mg PO or 10mg IM TID for agitation, first line  -PRN trazodone 50mg at bedtime for sleep       -pt agreed to sign in voluntary 12/11, discontinued 72HH       The risks, benefits, alternatives and side effects have been discussed and are understood by the patient.     Laboratory/Imaging: Vit D level 33, repeat K+ WNL; repeat WBC WNL; HgbA1c WNL at 5.2     Patient will be treated in therapeutic milieu with appropriate individual and group therapies as described.     Medical treatment/interventions:  Medical concerns: Pt requests daily Vit D, has hx of Vit D deficiency requiring high dose replacement, ordered 50mcg daily and Vit D level as above. Pt also has hx of constipation, was on scheduled Senna/Colace PTA, will resume and move miralax to PRN and continue to monitor. Pt also with hypokalemia in ED and leukoctyosis, will repeat labs in AM and address as indicated. Repeat labs WNL on 12/12, Vit D level WNL will continue daily supplementation as above.       Disposition Plan   Reason for ongoing admission: poses an imminent risk to self and is unable to care for self due to severe psychosis or robert  Discharge location:  TBD, pt reports not feeling safe to return to previous residence, may consider IRTS  Discharge Medications: not ordered  Follow-up Appointments: not scheduled  Legal Status:  Admitted on 72HH,signed in voluntary on 12/11       This document is  created with the help of Dragon dictation system.  All grammatical/typing errors or context distortion are unintentional and inherent to software.    Patient has been seen and evaluated by me, Chante Snyder DO.

## 2023-12-13 PROCEDURE — 250N000013 HC RX MED GY IP 250 OP 250 PS 637: Performed by: EMERGENCY MEDICINE

## 2023-12-13 PROCEDURE — 250N000013 HC RX MED GY IP 250 OP 250 PS 637: Performed by: STUDENT IN AN ORGANIZED HEALTH CARE EDUCATION/TRAINING PROGRAM

## 2023-12-13 PROCEDURE — 250N000013 HC RX MED GY IP 250 OP 250 PS 637: Performed by: PSYCHIATRY & NEUROLOGY

## 2023-12-13 PROCEDURE — 124N000002 HC R&B MH UMMC

## 2023-12-13 PROCEDURE — 250N000013 HC RX MED GY IP 250 OP 250 PS 637: Performed by: FAMILY MEDICINE

## 2023-12-13 RX ADMIN — HYDROXYZINE HYDROCHLORIDE 25 MG: 25 TABLET, FILM COATED ORAL at 18:04

## 2023-12-13 RX ADMIN — BUSPIRONE HYDROCHLORIDE 5 MG: 5 TABLET ORAL at 10:10

## 2023-12-13 RX ADMIN — Medication 50 MCG: at 10:09

## 2023-12-13 RX ADMIN — LURASIDONE HYDROCHLORIDE 20 MG: 20 TABLET, COATED ORAL at 10:09

## 2023-12-13 RX ADMIN — QUETIAPINE 300 MG: 100 TABLET ORAL at 20:40

## 2023-12-13 RX ADMIN — BUSPIRONE HYDROCHLORIDE 5 MG: 5 TABLET ORAL at 20:40

## 2023-12-13 RX ADMIN — SENNOSIDES AND DOCUSATE SODIUM 2 TABLET: 8.6; 5 TABLET ORAL at 10:09

## 2023-12-13 RX ADMIN — SENNOSIDES AND DOCUSATE SODIUM 2 TABLET: 8.6; 5 TABLET ORAL at 20:40

## 2023-12-13 ASSESSMENT — ACTIVITIES OF DAILY LIVING (ADL)
ADLS_ACUITY_SCORE: 29
ADLS_ACUITY_SCORE: 29
HYGIENE/GROOMING: INDEPENDENT
ADLS_ACUITY_SCORE: 29
ORAL_HYGIENE: INDEPENDENT
ADLS_ACUITY_SCORE: 29
DRESS: INDEPENDENT
ADLS_ACUITY_SCORE: 29
LAUNDRY: UNABLE TO COMPLETE
ADLS_ACUITY_SCORE: 29

## 2023-12-13 NOTE — PLAN OF CARE
Problem: Adult Behavioral Health Plan of Care  Goal: Plan of Care Review  Outcome: Progressing     Problem: Sleep Disturbance  Goal: Adequate Sleep/Rest  Outcome: Progressing   Goal Outcome Evaluation:       Pt appears to be sleeping comfortably with even and non labored respirations during all safety checks. Pt slept for 7 hours. No safety or behavioral concerns noted. Will continue with same plan of care.

## 2023-12-13 NOTE — PLAN OF CARE
Patient has been present in the milieu. He worked on puzzles in the dining room.  He is withdrawn and isolative keeps to himself. He had his medications late this morning(1000). At first he stated he was not going to take his medications, he asked this writer what will happen if he stops taking his medications, staff educated him the importance of taking his medications as prescribed then he said 'fine I will take only my constipation medication, can I have my Miralax in a tablet form instead of powder' he was notified that Miralax comes in a powder form, pt ended up not taking Miralax but he took his senna  with other morning medications.  He drank some prune juice and reported having a BM today. No behavioral outburst. He denied all MH symptoms. He ate all his food. He reported good sleep. Staff will continue to monitor.

## 2023-12-13 NOTE — CARE PLAN
Occupational Therapy Group Note:     12/13/23 1904   Group Therapy Session   Group Attendance other (see comments)   Patient Participation Detail Pt briefly wandered into OT groups today, though declined actively participating, and stayed for about 5 minutes of each group (no charge). He was calm and polite in brief interactions. Will continue to assess.

## 2023-12-13 NOTE — PLAN OF CARE
"Team Meeting: ~10am in OT or conference room  Attending Provider: Chante Snyder,    Voicemail Code: See desk phone  Team Note Due: Monday  Next Steps:   Follow up with mental health .   Connect with Connor regarding interests for possible alternative discharge location.     Assessment/Intervention/Current Symtoms and Care Coordination:  -Chart review  -Team meeting - Connor slept for 7 hours. He continues to appear disorganized and delayed with responses. He reported increased agitation to RN last evening and indicated he felt like he was escalated to the point where he may \"strike\" but was able to maintain safety/control.   -Writer met with Connor to provide check-in. He was sitting in the dining area completing a puzzle and showed writer the order in which he plans to complete the rest of the puzzle, noting that he is starting with a 100 piece puzzle with the plan to work up to 500 and 1000 pieces. He states his primary concern is his current housing, noting his apartment is in an unlivable condition and is traumatic to him. Connor would like to return to retrieve some of his belongings, but otherwise would like to let the apartment go. He says he has MHR and because of this, his understanding is he's supposed to remain within [facility]. Connor says for a while he lived in a condo in [place] and enjoyed his time there. He says he did not leave on bad terms and would be open to returning if able. Writer reviewed that hospital team typically has minimal involvement/ability to assist with permanent housing, and our primary focus is related to mental health/[facility] treatment resources. Connor is open to other suggestions for placement as well and provided verbal consent to connect with his  who he says he hasn't spoken to much since November, but has historically been very helpful. Connor spoke more about the nature of his apartment and says it's been vandalized by the other " tenants, and would take a few hundred people to clean it out. He does not feel it is a safe environment to return to. Connor also shared his preferred place to live would be [place]. Writer plans to connect with him again tomorrow after attempt to reach his  - he is in agreement with this plan and expressed no other questions/concerns.   -Writer called Julitoerma voicemail at 3:41pm.   Current Symptoms include the following: disorganization  Precautions: SI     Discharge Plan or Goal:  Pending stabilization & development of a safe discharge plan.  Considerations include: Return home with outpatient providers     Barriers to Discharge:  Connor presents with concern for SI, decompensating schizoaffective disorder with worsening symptoms of psychosis (command AH), medication non-adherence, and poor self-care. He requires symptom stabilization, medication management, and supportive discharge plan.      Referral Status:  Referrals TBD     Legal Status:  Patient is voluntary (as of 12/11/2023)     Contacts:  Family/Friends:  Mom - Chante Antony (phone: [phone])     Outpatient Providers:   - Julito Bashir (phone: [phone], fax: [phone])  Psychiatrist/Medication Management Provider - TRINH Ronquillo of [facility] (phone: [phone])  Primary Care Provider - Yony Ro MD of [facility] (phone: [phone])     Upcoming Meetings/Important Dates:  Court (commitment/guardianship,etc.):  N/A     Interview/assessment/care conference:  N/A     Aftercare/outpatient appointments:  N/A     Rationale for SIO/No Roommate Order:  Patient is not on SIO.  Patient has current roommate.

## 2023-12-13 NOTE — PLAN OF CARE
BEH IP Unit Acuity Rating Score (UARS)  Patient is given one point for every criteria they meet.    CRITERIA SCORING   On a 72 hour hold, court hold, committed, stay of commitment, or revocation 0    Patient LOS on BEH unit exceeds 20 days 0  LOS: 3   Patient under guardianship, 55+, otherwise medically complex, or under age 11 0   Suicide ideation without relief of precipitating factors 0   Current plan for suicide 0   Current plan for homicide 0   Imminent risk or actual attempt to seriously harm another without relief of factors precipitating the attempt 0   Severe dysfunction in daily living (ex: complete neglect for self care, extreme disruption in vegetative function, extreme deterioration in social interactions) 1   Recent (last 7 days) or current physical aggression in the ED or on unit 0   Restraints or seclusion episode in past 72 hours 0   Recent (last 7 days) or current verbal aggression, agitation, yelling, etc., while in the ED or unit 0   Active psychosis 1   Need for constant or near constant redirection (from leaving, from others, etc).  0   Intrusive or disruptive behaviors 0   TOTAL 2

## 2023-12-14 PROCEDURE — 250N000013 HC RX MED GY IP 250 OP 250 PS 637: Performed by: PSYCHIATRY & NEUROLOGY

## 2023-12-14 PROCEDURE — 250N000013 HC RX MED GY IP 250 OP 250 PS 637: Performed by: EMERGENCY MEDICINE

## 2023-12-14 PROCEDURE — 250N000013 HC RX MED GY IP 250 OP 250 PS 637: Performed by: FAMILY MEDICINE

## 2023-12-14 PROCEDURE — 124N000002 HC R&B MH UMMC

## 2023-12-14 PROCEDURE — 99232 SBSQ HOSP IP/OBS MODERATE 35: CPT | Performed by: PSYCHIATRY & NEUROLOGY

## 2023-12-14 RX ORDER — LURASIDONE HYDROCHLORIDE 40 MG/1
40 TABLET, FILM COATED ORAL DAILY
Status: DISCONTINUED | OUTPATIENT
Start: 2023-12-15 | End: 2023-12-15

## 2023-12-14 RX ADMIN — BUSPIRONE HYDROCHLORIDE 5 MG: 5 TABLET ORAL at 19:35

## 2023-12-14 RX ADMIN — SENNOSIDES AND DOCUSATE SODIUM 2 TABLET: 8.6; 5 TABLET ORAL at 09:02

## 2023-12-14 RX ADMIN — BUSPIRONE HYDROCHLORIDE 5 MG: 5 TABLET ORAL at 09:02

## 2023-12-14 RX ADMIN — QUETIAPINE 300 MG: 100 TABLET ORAL at 19:35

## 2023-12-14 RX ADMIN — SENNOSIDES AND DOCUSATE SODIUM 2 TABLET: 8.6; 5 TABLET ORAL at 19:34

## 2023-12-14 RX ADMIN — Medication 50 MCG: at 09:02

## 2023-12-14 RX ADMIN — LURASIDONE HYDROCHLORIDE 20 MG: 20 TABLET, COATED ORAL at 09:02

## 2023-12-14 ASSESSMENT — ACTIVITIES OF DAILY LIVING (ADL)
ADLS_ACUITY_SCORE: 29
ORAL_HYGIENE: INDEPENDENT
LAUNDRY: WITH SUPERVISION
DRESS: INDEPENDENT
ADLS_ACUITY_SCORE: 29
DRESS: INDEPENDENT
ORAL_HYGIENE: INDEPENDENT
ADLS_ACUITY_SCORE: 29
HYGIENE/GROOMING: INDEPENDENT
HYGIENE/GROOMING: INDEPENDENT
ADLS_ACUITY_SCORE: 29
LAUNDRY: WITH SUPERVISION
ADLS_ACUITY_SCORE: 29

## 2023-12-14 NOTE — PLAN OF CARE
Team Meeting: ~10am in OT or conference room  Attending Provider: Chante Snyder, DO   Voicemail Code: See desk phone  Team Note Due: Monday  Next Steps:   Follow up with mental health .   Connect with Connor regarding interests for possible alternative discharge location.     Assessment/Intervention/Current Symtoms and Care Coordination:  -Chart review  -Team meeting - Connor continues to present as confused, disoriented, disorganized, and tense, noted also when RN approached with medications. Connor endorses rash on abdomen, however will not allow RN to assess. He also reports constipation but is declining Miralax/other interventions offered.   -RN notes she attempted to request that Connor sign ROIs for family, etc. And he declined at this time, stating he does not have a grandma. Connor also reportedly is stating he wants to return home versus going to something like an IRTS.   -Writer received voicemail from Julito and writer returned call, however left additional voicemail at 3:28pm.   Current Symptoms include the following: disorganization  Precautions: SI     Discharge Plan or Goal:  Pending stabilization & development of a safe discharge plan.  Considerations include: Return home with outpatient providers     Barriers to Discharge:  Connor presents with concern for SI, decompensating schizoaffective disorder with worsening symptoms of psychosis (command AH), medication non-adherence, and poor self-care. He requires symptom stabilization, medication management, and supportive discharge plan.      Referral Status:  Referrals TBD     Legal Status:  Patient is voluntary (as of 12/11/2023)     Contacts:  Family/Friends:  Mom - Chante Antony (phone: [phone])     Outpatient Providers:   - Julito [facility] [place] (phone: [phone], fax: [phone]) - verbal consent to communicate given on 12/13/2023  Psychiatrist/Medication Management Provider - Karen Michelle  [facility] (phone: [phone])  Primary Care Provider - Yony Ro MD of [facility] (phone: [phone])     Upcoming Meetings/Important Dates:  Court (commitment/guardianship,etc.):  N/A     Interview/assessment/care conference:  N/A     Aftercare/outpatient appointments:  N/A     Rationale for SIO/No Roommate Order:  Patient is not on SIO.  Patient has current roommate.

## 2023-12-14 NOTE — PLAN OF CARE
"Patient was alert, disoriented to his situation, and time. He was calm, but appeared tense. He was withdrawn and isolative, he was occasionally seen in the milieu making a puzzle and he went to bed early. During check in, patient was short with his answers or would respond with head nods. Patient endorsed high anxiety and depression. He declined to go into detail. He denied SI,SIB,HI, and hallucination. He received PRN Hydroxyzine 25 mg which he verbalized it was effective. It appeared to writer as if he was responding to internal stimuli by doing random gestures. Writer passed him a few times in the soto trying to engage with patient, but he ignored writer. Patient went to bed early and had to be woken up for medications. He ignored writer for a few minutes then responded stating, \" sorry for ignoring you.\" Patient took the medications. Patient wouldn't let writer assess his skin, but it was reported that he has a rash on abdomen. He denied bowel and bladder issues. He ate 100% of dinner. Vitally stable and denied pain.  "

## 2023-12-14 NOTE — CARE PLAN
12/14/23 1400   General Information   Date Initially Attended OT 12/14/23 12/14/23 1318   Group Therapy Session   Group Attendance attended group session   Time Session Began 1015   Time Session Ended 1200   Total Time (minutes) 14 (No charge)   Total # Attendees 6   Group Type Occupational Therapy   Group Topic Covered balanced lifestyle;coping skills/lifestyle management;leisure exploration/use of leisure time;relaxation techniques   Group Session Detail OT Clinic Group   Patient Participation Detail Intervention: OT Clinic with 5 peers. Pt participated in a OT Clinic group to facilitate coping skills exploration and creative expression through personally meaningful activities, and to encourage utilization of these healthy coping skills to promote overall health and wellness. Group included clinical observation of social, cognitive and kinesthetic performance skills to inform treatment and safe discharge planning.    Patient Response: patient agreed to join group after receiving invitation from writer as patient was working on a puzzle outside the group room. Was introduced to clinic group structure, purpose and project options available. Patient shared enjoying drawing in the past as a healthy leisure activity and coping skill. Spent some time looking through supplies for about 10 minutes, however ultimately deciding to leave and return to working on puzzle in the [facility] area. Thanked writer for introducing to group. Was encouraged to join future groups.     Mood/Affect: Anxious, Pleasant    Plan: Patient encouraged to maintain attendance for continued ongoing support in working towards occupational therapy goals to support overall treatment/care.

## 2023-12-14 NOTE — PLAN OF CARE
BEH IP Unit Acuity Rating Score (UARS)  Patient is given one point for every criteria they meet.    CRITERIA SCORING   On a 72 hour hold, court hold, committed, stay of commitment, or revocation 0    Patient LOS on BEH unit exceeds 20 days 0  LOS: 4   Patient under guardianship, 55+, otherwise medically complex, or under age 11 0   Suicide ideation without relief of precipitating factors 0   Current plan for suicide 0   Current plan for homicide 0   Imminent risk or actual attempt to seriously harm another without relief of factors precipitating the attempt 0   Severe dysfunction in daily living (ex: complete neglect for self care, extreme disruption in vegetative function, extreme deterioration in social interactions) 1   Recent (last 7 days) or current physical aggression in the ED or on unit 0   Restraints or seclusion episode in past 72 hours 0   Recent (last 7 days) or current verbal aggression, agitation, yelling, etc., while in the ED or unit 0   Active psychosis 1   Need for constant or near constant redirection (from leaving, from others, etc).  0   Intrusive or disruptive behaviors 0   TOTAL 2

## 2023-12-14 NOTE — PLAN OF CARE
"Pt is visible in the dining room working on puzzles. Pt remains withdrawn to self and does not speak to peers or staff unless asked a question. Pt presents with flat/tense affect and poor eye contact. Pt displays disorganized thought process and appears preoccupied with internal stimuli. Writer approached pt to ask about an NIMO for his grandma and pt states \"I don't have any grandmothers, I don't have any family that I know of\". When asked who his support system is pt states \"just myself\". Pt denies SI/SIB/HI/AVH. After denying all mental health symptoms, pt states \"but they still classify me as schizoaffective\". Pt irritable with assessment questions and asks \"is this 20 questions or something?\". Pt asks when he will discharge and writer referred pt to speak with provider or [facility] about this. Writer mentioned that [facility] is looking into IRTS program for him and pt states \"I don't want to go to IRTS, I just want to go home\". Pt isolated to his room resting in bed through the afternoon.    At 1100, pt came to desk and asked for \"snacks to calm me down\". Writer provided snacks to pt and offered PRN medication to help him calm down. Pt declined medication at this time.     During medication administration, pt was paranoid and at first refused all medications. After writer explained what medications were multiple times, pt states \"well I'm not a nurse or a doctor so if you think I should take them, I will\". Writer encouraged pt to take the medication and he did take them without issue. Pt adamantly declined miralax this morning despite stating he is not going to the bathroom well. Writer provided education and offered to put miralax in orange juice but pt still declined.     Appetite and fluid intake fair. VSS. Pt denies acute physical pain. No medication side effects reported or observed this shift. Hygiene is fair. No issues with bladder observed or reported.      "
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
## 2023-12-14 NOTE — PLAN OF CARE
Goal Outcome Evaluation:       Patient was in bed at the beginning of the shift, breathing quietly and unlabored.   Patient woke up at 12:30 am, came to the Nursing station, requesting for a snack. Patient reported to writer that he had gotten enough sleep so he would like to sit quietly in the lounge area. Patient offered PRN but patient declined. Patient was able to go back to his room and go back to sleep.  Pt slept 6 hrs  No concerns reported or noted this shift.  Will continue to Monitor.  PRNs:None

## 2023-12-14 NOTE — PROGRESS NOTES
"[facility], [place]   Psychiatric Progress Note  Hospital Day: 4        Interim History:   The patient's care was discussed with the treatment team during the daily team meeting and/or staff's chart notes were reviewed.  Staff report patient has been visible in the milieu, keeps to self, appeared to being doing some odd gestures, possibly responding to internal stimuli, took AM medications late yesterday, declined at first and then later agreed to take them, spent time working on a puzzle, short in answers, reporting some anxiety and depression, denying SI or AVH, eating well, reporting rash on abdomen but would not let staff assess, slept 6 hours.     Upon interview, patient was sitting out milieu at a table with numerous puzzles laying around him. He reports he plans to work on a puzzle today. He wanted to meet at the table and not go to his room. Reports that he is \"Okay today\" with his mood, denies having any SI \"not at this moment\" or AVH \"not at this moment\". Prior to interview he was going over his medications with the RN as he was hesistant to take them. Reviewed medications with patient including plan to work on monotherapy with either lurasidone or quetiapine. He does not feel liek the quetiapine has been the most helpful medication and would want to work on switching over to lurasidone. Reviewed how this medication needs to be taken with food and attempted to engage patient around discussion on which meal he eats most consistently to help with scheduling of medication. He went into a disorganized tangent about hours of the day and times for people to be off work, eat lunch, get ready for hte day, etc. Redirected back to discussing treatment plan, will increase lurasidone tomorrow and eventually work on tapering off quetiapine. He was in agreement. Reports he still has some constipation, is eating, not sure when last BM is. Will let staff know if he gets acute abdominal pain. " "No additional concerns.          Medications:      busPIRone  5 mg Oral BID    lurasidone  20 mg Oral Daily    polyethylene glycol  17 g Oral Daily    QUEtiapine  300 mg Oral At Bedtime    senna-docusate  2 tablet Oral BID    cholecalciferol  50 mcg Oral Daily          Allergies:   No Known Allergies       Labs:     Recent Results (from the past 48 hour(s))   Potassium    Collection Time: 12/12/23  8:28 AM   Result Value Ref Range    Potassium 4.2 3.4 - 5.3 mmol/L   WBC count    Collection Time: 12/12/23  8:28 AM   Result Value Ref Range    WBC Count 6.9 4.0 - 11.0 10e3/uL   Glucose by meter    Collection Time: 12/12/23  8:40 AM   Result Value Ref Range    GLUCOSE BY METER POCT 89 70 - 99 mg/dL          Psychiatric Examination:     BP (!) 132/91 (Patient Position: Sitting)   Pulse 104   Temp 98.2  F (36.8  C) (Oral)   Resp 16   Wt 137.2 kg (302 lb 8 oz)   SpO2 100%   BMI 41.03 kg/m    Weight is 302 lbs 8 oz  Body mass index is 41.03 kg/m .    Orthostatic Vitals         Most Recent      Sitting Orthostatic /87 12/14 1609    Sitting Orthostatic Pulse (bpm) 111 12/14 1609    Standing Orthostatic /80 12/14 0800    Standing Orthostatic Pulse (bpm) 133 12/14 0800          Appearance: awake, alert and adequately groomed  Attitude:   mostly cooperative, some guardedness still present   Eye Contact:  fair  Mood:   \"okay today\"  Affect:  mood congruent  Speech:  clear, coherent and normal prosody  Language: fluent and intact in English  Psychomotor, Gait, Musculoskeletal:  no evidence of tardive dyskinesia, dystonia, or tics  Thought Process:  disorganized  Associations:  no loose associations  Thought Content:   denies current SI or HI, denies current AH, appears internally preoccupied at times; denies VH  Insight:  limited  Judgement:  limited  Oriented to:   person, place, month, year  Attention Span and Concentration:  fair  Recent and Remote Memory:  limited  Fund of Knowledge:  appropriate           " Precautions:     Behavioral Orders   Procedures    Code 1 - Restrict to Unit    Routine Programming     As clinically indicated    Status 15     Every 15 minutes.    Suicide precautions     Patients on Suicide Precautions should have a Combination Diet ordered that includes a Diet selection(s) AND a Behavioral Tray selection for Safe Tray - with utensils, or Safe Tray - NO utensils            Diagnoses:      Schizoaffective disorder, bipolar type (H)  ARETHA  ASD  ADHD per chart  Cannabis use per chart  Alcohol use per chart  Constipation, unspecified constipation type  Hx of Vit D deficiency   Hypokalemia, improved  Leukocytosis, improved          Assessment & Plan:   Assessment and hospital summary:  33 y M with history of schizoaffective disorder, anxiety, autism and substance use who presented to the ED via EMS for SI and psychosis in context of medication non-adherence. Medically cleared in ED, was initially agreeable to remain in ED then requested to leave, placed on 72HH, started on lurasidone to target mood and psychosis and pt requested to try a new medication. Admitted to 10N under 72HH. UDS negative, other admission labs ordered. PTA quetiapine continued to furhter target psychosis. Pt also on buspirone for anxiety and some Miralax that was started in ED for constipation, he also received enema in ED per his request. Has been on senna/colace PTA, this was resumed and miralax moved to PRN. Pt also requested to be on vitamin D, has not had recent level, this was ordered, standard daily dosing ordered at this time and will determine if patient again requires high dose replacement as he has in the past. On 12/11 patient agreeable to signing in as voluntary patient. Also reports needing a new place to discharge to as not feeling safe in previous residence, CTC to explore. Continues to have some disorganization along with paranoia and reports of AH, will continue to adjust medications for stabilization as  indicated/tolerated.     Psychiatric treatment/interventions:  Medications:   -continue PTA quetiapine 300mg at bedtime for psychosis and mood  -increase lurasidone to 40mg daily starting tomorrow, medication started in ED for mood and psychosis, will work to optimize and move to monotherapy as patient stabilizes  -continue Vit D3 50mcg daily, started 12/11 per pts request, Vit D level 33  -continue buspirone 5mg BID for anxiety     -PRN hydroxyzine 25mg every 4 hours anxiety  -PRN lorazapam 1mg every 4 hours PO or IM for agitation   -PRN olanzapine 5-10mg PO or 10mg IM TID for agitation, first line  -PRN trazodone 50mg at bedtime for sleep       -pt agreed to sign in voluntary 12/11, discontinued 72HH       The risks, benefits, alternatives and side effects have been discussed and are understood by the patient.     Laboratory/Imaging: no new labs ordered     Patient will be treated in therapeutic milieu with appropriate individual and group therapies as described.     Medical treatment/interventions:  Medical concerns: Pt requests daily Vit D, has hx of Vit D deficiency requiring high dose replacement, ordered 50mcg daily and Vit D level as above. Pt also has hx of constipation, was on scheduled Senna/Colace PTA, will resume and move miralax to PRN and continue to monitor. Pt also with hypokalemia in ED and leukoctyosis, will repeat labs in AM and address as indicated. Repeat labs WNL on 12/12, Vit D level WNL will continue daily supplementation as above. Pt in consistent historian re: bowel movements, continuing to monitor closely.       Disposition Plan   Reason for ongoing admission: poses an imminent risk to self and is unable to care for self due to severe psychosis or robert  Discharge location:  TBD, pt reports not feeling safe to return to previous residence, may consider IRTS  Discharge Medications: not ordered  Follow-up Appointments: not scheduled  Legal Status:  Admitted on 72HH,signed in voluntary on  12/11       This document is created with the help of Dragon dictation system.  All grammatical/typing errors or context distortion are unintentional and inherent to software.    Patient has been seen and evaluated by me, Chante Snyder DO.

## 2023-12-15 PROCEDURE — 250N000013 HC RX MED GY IP 250 OP 250 PS 637: Performed by: STUDENT IN AN ORGANIZED HEALTH CARE EDUCATION/TRAINING PROGRAM

## 2023-12-15 PROCEDURE — 250N000013 HC RX MED GY IP 250 OP 250 PS 637: Performed by: EMERGENCY MEDICINE

## 2023-12-15 PROCEDURE — 99232 SBSQ HOSP IP/OBS MODERATE 35: CPT | Performed by: PSYCHIATRY & NEUROLOGY

## 2023-12-15 PROCEDURE — 124N000002 HC R&B MH UMMC

## 2023-12-15 PROCEDURE — 250N000013 HC RX MED GY IP 250 OP 250 PS 637: Performed by: PSYCHIATRY & NEUROLOGY

## 2023-12-15 PROCEDURE — 250N000013 HC RX MED GY IP 250 OP 250 PS 637: Performed by: FAMILY MEDICINE

## 2023-12-15 RX ORDER — LURASIDONE HYDROCHLORIDE 40 MG/1
40 TABLET, FILM COATED ORAL
Status: DISCONTINUED | OUTPATIENT
Start: 2023-12-15 | End: 2023-12-27 | Stop reason: HOSPADM

## 2023-12-15 RX ADMIN — Medication 50 MCG: at 08:41

## 2023-12-15 RX ADMIN — HYDROXYZINE HYDROCHLORIDE 25 MG: 25 TABLET, FILM COATED ORAL at 16:14

## 2023-12-15 RX ADMIN — POLYETHYLENE GLYCOL 3350 17 G: 17 POWDER, FOR SOLUTION ORAL at 08:40

## 2023-12-15 RX ADMIN — QUETIAPINE 300 MG: 100 TABLET ORAL at 19:12

## 2023-12-15 RX ADMIN — SENNOSIDES AND DOCUSATE SODIUM 2 TABLET: 8.6; 5 TABLET ORAL at 19:12

## 2023-12-15 RX ADMIN — BUSPIRONE HYDROCHLORIDE 5 MG: 5 TABLET ORAL at 19:12

## 2023-12-15 RX ADMIN — SENNOSIDES AND DOCUSATE SODIUM 2 TABLET: 8.6; 5 TABLET ORAL at 08:40

## 2023-12-15 RX ADMIN — BUSPIRONE HYDROCHLORIDE 5 MG: 5 TABLET ORAL at 08:40

## 2023-12-15 RX ADMIN — LURASIDONE HYDROCHLORIDE 40 MG: 40 TABLET, COATED ORAL at 12:29

## 2023-12-15 ASSESSMENT — ACTIVITIES OF DAILY LIVING (ADL)
DRESS: INDEPENDENT
ADLS_ACUITY_SCORE: 29
ADLS_ACUITY_SCORE: 29
ORAL_HYGIENE: INDEPENDENT
DRESS: INDEPENDENT
HYGIENE/GROOMING: INDEPENDENT
ADLS_ACUITY_SCORE: 29
HYGIENE/GROOMING: INDEPENDENT
ORAL_HYGIENE: INDEPENDENT
ADLS_ACUITY_SCORE: 29

## 2023-12-15 NOTE — CARE PLAN
"Occupational Therapy Group Note:     12/15/23 1501   Group Therapy Session   Group Attendance attended group session   Time Session Began 1100   Time Session Ended 1200   Total Time (minutes) 25 (no charge)   Total # Attendees 4   Group Type expressive therapy;task skill   Group Topic Covered cognitive activities;coping skills/lifestyle management;emotions/expression;leisure exploration/use of leisure time;relaxation techniques;structured socialization   Group Session Detail OT Clinic Group Continuation   Patient Response/Contribution cooperative with task   Patient Participation Detail Pt actively participated in occupational therapy clinic to facilitate coping skill exploration, creative expression within personally meaningful activities, and clinical observation of social, cognitive, and kinesthetic performance skills. Patient entered group late. Patient reported interest in free-hand drawing. Writer assisted with preparing workstation and gathering supplies. Pt response: Gm to initiate, gather materials, sequence, and adjust to workspace demands as needed. Demonstrated good focus, planning, and problem solving for selected free-hand drawing task. Able to ask for assistance as needed. No social interaction with peers noted; however, patient engaged in casual conversation with writer regarding hobbies and interests. Patient spent time explaining and demonstrating his artistic creation this date. Patient was receptive of positive feedback regarding artwork. Patient exhibited good eye contact and ability to engage in reciprocal conversation with writer. Patient's affect appeared to brighten with social interaction and responded well to humor. Patient made a brief comment regarding the importance of medication and \"staying on top of them\" upon exiting group room. Pleasant and calm demeanor in group.         "

## 2023-12-15 NOTE — PLAN OF CARE
Pt has a blunted affect with anxious tense mood. Pt was guarded during check in. Pt rates anxiety at 7/10 and depression 8/10. Pt rates pain at 0/10. Pt reports no SI/HI/SIB and contracts for safety. Pt denies any hallucinations and not noted responding to any internal stimuli. Pt was medication compliant. No reported or observed medication side effects. Pt was visible on unit for meals but was mostly withdrawn and isolative to his room. Pt stated he wears prescription glasses and was told that they can be brought in anytime. Continue current POC.    Plan of Care Reviewed With: patient Plan of Care Reviewed With: patient    Overall Patient Progress: no changeOverall Patient Progress: no change

## 2023-12-15 NOTE — PLAN OF CARE
/77 (BP Location: Right arm, Patient Position: Sitting, Cuff Size: Adult Large)   Pulse 94   Temp 98.5  F (36.9  C) (Oral)   Resp 16   Wt 137.2 kg (302 lb 8 oz)   SpO2 96%   BMI 41.03 kg/m    Iso:  No active isolations  Diet: Regular Diet Adult  Mental Status:   Alert and oriented x 4.     Problem: Adult Behavioral Health Plan of Care  Goal: Adheres to Safety Considerations for Self and Others  Outcome: Progressing  Flowsheets (Taken 12/15/2023 1638)  Adheres to Safety Considerations for Self and Others: making progress toward outcome   Goal Outcome Evaluation:         RN Assessment:  SI/Self harm:  Denied  Aggression/agitation/HI:  No  AVH:  No  Sleep: No issues  PRN Med: ATARAX  Medication : Compliant  Physical Complaints/Issues: No issues  I & O: eating and drinking well  LBM: 12/14/23 per pt report.  ADLs: independent  Visits: 0  Vitals:  WNL  COVID 19 Assessment:        Patient does not have new respiratory symptoms.  Patient does not have new sore throat.  Patient does not have a fever greater than 99.5.        Milieu Participation:  Behavior: Calm and cooperative.  Pt is alert and cooperative. Denied having pain and discomfort. Pt withdrawn and isolated to his room. Denied SI,HI and AVH . Contracted for safety.Reported anxiety in the scale of 6/10. ATARAX 50 mg was given . Medication was effective and was tolerated well. Abnormal BP and pulse at the beginning of the shift. Vital were rechecked by the writer and they were normal.

## 2023-12-15 NOTE — PLAN OF CARE
Team Meeting: ~10am in OT or conference room  Attending Provider: Chante Snyder, DO   Voicemail Code: See desk phone  Team Note Due: Monday  Next Steps:   Follow up with mental health .   Gather ROIs if Connor is willing to sign.   Connect with Connor regarding interests for possible alternative discharge location.    Assessment/Intervention/Current Symtoms and Care Coordination:  -Chart review  -Team meeting -  provider indicates schedule for [place] will be changed to around lunch time. Provider also reviewed labs and blood sugar information with Connor. He continues to appear disorganized, disoriented, and agitated at times. Team will continue to consider disposition options, however Connor has so far changed his mind a few times on whether he feels safe to return home.   -Writer received voicemail from Julito indicating she needs there to be an active NIMO before talking further.   Current Symptoms include the following: disorganization  Precautions: SI     Discharge Plan or Goal:  Pending stabilization & development of a safe discharge plan.  Considerations include: Return home with outpatient providers versus IRTS     Barriers to Discharge:  Connor presents with concern for SI, decompensating schizoaffective disorder with worsening symptoms of psychosis (command AH), medication non-adherence, and poor self-care. He requires symptom stabilization, medication management, and supportive discharge plan.      Referral Status:  Referrals TBD     Legal Status:  Patient is voluntary (as of 12/11/2023)     Contacts:  Family/Friends:  Mom - Chante Antony (phone: [phone])     Outpatient Providers:   - Julito Booth Onaway (phone: [phone], fax: [phone]) - verbal consent to communicate given on 12/13/2023  Psychiatrist/Medication Management Provider - TRINH Ronquillo of [facility] (phone: [phone])  Primary Care Provider - Yony  MD Jayjay of [facility] (phone: [phone])    Upcoming Meetings/Important Dates:  Court (commitment/guardianship,etc.):  N/A    Interview/assessment/care conference:  N/A     Aftercare/outpatient appointments:  N/A     Rationale for SIO/No Roommate Order:  Patient is not on SIO.  Patient has current roommate.
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
## 2023-12-15 NOTE — PLAN OF CARE
BEH IP Unit Acuity Rating Score (UARS)  Patient is given one point for every criteria they meet.    CRITERIA SCORING   On a 72 hour hold, court hold, committed, stay of commitment, or revocation 0    Patient LOS on BEH unit exceeds 20 days 0  LOS: 5   Patient under guardianship, 55+, otherwise medically complex, or under age 11 0   Suicide ideation without relief of precipitating factors 0   Current plan for suicide 0   Current plan for homicide 0   Imminent risk or actual attempt to seriously harm another without relief of factors precipitating the attempt 0   Severe dysfunction in daily living (ex: complete neglect for self care, extreme disruption in vegetative function, extreme deterioration in social interactions) 1   Recent (last 7 days) or current physical aggression in the ED or on unit 0   Restraints or seclusion episode in past 72 hours 0   Recent (last 7 days) or current verbal aggression, agitation, yelling, etc., while in the ED or unit 0   Active psychosis 1   Need for constant or near constant redirection (from leaving, from others, etc).  0   Intrusive or disruptive behaviors 0   TOTAL 2

## 2023-12-16 PROCEDURE — 250N000013 HC RX MED GY IP 250 OP 250 PS 637: Performed by: PSYCHIATRY & NEUROLOGY

## 2023-12-16 PROCEDURE — 124N000002 HC R&B MH UMMC

## 2023-12-16 PROCEDURE — 250N000013 HC RX MED GY IP 250 OP 250 PS 637: Performed by: STUDENT IN AN ORGANIZED HEALTH CARE EDUCATION/TRAINING PROGRAM

## 2023-12-16 PROCEDURE — 250N000013 HC RX MED GY IP 250 OP 250 PS 637: Performed by: EMERGENCY MEDICINE

## 2023-12-16 PROCEDURE — 250N000013 HC RX MED GY IP 250 OP 250 PS 637: Performed by: FAMILY MEDICINE

## 2023-12-16 RX ADMIN — POLYETHYLENE GLYCOL 3350 17 G: 17 POWDER, FOR SOLUTION ORAL at 08:26

## 2023-12-16 RX ADMIN — SENNOSIDES AND DOCUSATE SODIUM 2 TABLET: 8.6; 5 TABLET ORAL at 08:26

## 2023-12-16 RX ADMIN — QUETIAPINE 300 MG: 100 TABLET ORAL at 19:22

## 2023-12-16 RX ADMIN — BUSPIRONE HYDROCHLORIDE 5 MG: 5 TABLET ORAL at 08:26

## 2023-12-16 RX ADMIN — SENNOSIDES AND DOCUSATE SODIUM 2 TABLET: 8.6; 5 TABLET ORAL at 19:22

## 2023-12-16 RX ADMIN — HYDROXYZINE HYDROCHLORIDE 25 MG: 25 TABLET, FILM COATED ORAL at 19:22

## 2023-12-16 RX ADMIN — Medication 50 MCG: at 08:26

## 2023-12-16 RX ADMIN — BUSPIRONE HYDROCHLORIDE 5 MG: 5 TABLET ORAL at 19:22

## 2023-12-16 RX ADMIN — LURASIDONE HYDROCHLORIDE 40 MG: 40 TABLET, COATED ORAL at 12:53

## 2023-12-16 ASSESSMENT — ACTIVITIES OF DAILY LIVING (ADL)
ADLS_ACUITY_SCORE: 29
ADLS_ACUITY_SCORE: 29
DRESS: INDEPENDENT
DRESS: INDEPENDENT
ADLS_ACUITY_SCORE: 29
HYGIENE/GROOMING: INDEPENDENT
ADLS_ACUITY_SCORE: 29
ORAL_HYGIENE: INDEPENDENT
ORAL_HYGIENE: INDEPENDENT
HYGIENE/GROOMING: INDEPENDENT
ADLS_ACUITY_SCORE: 29
ADLS_ACUITY_SCORE: 29

## 2023-12-16 NOTE — PLAN OF CARE
Pt has a blunted affect with somber tense mood. Pt was guarded during check in. Pt rates anxiety at 6/10 and depression 4/10. Pt reports anxiety and depression have improved some. Pt rates pain at 0/10. Pt reports no SI/HI/SIB and contracts for safety. Pt denies any hallucinations and not noted responding to any internal stimuli. Pt was medication compliant. No reported or observed medication side effects. Pt was visible on unit for meals but was mostly withdrawn and isolative to his room. Pt continues to express some paranoia and disorganization. Continue current POC.     Plan of Care Reviewed With: patient Plan of Care Reviewed With: patient    Overall Patient Progress: no changeOverall Patient Progress: no change

## 2023-12-16 NOTE — PLAN OF CARE
Problem: Sleep Disturbance  Goal: Adequate Sleep/Rest  Outcome: Progressing   Goal Outcome Evaluation:    Patient observed seeping most of the shift, no labored breathing noted, no complain of pain, patient slept for 7 hours

## 2023-12-17 PROCEDURE — 124N000002 HC R&B MH UMMC

## 2023-12-17 PROCEDURE — 250N000013 HC RX MED GY IP 250 OP 250 PS 637: Performed by: PSYCHIATRY & NEUROLOGY

## 2023-12-17 PROCEDURE — 250N000013 HC RX MED GY IP 250 OP 250 PS 637: Performed by: FAMILY MEDICINE

## 2023-12-17 PROCEDURE — 250N000013 HC RX MED GY IP 250 OP 250 PS 637: Performed by: EMERGENCY MEDICINE

## 2023-12-17 RX ADMIN — LURASIDONE HYDROCHLORIDE 40 MG: 40 TABLET, COATED ORAL at 12:41

## 2023-12-17 RX ADMIN — QUETIAPINE 300 MG: 100 TABLET ORAL at 22:04

## 2023-12-17 RX ADMIN — SENNOSIDES AND DOCUSATE SODIUM 2 TABLET: 8.6; 5 TABLET ORAL at 22:04

## 2023-12-17 RX ADMIN — SENNOSIDES AND DOCUSATE SODIUM 2 TABLET: 8.6; 5 TABLET ORAL at 08:57

## 2023-12-17 RX ADMIN — BUSPIRONE HYDROCHLORIDE 5 MG: 5 TABLET ORAL at 22:04

## 2023-12-17 RX ADMIN — POLYETHYLENE GLYCOL 3350 17 G: 17 POWDER, FOR SOLUTION ORAL at 08:57

## 2023-12-17 RX ADMIN — Medication 50 MCG: at 08:57

## 2023-12-17 RX ADMIN — BUSPIRONE HYDROCHLORIDE 5 MG: 5 TABLET ORAL at 08:57

## 2023-12-17 ASSESSMENT — ACTIVITIES OF DAILY LIVING (ADL)
DRESS: INDEPENDENT
ADLS_ACUITY_SCORE: 29
ADLS_ACUITY_SCORE: 29
DRESS: INDEPENDENT
ORAL_HYGIENE: INDEPENDENT
ORAL_HYGIENE: INDEPENDENT
ADLS_ACUITY_SCORE: 29
HYGIENE/GROOMING: INDEPENDENT
ADLS_ACUITY_SCORE: 29
HYGIENE/GROOMING: INDEPENDENT
ADLS_ACUITY_SCORE: 29
LAUNDRY: WITH SUPERVISION
ADLS_ACUITY_SCORE: 29
ADLS_ACUITY_SCORE: 29

## 2023-12-17 NOTE — PLAN OF CARE
"  Problem: Adult Inpatient Plan of Care  Goal: Optimal Comfort and Wellbeing  Outcome: Progressing   Goal Outcome Evaluation:    Plan of Care Reviewed With: patient               RN Assessment:  SI/Self harm:  Denied  Aggression/agitation/HI:  Yes  AVH:  No  Sleep: No issues  PRN Med: ATARAX  Medication : Compliant  Physical Complaints/Issues: No issues  I & O: eating and drinking well  LBM: 12/16/23 per pt report.  ADLs: independent  Visits: 0  Vitals:  WNL  COVID 19 Assessment:          Patient does not have new respiratory symptoms.  Patient does not have new sore throat.  Patient does not have a fever greater than 99.5.         Milieu Participation:  Behavior: Very anxious ,tearful and tense.  Pt is alert and cooperative. Denied having pain and discomfort.  Pt was  very anxious  this afternoon. Started crying and sobbing . Writer stayed with the pt and tried all interventions .Pt refused all interventions are requested a 12 hour intent to leave. \" I am a human being and I need to be treated as one. I am tired of these doctors keeping me here.\" Writer  tried to reassure the pt and asked him to wait until Monday to speak to his provider and the care team but refused.  The 12 hour intent form was given to the pt,education was provided and pt signed the form. Pt refused to rescind the form. On call provider was paged and informed of the situation. A 72 hour hold order was given. Pt education on the 72 hour hold was provided , informed of his rights  and copy given to him  Pt finally agreed with stay and continue with the treatment and care plan. All pt needs were met.    "

## 2023-12-17 NOTE — PLAN OF CARE
"Pt has a flat blunted affect with tense anxious mood. Pt was guarded during check in. Pt would answer with yes no answers and declined to coping skills. Pt endorses anxiety and depression as \"high\". Pt declined to discuss coping skills or what is triggering him.  Pt rates pain at 0/10. Pt reports no SI/HI/SIB and contracts for safety. Pt denies any hallucinations and not noted responding to any internal stimuli. Pt was medication compliant. No reported or observed medication side effects. Pt was visible on unit for meals and short periods. Pt was noted to be irritable with staff at times. Pt was not social with any peers. Pt showered after lunch. Pt refused breakfast this morning and ate little off his lunch tray but did eat snacks. Continue current POC.    Plan of Care Reviewed With: patient Plan of Care Reviewed With: patient    Overall Patient Progress: no changeOverall Patient Progress: no change           "

## 2023-12-17 NOTE — PLAN OF CARE
Problem: Sleep Disturbance  Goal: Adequate Sleep/Rest  Outcome: Progressing   Goal Outcome Evaluation:    Patient night was un-eventful, appeared to be sleeping throughout this shift, no report of pain, no request for PRN med, patient slept for 7 hrs, continue current plan of care.

## 2023-12-18 PROCEDURE — G0177 OPPS/PHP; TRAIN & EDUC SERV: HCPCS

## 2023-12-18 PROCEDURE — 250N000013 HC RX MED GY IP 250 OP 250 PS 637: Performed by: PSYCHIATRY & NEUROLOGY

## 2023-12-18 PROCEDURE — 124N000002 HC R&B MH UMMC

## 2023-12-18 PROCEDURE — 250N000013 HC RX MED GY IP 250 OP 250 PS 637: Performed by: EMERGENCY MEDICINE

## 2023-12-18 PROCEDURE — 250N000013 HC RX MED GY IP 250 OP 250 PS 637: Performed by: FAMILY MEDICINE

## 2023-12-18 PROCEDURE — 99233 SBSQ HOSP IP/OBS HIGH 50: CPT | Performed by: PSYCHIATRY & NEUROLOGY

## 2023-12-18 RX ADMIN — SENNOSIDES AND DOCUSATE SODIUM 2 TABLET: 8.6; 5 TABLET ORAL at 20:52

## 2023-12-18 RX ADMIN — POLYETHYLENE GLYCOL 3350 17 G: 17 POWDER, FOR SOLUTION ORAL at 08:31

## 2023-12-18 RX ADMIN — BUSPIRONE HYDROCHLORIDE 5 MG: 5 TABLET ORAL at 20:52

## 2023-12-18 RX ADMIN — LURASIDONE HYDROCHLORIDE 40 MG: 40 TABLET, COATED ORAL at 12:59

## 2023-12-18 RX ADMIN — Medication 50 MCG: at 08:32

## 2023-12-18 RX ADMIN — QUETIAPINE 300 MG: 100 TABLET ORAL at 20:52

## 2023-12-18 RX ADMIN — BUSPIRONE HYDROCHLORIDE 5 MG: 5 TABLET ORAL at 08:32

## 2023-12-18 RX ADMIN — SENNOSIDES AND DOCUSATE SODIUM 2 TABLET: 8.6; 5 TABLET ORAL at 08:32

## 2023-12-18 ASSESSMENT — ACTIVITIES OF DAILY LIVING (ADL)
ADLS_ACUITY_SCORE: 29
DRESS: INDEPENDENT
ADLS_ACUITY_SCORE: 29
ORAL_HYGIENE: INDEPENDENT
HYGIENE/GROOMING: INDEPENDENT
ADLS_ACUITY_SCORE: 29

## 2023-12-18 NOTE — PROVIDER NOTIFICATION
12/18/23 1412   Behavioral Team Discussion   Participants Chante Snyder, DO; Saul Conner RN; LUANNE Prado   Progress Minimal   Anticipated length of stay 21-24 days   Continued Stay Criteria/Rationale Connor presents with concern for SI, decompensating schizoaffective disorder with worsening symptoms of psychosis (command AH), medication non-adherence, and poor self-care. He requires symptom stabilization, medication management, and supportive discharge plan.   Precautions SI   Plan Gather collateral information, pursue petition for commitment giving wavering adherence to treatment plan. Consider possible adjustments to medications. Work to increase outpatient supports while considering alternative placement options per Connor's request.   Anticipated Discharge Disposition home or self-care;group home;IRTS

## 2023-12-18 NOTE — PROGRESS NOTES
"[facility], [place]   Psychiatric Progress Note  Hospital Day: 8        Interim History:   The patient's care was discussed with the treatment team during the daily team meeting and/or staff's chart notes were reviewed.  Staff report patient has been less visible in the milieu, more irritable with staff, signed 12 hour intent to discharge on Saturday, placed on 72HH, taking medications as prescribed, intake at meal times has been inconsistent, reporting high levels of anxiety and depression, denying SI, slept 6.75 hours last night.     Upon interview, patient was in bed resting, reports that he is \"sad\" today, inquired as to what was making him sad he said \"being here\".  Expressed concerns about him wanting to discharge over the weekend, he agrees that he had no safe place and where he can go, he states \"I have no family or friends so I do not know what I was going to do\".  Reviewed this is why he was placed on a hold due to concerns about his safety, he states that he agrees to stay here through Wednesday as that is when his hold will be up, reviewed that he was given a request for the [facility] to assess safety to see if he needs to stay past his hold and he expressed understanding.  He denies having any SI or HI.  He continues to have voices.  He reports he is not sure about the medications and if they are helpful, as of today he agrees to continue taking them.  He denies having any side effects from them.  He reports his constipation has improved in the last of all movement today.  He denies any other physical health concerns.  No additional concerns.    Writer placed patient on 72-hour hold over the weekend, completed petition for commitment and Mckeon forms today for Julio Stinson given patient's history of impulsivity, hesitation to take medications, concern for nonadherence to medications PTA, disorganization and concern for safety if he were to discharge from the hospital without being " "stabilized.         Medications:      busPIRone  5 mg Oral BID    lurasidone  40 mg Oral Daily with lunch    polyethylene glycol  17 g Oral Daily    QUEtiapine  300 mg Oral At Bedtime    senna-docusate  2 tablet Oral BID    cholecalciferol  50 mcg Oral Daily          Allergies:   No Known Allergies       Labs:     No results found for this or any previous visit (from the past 48 hour(s)).         Psychiatric Examination:     BP (!) 125/92 (BP Location: Right arm, Patient Position: Sitting, Cuff Size: Adult Large)   Pulse 94   Temp 98.4  F (36.9  C) (Oral)   Resp 18   Wt 133.5 kg (294 lb 6.4 oz)   SpO2 98%   BMI 39.93 kg/m    Weight is 294 lbs 6.4 oz  Body mass index is 39.93 kg/m .    Orthostatic Vitals         Most Recent      Sitting Orthostatic /81 12/18 0837    Sitting Orthostatic Pulse (bpm) 109 12/18 0837    Standing Orthostatic BP --  Comment: refused 12/17 1053            Appearance: awake, alert and adequately groomed  Attitude:   more guarded, somewhat cooperative   Eye Contact:  fair  Mood:   \"sad\"  Affect:  mood congruent  Speech:  clear, coherent and normal prosody  Language: fluent and intact in English  Psychomotor, Gait, Musculoskeletal:  no evidence of tardive dyskinesia, dystonia, or tics  Thought Process:  disorganized  Associations:  no loose associations  Thought Content:   denies current SI or HI, reports intermittent AH, appears internally preoccupied at times; denies VH  Insight:  limited  Judgement:  limited  Oriented to:   person, place, month, year  Attention Span and Concentration:  fair  Recent and Remote Memory:  limited  Fund of Knowledge:  appropriate           Precautions:     Behavioral Orders   Procedures    Code 1 - Restrict to Unit    Routine Programming     As clinically indicated    Status 15     Every 15 minutes.    Suicide precautions     Patients on Suicide Precautions should have a Combination Diet ordered that includes a Diet selection(s) AND a Behavioral Tray " selection for Safe Tray - with utensils, or Safe Tray - NO utensils            Diagnoses:      Schizoaffective disorder, bipolar type (H)  ARETHA  ASD  ADHD per chart  Cannabis use per chart  Alcohol use per chart  Constipation, unspecified constipation type  Hx of Vit D deficiency   Hypokalemia, improved  Leukocytosis, improved          Assessment & Plan:   Assessment and hospital summary:  33 y M with history of schizoaffective disorder, anxiety, autism and substance use who presented to the ED via EMS for SI and psychosis in context of medication non-adherence. Medically cleared in ED, was initially agreeable to remain in ED then requested to leave, placed on 72HH, started on lurasidone to target mood and psychosis and pt requested to try a new medication. Admitted to 10N under 72HH. UDS negative, other admission labs ordered. PTA quetiapine continued to furhter target psychosis. Pt also on buspirone for anxiety and some Miralax that was started in ED for constipation, he also received enema in ED per his request. Has been on senna/colace PTA, this was resumed and miralax moved to PRN. Pt also requested to be on vitamin D, has not had recent level, this was ordered, standard daily dosing ordered at this time and will determine if patient again requires high dose replacement as he has in the past. On 12/11 patient agreeable to signing in as voluntary patient. Also reports needing a new place to discharge to as not feeling safe in previous residence, CTC to explore. Continues to have some disorganization along with paranoia and reports of AH, will continue to adjust medications for stabilization as indicated/tolerated. Pt signed 12 hour intent to discharge on 12/16, unable to identify any safe plan, ongoing disorganization, placed on 72HH. Petition for  commitment and Mckeon submitted 12/18.     Psychiatric treatment/interventions:  Medications:   -continue PTA quetiapine 300mg at bedtime for psychosis and  mood  -continue lurasidone at 40mg daily, moved to lunch time due to need to take with food; medication started in ED for mood and psychosis, had planned to optimize though pts intake appears inconsistent, may need to consider alternative if not frequently taking with enough food.   -continue Vit D3 50mcg daily, started 12/11 per pts request, Vit D level 33  -continue buspirone 5mg BID for anxiety     -PRN hydroxyzine 25mg every 4 hours anxiety  -PRN lorazapam 1mg every 4 hours PO or IM for agitation   -PRN olanzapine 5-10mg PO or 10mg IM TID for agitation, first line  -PRN trazodone 50mg at bedtime for sleep       -pt agreed to sign in voluntary 12/11, discontinued 72HH, pt signed intent to discharge on 12/16, placed on 72HH, petition for MH commitment with Mckeon filed 12/18 with Julio Stinson, requested Mckeon meds: Haldol, Olanzapine, Quetiapine, Lurasidone, Paliperidone       The risks, benefits, alternatives and side effects have been discussed and are understood by the patient.     Laboratory/Imaging: no new labs ordered     Patient will be treated in therapeutic milieu with appropriate individual and group therapies as described.     Medical treatment/interventions:  Medical concerns: Pt requests daily Vit D, has hx of Vit D deficiency requiring high dose replacement, ordered 50mcg daily and Vit D level as above. Pt also has hx of constipation, was on scheduled Senna/Colace PTA, will resume and move miralax to PRN and continue to monitor. Pt also with hypokalemia in ED and leukoctyosis, will repeat labs in AM and address as indicated. Repeat labs WNL on 12/12, Vit D level WNL will continue daily supplementation as above. Pt in consistent historian re: bowel movements, continuing to monitor closely. Reported having last BM 12/18.      Disposition Plan   Reason for ongoing admission: poses an imminent risk to self and is unable to care for self due to severe psychosis or robert  Discharge location:  TBD, pt reports  not feeling safe to return to previous residence, may consider IRTS  Discharge Medications: not ordered  Follow-up Appointments: not scheduled  Legal Status:  Admitted on 72HH,signed in voluntary on 12/11, placed on 72HH on 12/16 after requesting to discharge, petition for MH commitment and Mckeon filed 12/18 with Julio Stinson    >50 min total time that was spent in counseling and coordination of care with staff, reviewing medical record, educating patient about treatment options, side effects and benefits and alternative treatments for medications, providing supportive therapy and redirection regarding above symptoms and filling out petition for MH commitment and Mckeon paperwork.      This document is created with the help of Dragon dictation system.  All grammatical/typing errors or context distortion are unintentional and inherent to software.      Patient has been seen and evaluated by Chante alexander DO.

## 2023-12-18 NOTE — PLAN OF CARE
Pt appeared to sleep 6.75 hours. No concerns reported. Safety checks done at least every 15 minutes.

## 2023-12-18 NOTE — PLAN OF CARE
Team Meeting: ~10am in OT or conference room  Attending Provider: Chante Snyder DO (can be paged or messaged on Teams)  Voicemail Code: See desk phone  Team Note Due: Monday  Next Steps:   Follow up with [facility] Pre-Petition Screening.   Request ROIs for individuals such as .     Assessment/Intervention/Current Symtoms and Care Coordination:  -Chart review  -Team meeting - Connor endorses anxiety and depression. He slept for 6.75 hours. Connor is not social with peers and tends to isolate. He is guarded and suspicious in interaction with staff. There is concern for ongoing impulsivity, lability, disorganization, and unsafe discharge plan so team plans to pursue petition for commitment.   -Team initiated petition for MI Commitment and Mckeon in [facility]. Writer submitted paperwork to [email or web address] and called [phone] to verbally report petition, left voicemail at 4:50pm.   Current Symptoms include the following: Psychosis and anxious  Precautions: SI     Discharge Plan or Goal:  Pending stabilization & development of a safe discharge plan.  Considerations include: Return home with outpatient providers versus IRTS     Barriers to Discharge:  Connor presents with concern for SI, decompensating schizoaffective disorder with worsening symptoms of psychosis (command AH), medication non-adherence, and poor self-care. He requires symptom stabilization, medication management, and supportive discharge plan.      Referral Status:  Referrals TBD     Legal Status:  Patient is on a 72 hour hold (initiated on 2023 at 1814, will  on 2023 at 0001)     Petition for MI Commitment and Mckeon initiated in [facility] on 2023.   (Proposed Mckeon Medications: Haldol, Invega, Zyprexa, Seroquel, and Latuda)     Contacts:  Family/Friends:  Mom - Chante Antony (phone: [phone])     Outpatient Providers:   - Julito Richey of [place]  (phone: [phone], fax: [phone]) - verbal consent to communicate given on 12/13/2023  Psychiatrist/Medication Management Provider - TRINH Ronquillo of [facility] (phone: [phone])  Primary Care Provider - Yony Ro MD of [facility] (phone: [phone])     Upcoming Meetings/Important Dates:  Court (commitment/guardianship,etc.):  N/A     Interview/assessment/care conference:  N/A     Aftercare/outpatient appointments:  N/A     Rationale for SIO/No Roommate Order:  Patient is not on SIO.  Patient has current roommate.

## 2023-12-18 NOTE — CARE PLAN
"Occupational Therapy Group Note:     12/18/23 1033   Group Therapy Session   Group Attendance attended group session   Time Session Began 1020   Time Session Ended 1210   Total Time (minutes) 60   Total # Attendees 5-6   Group Type expressive therapy   Group Topic Covered coping skills/lifestyle management   Group Session Detail OT clinic   Patient Response/Contribution cooperative with task   Patient Participation Detail Pt actively participated in occupational therapy clinic to facilitate coping skill exploration, creative expression within personally meaningful activities, and clinical observation of social, cognitive, and kinesthetic performance skills. Pt response: Requested materials for a self-directed creative expression task (drawing) and was subsequently independent to initiate, gather materials, sequence, and adjust to workspace demands as needed. Demonstrated good attention to task. Observed neatly writing various statements repeatedly in cursive, one reading \"to whom it may concern,\" and another one about being \"disappointed,\" followed by 2 statements that were seemingly positive affirmations. Appeared comfortable sitting among peers, though kept to himself for a majority of group. Calm and pleasant in brief interactions. Will continue to assess.        "

## 2023-12-18 NOTE — PLAN OF CARE
BEH IP Unit Acuity Rating Score (UARS)  Patient is given one point for every criteria they meet.    CRITERIA SCORING   On a 72 hour hold, court hold, committed, stay of commitment, or revocation 1    Patient LOS on BEH unit exceeds 20 days 0  LOS: 8   Patient under guardianship, 55+, otherwise medically complex, or under age 11 0   Suicide ideation without relief of precipitating factors 0   Current plan for suicide 0   Current plan for homicide 0   Imminent risk or actual attempt to seriously harm another without relief of factors precipitating the attempt 0   Severe dysfunction in daily living (ex: complete neglect for self care, extreme disruption in vegetative function, extreme deterioration in social interactions) 1   Recent (last 7 days) or current physical aggression in the ED or on unit 0   Restraints or seclusion episode in past 72 hours 0   Recent (last 7 days) or current verbal aggression, agitation, yelling, etc., while in the ED or unit 1   Active psychosis 1   Need for constant or near constant redirection (from leaving, from others, etc).  0   Intrusive or disruptive behaviors 1   TOTAL 5

## 2023-12-18 NOTE — PLAN OF CARE
Goal Outcome Evaluation:    Plan of Care Reviewed With: patient          Patient presents with a blunted, flat affect, he is more guarded and isolated to himself. Patient is alert and oriented, he was cooperative with assessment, he reported some discomfort when laying on one side on his arms but reported feeling better after changing positions and taking a shower. He was out in the lounge watching TV, but not observed socializing with peers. He reported his anxiety and depression to be high 8/10 and stated he will request for PRN if need after taking a shower which he said is also one of his coping mechanism. He reported to have both negative and positive thoughts sometimes, but he feels safe and contracts for safety here in the hospital. He denied hallucinations. He is medication compliant, no medication side effects reported or noted.

## 2023-12-18 NOTE — PLAN OF CARE
Pt has a flat blunted affect with tense anxious mood. Pt was guarded during check in. Pt endorses anxiety and depression. Pt rates pain at 0/10. Pt reports no SI/HI/SIB and contracts for safety. Pt denies any hallucinations and not noted responding to any internal stimuli. Pt was medication compliant with no cheeking noted. Pt ate 100% of breakfast and lunch. No reported or observed medication side effects. Pt was visible on unit for meals and short periods. Pt was mostly withdrawn to self and isolative to room but did attend a partial group. Pt requested to speak with writer at 1400. Pt was crying in his room and reported feeling worthless and hopeless. Discussed feelings and coping skills and pt was receptive. Pt was challenged and encouraged to spend more time in the milieu and attend groups. Continue current POC.     Plan of Care Reviewed With: patient Plan of Care Reviewed With: patient    Overall Patient Progress: no changeOverall Patient Progress: no change

## 2023-12-19 PROCEDURE — 250N000013 HC RX MED GY IP 250 OP 250 PS 637: Performed by: EMERGENCY MEDICINE

## 2023-12-19 PROCEDURE — 250N000013 HC RX MED GY IP 250 OP 250 PS 637: Performed by: FAMILY MEDICINE

## 2023-12-19 PROCEDURE — G0177 OPPS/PHP; TRAIN & EDUC SERV: HCPCS

## 2023-12-19 PROCEDURE — 124N000002 HC R&B MH UMMC

## 2023-12-19 PROCEDURE — 250N000013 HC RX MED GY IP 250 OP 250 PS 637: Performed by: PSYCHIATRY & NEUROLOGY

## 2023-12-19 PROCEDURE — 250N000013 HC RX MED GY IP 250 OP 250 PS 637: Performed by: STUDENT IN AN ORGANIZED HEALTH CARE EDUCATION/TRAINING PROGRAM

## 2023-12-19 RX ADMIN — BUSPIRONE HYDROCHLORIDE 5 MG: 5 TABLET ORAL at 20:13

## 2023-12-19 RX ADMIN — SENNOSIDES AND DOCUSATE SODIUM 2 TABLET: 8.6; 5 TABLET ORAL at 08:38

## 2023-12-19 RX ADMIN — Medication 50 MCG: at 08:38

## 2023-12-19 RX ADMIN — LURASIDONE HYDROCHLORIDE 40 MG: 40 TABLET, COATED ORAL at 12:42

## 2023-12-19 RX ADMIN — SENNOSIDES AND DOCUSATE SODIUM 2 TABLET: 8.6; 5 TABLET ORAL at 20:13

## 2023-12-19 RX ADMIN — BUSPIRONE HYDROCHLORIDE 5 MG: 5 TABLET ORAL at 08:38

## 2023-12-19 RX ADMIN — HYDROXYZINE HYDROCHLORIDE 25 MG: 25 TABLET, FILM COATED ORAL at 20:19

## 2023-12-19 RX ADMIN — POLYETHYLENE GLYCOL 3350 17 G: 17 POWDER, FOR SOLUTION ORAL at 08:38

## 2023-12-19 RX ADMIN — QUETIAPINE 300 MG: 100 TABLET ORAL at 20:13

## 2023-12-19 ASSESSMENT — ACTIVITIES OF DAILY LIVING (ADL)
ADLS_ACUITY_SCORE: 29
ORAL_HYGIENE: INDEPENDENT
HYGIENE/GROOMING: INDEPENDENT
DRESS: INDEPENDENT
HYGIENE/GROOMING: INDEPENDENT
ORAL_HYGIENE: INDEPENDENT
ADLS_ACUITY_SCORE: 29
DRESS: INDEPENDENT
ADLS_ACUITY_SCORE: 29

## 2023-12-19 NOTE — PLAN OF CARE
BEH IP Unit Acuity Rating Score (UARS)  Patient is given one point for every criteria they meet.    CRITERIA SCORING   On a 72 hour hold, court hold, committed, stay of commitment, or revocation 1    Patient LOS on BEH unit exceeds 20 days 0  LOS: 9   Patient under guardianship, 55+, otherwise medically complex, or under age 11 0   Suicide ideation without relief of precipitating factors 0   Current plan for suicide 0   Current plan for homicide 0   Imminent risk or actual attempt to seriously harm another without relief of factors precipitating the attempt 0   Severe dysfunction in daily living (ex: complete neglect for self care, extreme disruption in vegetative function, extreme deterioration in social interactions) 1   Recent (last 7 days) or current physical aggression in the ED or on unit 0   Restraints or seclusion episode in past 72 hours 0   Recent (last 7 days) or current verbal aggression, agitation, yelling, etc., while in the ED or unit 1   Active psychosis 1   Need for constant or near constant redirection (from leaving, from others, etc).  0   Intrusive or disruptive behaviors 1   TOTAL 5

## 2023-12-19 NOTE — PLAN OF CARE
Problem: Sleep Disturbance  Goal: Adequate Sleep/Rest  Outcome: Progressing   Goal Outcome Evaluation:  Assumed care of patient at 1130 pm, in bed sleeping without any s/s distress.No behavioral issues noted.No signs of SI,HI,SIB or psychosis noted.15 minute safety checks maintained as per policy.Slept for 6.5 hours this night.

## 2023-12-19 NOTE — PLAN OF CARE
"Pt has a flat blunted affect with anxious mood. Pt was guarded during check in. Pt was noted washing hands prior to breakfast and sat down at the table with an object folded in a paper towel. When asked what it was pt stated \"it's my bible I keep like this to keep my sunil\". Pt endorses anxiety and depression. Pt ate 100% of breakfast and 75% of lunch. Pt rates pain at 0/10. Pt reports no  SI/HI/SIB and contracts for safety. Pt denies any hallucinations and not noted responding to any internal stimuli. Pt was medication compliant. No reported or observed medication side effects. Pt was visible on unit and attended groups. Pt is withdrawn to himself and not socia lwith peers.. Continue current POC.        Plan of Care Reviewed With: patient Plan of Care Reviewed With: patient    Overall Patient Progress: no changeOverall Patient Progress: no change           "

## 2023-12-19 NOTE — PLAN OF CARE
Team Meeting: ~10am in OT or conference room  Attending Provider: Chante Snyder DO (off 12/19/2023 - cross-coverage by AUDIE Mc, CNP)  Voicemail Code: See desk phone  Team Note Due: Monday  Next Steps:   Follow up with Jacque from [facility] regarding whether they are supporting petition.     Assessment/Intervention/Current Symtoms and Care Coordination:  -Chart review  -Team meeting - RN reports Connor initially refused breakfast, medications, and VS due to forgetting to complete menu until today. He remains withdrawn to self and labile. This morning he had an object wrapped in a paper towel and indicated his was his Bible which he said needed to be kept wrapped to keep his sunil intact. There was period of Connor crying yesterday and processed feeling worthless and hopeless.  -Writer received confirmation from Candida Rollins at [facility] that petition was received and assigned to Jacque Sanders (phone: [phone]).   -Jacque indicates plan to come to the unit this afternoon to screen Connor.  -Writer met with Connor to provide check-in. He was seated in the lounge reading his Bible, but brightened on approach. Connor is agreeable to having visit from pre-petition screener this afternoon and expressed no other questions/concerns at this time.   -Jacque arrived to unit as DRAKE team was being called for another patient (S.L.). Luke declined to leave his room due to noise in the hallway and Jacque plans to call unit later today to complete screening.  Current Symptoms include the following: Psychosis and anxious  Precautions: SI     Discharge Plan or Goal:  Pending stabilization & development of a safe discharge plan.  Considerations include: Return home with outpatient providers versus IRTS     Barriers to Discharge:  Connor presents with concern for SI, decompensating schizoaffective disorder with worsening symptoms of psychosis (command AH), medication non-adherence, and  poor self-care. He requires symptom stabilization, medication management, and supportive discharge plan.      Referral Status:  Referrals TBD     Legal Status:  Patient is on a 72 hour hold (initiated on 2023 at 1814, will  on 2023 at 0001)      Petition for MI Commitment and Mckeon initiated in [facility] on 2023.   (Proposed Mckeon Medications: Haldol, Invega, Zyprexa, Seroquel, and Latuda)     Contacts:  Family/Friends:  Mom - Chatne Antony (phone: [phone])     Outpatient Providers:   - Julito Booht Thorpe (phone: [phone], fax: [phone]) - verbal consent to communicate given on 2023  Psychiatrist/Medication Management Provider - TRINH Ronquillo of [facility] (phone: [phone])  Primary Care Provider - Yony Ro MD of [facility] (phone: [phone])  [facility] Pre-Petition Screener - Jacque Sanders (phone: [phone], email: [email or web address])     Upcoming Meetings/Important Dates:  Court (commitment/guardianship,etc.):  N/A     Interview/assessment/care conference:  N/A     Aftercare/outpatient appointments:  N/A     Rationale for SIO/No Roommate Order:  Patient is not on SIO.  Patient has current roommate.

## 2023-12-19 NOTE — CARE PLAN
Occupational Therapy Group Note:     12/19/23 1213   Group Therapy Session   Group Attendance attended group session   Time Session Began 1030   Time Session Ended 1225   Total Time (minutes) 115   Total # Attendees 4   Group Type expressive therapy   Group Topic Covered coping skills/lifestyle management   Group Session Detail OT clinic   Patient Response/Contribution cooperative with task   Patient Participation Detail Pt actively participated in occupational therapy clinic to facilitate coping skill exploration, creative expression within personally meaningful activities, and clinical observation of social, cognitive, and kinesthetic performance skills. Pt response: Independent to initiate, gather materials, sequence, and adjust to workspace demands as needed. Demonstrated good focus, planning, and attention to detail for selected self-directed, creative expression task. Able to ask for assistance as needed, and intermittently socialized with peers and staff. Enthusiastic in sharing about the meaning behind the details he added to his project. Calm, pleasant, and polite in all interactions. Affect appeared to brighten on approach.

## 2023-12-19 NOTE — PLAN OF CARE
Goal Outcome Evaluation:    Plan of Care Reviewed With: patient      Patient is alert and oriented,he is more guarded and isolated to himself. Patient endorsed anxiety and depression rating both 4/10.He presented with a blunted, flat affect, presents with some disorganization and suspicious. He was visible in the milieu this shift ,he was observed socializing with one peer in the dining. He reported eating and drinking adequately, denied having issues with his bowel and bladder. He denied SI/SIB/HI and AVH, denied having any pain or discomfort. He was compliant with his scheduled medications, no medication side effects reported or noted. VS stable, staff to continue monitoring.

## 2023-12-19 NOTE — PLAN OF CARE
Problem: Adult Inpatient Plan of Care  Goal: Optimal Comfort and Wellbeing  12/19/2023 1657 by Héctor Hou RN  Outcome: Progressing  12/19/2023 1611 by Héctor Hou RN  Outcome: Progressing   Goal Outcome Evaluation:    RN Assessment:  SI/Self harm:  Denied  Aggression/agitation/HI:  Yes  AVH:  No  Sleep: No issues  PRN Med: Medication : Compliant  Physical Complaints/Issues: No issues  I & O: eating and drinking well  LBM: 12/19/23 per pt report.  ADLs: independent  Visits: 0  Vitals:  WNL  COVID 19 Assessment:          Patient does not have new respiratory symptoms.  Patient does not have new sore throat.  Patient does not have a fever greater than 99.5.         Milieu Participation:  Behavior: Calm and cooperative.  Pt is alert and oriented x 4. Calm and cooperative. Denied having pain and discomfort. Stays isolated in the dining room and does not engage with staff or peers unless approached.   Pt was compliant with medications and no side effects were reported. Writer was informed by another staff member that the mother called and was hysterical because she thought the patient was dead. Writer asked the pt is he wanted to talk about the phone call but pt refused.

## 2023-12-20 PROCEDURE — 124N000002 HC R&B MH UMMC

## 2023-12-20 PROCEDURE — 250N000013 HC RX MED GY IP 250 OP 250 PS 637: Performed by: STUDENT IN AN ORGANIZED HEALTH CARE EDUCATION/TRAINING PROGRAM

## 2023-12-20 PROCEDURE — 99232 SBSQ HOSP IP/OBS MODERATE 35: CPT | Performed by: PSYCHIATRY & NEUROLOGY

## 2023-12-20 PROCEDURE — 250N000013 HC RX MED GY IP 250 OP 250 PS 637: Performed by: FAMILY MEDICINE

## 2023-12-20 PROCEDURE — 250N000013 HC RX MED GY IP 250 OP 250 PS 637: Performed by: PSYCHIATRY & NEUROLOGY

## 2023-12-20 PROCEDURE — H2032 ACTIVITY THERAPY, PER 15 MIN: HCPCS

## 2023-12-20 PROCEDURE — 250N000013 HC RX MED GY IP 250 OP 250 PS 637: Performed by: EMERGENCY MEDICINE

## 2023-12-20 RX ADMIN — Medication 50 MCG: at 08:44

## 2023-12-20 RX ADMIN — SENNOSIDES AND DOCUSATE SODIUM 2 TABLET: 8.6; 5 TABLET ORAL at 20:30

## 2023-12-20 RX ADMIN — SENNOSIDES AND DOCUSATE SODIUM 2 TABLET: 8.6; 5 TABLET ORAL at 08:44

## 2023-12-20 RX ADMIN — HYDROXYZINE HYDROCHLORIDE 25 MG: 25 TABLET, FILM COATED ORAL at 17:01

## 2023-12-20 RX ADMIN — LURASIDONE HYDROCHLORIDE 40 MG: 40 TABLET, COATED ORAL at 12:46

## 2023-12-20 RX ADMIN — BUSPIRONE HYDROCHLORIDE 5 MG: 5 TABLET ORAL at 08:44

## 2023-12-20 RX ADMIN — QUETIAPINE 300 MG: 100 TABLET ORAL at 20:30

## 2023-12-20 RX ADMIN — BUSPIRONE HYDROCHLORIDE 5 MG: 5 TABLET ORAL at 20:31

## 2023-12-20 RX ADMIN — POLYETHYLENE GLYCOL 3350 17 G: 17 POWDER, FOR SOLUTION ORAL at 08:44

## 2023-12-20 ASSESSMENT — ACTIVITIES OF DAILY LIVING (ADL)
ADLS_ACUITY_SCORE: 29
ORAL_HYGIENE: INDEPENDENT
ADLS_ACUITY_SCORE: 29
ADLS_ACUITY_SCORE: 29
ORAL_HYGIENE: INDEPENDENT
ADLS_ACUITY_SCORE: 29
HYGIENE/GROOMING: INDEPENDENT
ADLS_ACUITY_SCORE: 29
HYGIENE/GROOMING: INDEPENDENT
DRESS: INDEPENDENT
ADLS_ACUITY_SCORE: 29
DRESS: INDEPENDENT
ORAL_HYGIENE: INDEPENDENT
ADLS_ACUITY_SCORE: 29
HYGIENE/GROOMING: INDEPENDENT

## 2023-12-20 NOTE — PLAN OF CARE
Pt has a flat blunted affect with tense anxious mood. Pt was guarded during check in. Pt endorses anxiety and depression. Pt rates pain at 0/10. Pt reports no SI/HI/SIB and contracts for safety. Pt denies any hallucinations and not noted responding to any internal stimuli. Pt was medication compliant with no cheeking noted. Pt ate 100% of breakfast and lunch. No reported or observed medication side effects. Pt was visible on unit for meals and short periods. Pt was mostly withdrawn to self and isolative to room but did attend a some groups. Pt showered after lunch. Continue current POC.     Plan of Care Reviewed With: patient Plan of Care Reviewed With: patient    Overall Patient Progress: no changeOverall Patient Progress: no change

## 2023-12-20 NOTE — PROGRESS NOTES
12/20/23 1539   Group Therapy Session   Group Attendance attended group session   Time Session Began 1415   Time Session Ended 1500   Total Time (minutes) 15   Total # Attendees 6   Group Type psychoeducation;psychotherapeutic   Group Topic Covered emotions/expression;cognitive therapy techniques;coping skills/lifestyle management   Group Session Detail Group members checked in with their feelings and a success for the day. Pt discussed how the holiday season effects you, how being in the hospital during the holidays is effecting them, and their joys and struggles with the holiday season.   Patient Response/Contribution cooperative with task;did not discuss personal experience;listened actively   Patient Participation Detail Pt shared a feeling and a success for the day. Pt appeared tense and preoccupied. When asked about the holiday season pt was hesitant to discuss and expressed having nobody. Pt listened for most of the group. Pt expressed gratitude for the group upon ending, suggesting the community and connecting with peers was helpful.

## 2023-12-20 NOTE — PLAN OF CARE
BEH IP Unit Acuity Rating Score (UARS)  Patient is given one point for every criteria they meet.    CRITERIA SCORING   On a 72 hour hold, court hold, committed, stay of commitment, or revocation 1    Patient LOS on BEH unit exceeds 20 days 0  LOS: 10   Patient under guardianship, 55+, otherwise medically complex, or under age 11 0   Suicide ideation without relief of precipitating factors 0   Current plan for suicide 0   Current plan for homicide 0   Imminent risk or actual attempt to seriously harm another without relief of factors precipitating the attempt 0   Severe dysfunction in daily living (ex: complete neglect for self care, extreme disruption in vegetative function, extreme deterioration in social interactions) 1   Recent (last 7 days) or current physical aggression in the ED or on unit 0   Restraints or seclusion episode in past 72 hours 0   Recent (last 7 days) or current verbal aggression, agitation, yelling, etc., while in the ED or unit 1   Active psychosis 1   Need for constant or near constant redirection (from leaving, from others, etc).  0   Intrusive or disruptive behaviors 1   TOTAL 5

## 2023-12-20 NOTE — PLAN OF CARE
/81 (BP Location: Right arm)   Pulse 111   Temp 98.4  F (36.9  C) (Oral)   Resp 17   Wt 133.7 kg (294 lb 11.2 oz)   SpO2 99%   BMI 39.97 kg/m    Iso:  No active isolations  Diet: Regular Diet Adult  Mental Status:   Alert and oriented .  Problem: Adult Inpatient Plan of Care  Goal: Optimal Comfort and Wellbeing  Outcome: Progressing   Goal Outcome Evaluation:    Plan of Care Reviewed With: patient        RN Assessment:  SI/Self harm:  Denied  Aggression/agitation/HI:  NoAVH:  No  Sleep: No issues  PRN Med: Medication : ATARAX  Physical Complaints/Issues: No issues  I & O: eating and drinking well  LBM: 12/19/23 per pt report.  ADLs: independent  Visits: 0  Vitals:  WNL  COVID 19 Assessment:          Patient does not have new respiratory symptoms.  Patient does not have new sore throat.  Patient does not have a fever greater than 99.5.         Milieu Participation:  Behavior: Calm and cooperative.Anxious .  Pt is alert and oriented x 4. Pt is currently on a court hold.Calm and cooperative. Reported anxiety in the scale of 5/10. ATARAX administered and was effective.Denied having pain and discomfort.  Pt was compliant with medications and no side effects were reported. Writer will continue to monitor
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
## 2023-12-20 NOTE — PLAN OF CARE
Problem: Sleep Disturbance  Goal: Adequate Sleep/Rest  Outcome: Progressing   Goal Outcome Evaluation:    The Pt slept for 7 hours. The patient's respiratory pattern appeared normal, with no safety concerns. The fifteen-minute safety inspections are in progress without incident, and he received no medication during the night. The Pt is on Suicide precautions with no related incidence.

## 2023-12-20 NOTE — PROGRESS NOTES
12/20/23 1221   Group Therapy Session   Time Session Began 1115   Time Session Ended 1230   Total Time (minutes) 70   Total # Attendees 6   Group Type expressive therapy   Group Topic Covered balanced lifestyle;emotions/expression;relaxation techniques;self-care activities   Group Session Detail Extended Relaxation & Reflection   Patient Response/Contribution cooperative with task   Patient Participation Detail Cooperatively engaged in Music Relaxation group to decrease anxiety and promote self-reflection.  Calm affect, appropriately engaged in session, responding well to the music.       Connor became very reflective and emotive FPC through group, closing his eyes and taking the music in, letting tears flow.  Was able to be guided and have input into a series of songs that created and held a safe and successful emotional release for him.  Thoughtful and meaningful participation.  Also interacted positively with peers, showing mutual support and encouragement.

## 2023-12-21 PROCEDURE — 250N000013 HC RX MED GY IP 250 OP 250 PS 637: Performed by: PSYCHIATRY & NEUROLOGY

## 2023-12-21 PROCEDURE — 124N000002 HC R&B MH UMMC

## 2023-12-21 PROCEDURE — 250N000013 HC RX MED GY IP 250 OP 250 PS 637: Performed by: FAMILY MEDICINE

## 2023-12-21 PROCEDURE — 250N000013 HC RX MED GY IP 250 OP 250 PS 637: Performed by: EMERGENCY MEDICINE

## 2023-12-21 PROCEDURE — 250N000013 HC RX MED GY IP 250 OP 250 PS 637: Performed by: STUDENT IN AN ORGANIZED HEALTH CARE EDUCATION/TRAINING PROGRAM

## 2023-12-21 PROCEDURE — 99232 SBSQ HOSP IP/OBS MODERATE 35: CPT | Performed by: PSYCHIATRY & NEUROLOGY

## 2023-12-21 PROCEDURE — H2032 ACTIVITY THERAPY, PER 15 MIN: HCPCS

## 2023-12-21 RX ADMIN — LURASIDONE HYDROCHLORIDE 40 MG: 40 TABLET, COATED ORAL at 13:00

## 2023-12-21 RX ADMIN — HYDROXYZINE HYDROCHLORIDE 25 MG: 25 TABLET, FILM COATED ORAL at 08:33

## 2023-12-21 RX ADMIN — Medication 50 MCG: at 09:02

## 2023-12-21 RX ADMIN — SENNOSIDES AND DOCUSATE SODIUM 2 TABLET: 8.6; 5 TABLET ORAL at 09:02

## 2023-12-21 RX ADMIN — HYDROXYZINE HYDROCHLORIDE 25 MG: 25 TABLET, FILM COATED ORAL at 20:18

## 2023-12-21 RX ADMIN — POLYETHYLENE GLYCOL 3350 17 G: 17 POWDER, FOR SOLUTION ORAL at 09:02

## 2023-12-21 RX ADMIN — BUSPIRONE HYDROCHLORIDE 5 MG: 5 TABLET ORAL at 09:02

## 2023-12-21 RX ADMIN — QUETIAPINE 300 MG: 100 TABLET ORAL at 20:18

## 2023-12-21 RX ADMIN — BUSPIRONE HYDROCHLORIDE 5 MG: 5 TABLET ORAL at 20:18

## 2023-12-21 RX ADMIN — SENNOSIDES AND DOCUSATE SODIUM 2 TABLET: 8.6; 5 TABLET ORAL at 20:17

## 2023-12-21 ASSESSMENT — ACTIVITIES OF DAILY LIVING (ADL)
ADLS_ACUITY_SCORE: 29
ORAL_HYGIENE: INDEPENDENT
ADLS_ACUITY_SCORE: 29
LAUNDRY: WITH SUPERVISION
ADLS_ACUITY_SCORE: 29
HYGIENE/GROOMING: INDEPENDENT
DRESS: INDEPENDENT
ADLS_ACUITY_SCORE: 29

## 2023-12-21 NOTE — CARE PLAN
"   12/20/23 1900   Group Therapy Session   Group Attendance attended group session   Time Session Began 1600   Time Session Ended 1645   Total Time (minutes) 45   Total # Attendees 5   Group Type expressive therapy   Group Topic Covered emotions/expression   Patient Response/Contribution cooperative with task     Art Therapy directive is to create art in response to one of several watercolor painting prompts with the themes of personal growth and resiliency.  Goals of directive: emotional expression, emotional regulation, mindfulness, identifying personal strengths and goals, media exploration.  Pt was an engaged participant, focused on task for the full duration of group. Pt finished painting and briefly verbally processed with group.  Pt created a painting in response to \"growth.\" Pt painted a tree with rays of sunlight shining through the leaves and branches. Pt talked about the tree needing sunlight to grow as a self-metaphor for personal growth.  Pts mood was calm, pleasant participant.  "

## 2023-12-21 NOTE — PLAN OF CARE
Pt presents with flat affect but brightens upon approach. Pt is pleasant and cooperative in interactions with staff. Pt is present in milieu but not social with peers. Pt reported anxiety right when he woke up this morning and requested hydroxyzine before breakfast which was given with relief. Pt denies any SI/SIB/HI/AVH.  Pt noted to fall asleep in dining room at times today and then took a nap in his room later in the afternoon. No paranoid comments made to this writer today.     Pt is medication compliant and able to ask for his medications at correct times. Appetite and fluid intake adequate. VS show slightly elevated BP but pt denies any sx of hypertension. Pt denies acute physical pain. No medication side effects reported or observed this shift. Hygiene is adequate. No issues with bowel or bladder observed or reported.    PRN medications utilized this shift include:  Hydroxyzine 25 mg @ 0830- effective

## 2023-12-21 NOTE — PLAN OF CARE
BEH IP Unit Acuity Rating Score (UARS)  Patient is given one point for every criteria they meet.    CRITERIA SCORING   On a 72 hour hold, court hold, committed, stay of commitment, or revocation 1    Patient LOS on BEH unit exceeds 20 days 0  LOS: 11   Patient under guardianship, 55+, otherwise medically complex, or under age 11 0   Suicide ideation without relief of precipitating factors 0   Current plan for suicide 0   Current plan for homicide 0   Imminent risk or actual attempt to seriously harm another without relief of factors precipitating the attempt 0   Severe dysfunction in daily living (ex: complete neglect for self care, extreme disruption in vegetative function, extreme deterioration in social interactions) 1   Recent (last 7 days) or current physical aggression in the ED or on unit 0   Restraints or seclusion episode in past 72 hours 0   Recent (last 7 days) or current verbal aggression, agitation, yelling, etc., while in the ED or unit 1   Active psychosis 1   Need for constant or near constant redirection (from leaving, from others, etc).  0   Intrusive or disruptive behaviors 0   TOTAL 4

## 2023-12-21 NOTE — PROVIDER NOTIFICATION
12/21/23 1124   Behavioral Team Discussion   Participants Chante Snyder, DO; Christal Samuel, RN; LUANNE Prado   Progress Some improvement   Anticipated length of stay 21-24 days   Continued Stay Criteria/Rationale Connor presents with concern for SI, decompensating schizoaffective disorder with worsening symptoms of psychosis (command AH), medication non-adherence, and poor self-care. He requires symptom stabilization, medication management, and supportive discharge plan.   Precautions SI   Plan Gather collateral information, follow up on status of petition for commitment giving wavering adherence to treatment plan. Consider possible adjustments to medications. Work to increase outpatient supports while considering alternative placement options per Connor's request.   Anticipated Discharge Disposition IRTS

## 2023-12-21 NOTE — PROGRESS NOTES
"[facility], [place]   Psychiatric Progress Note  Hospital Day: 11        Interim History:   The patient's care was discussed with the treatment team during the daily team meeting and/or staff's chart notes were reviewed.  Staff report patient has been visible in the milieu, taking medications as prescribed, attending groups, guarded at times, slept 6.75 hours.     Upon interview, patient was sitting alone in the milieu, he agreed to meet in his room, reports that he is feeling \"depressed\" and \"sad\" today, he started to become tearful and stated that his suicidal ideation has returned, reports that the thoughts are just passive, he also continues to have negative voices.  Denies that his voices are command in nature but do continue to comment that he would be better off if he were not alive.  He denies having any thoughts of harming others.  Denies any visual hallucinations.  Process with patient that and stabilization often is not linear and that he will have some days when his symptoms are increased and that the goals that overall trajectory is towards improvement.  He reports he is tolerating medications.  He received his court documents regarding the court hold, he reports that he became upset when he saw a name on the documents that stated it was his mother, he states that this is not his mother unless this person is not willing to provide a paternity test that he will not consider this person part of his family or anyone else that is associated with this person.  He stressed that he does not want anyone else except for his  in the community involved with his care.  Reports that his goals for today are to \"stay safe and take medications\".  Writer discussed possible coping skills he could use when he is feeling more down or having more voices, he is agreeable to try and attend group that he just started.  He walked with writer to the group room and joined the " "activities.         Medications:      busPIRone  5 mg Oral BID    lurasidone  40 mg Oral Daily with lunch    polyethylene glycol  17 g Oral Daily    QUEtiapine  300 mg Oral At Bedtime    senna-docusate  2 tablet Oral BID    cholecalciferol  50 mcg Oral Daily          Allergies:   No Known Allergies       Labs:     No results found for this or any previous visit (from the past 48 hour(s)).         Psychiatric Examination:     /81 (BP Location: Right arm)   Pulse 111   Temp 98.4  F (36.9  C) (Oral)   Resp 17   Wt 133.7 kg (294 lb 11.2 oz)   SpO2 99%   BMI 39.97 kg/m    Weight is 294 lbs 11.2 oz  Body mass index is 39.97 kg/m .    Orthostatic Vitals         Most Recent      Sitting Orthostatic /95 12/21 0850    Sitting Orthostatic Pulse (bpm) 105 12/21 0850    Standing Orthostatic /88 12/21 0850    Standing Orthostatic Pulse (bpm) 128 12/21 0850          Appearance: awake, alert and  untidy, wearing multiple layers of shirts  Attitude:   continues to be a bit guarded, somewhat cooperative   Eye Contact:  fair  Mood:   \"depressed\"  \"sad\"  Affect:  mood congruent, tearful  Speech:  clear, coherent and normal prosody  Language: fluent and intact in English  Psychomotor, Gait, Musculoskeletal:  no evidence of tardive dyskinesia, dystonia, or tics  Thought Process:  disorganized, slowly improving   Associations:  no loose associations  Thought Content:   denies current SI or HI, reports AH, that are negative appears internally preoccupied at times; paranoid; denies VH  Insight:  limited  Judgement:  limited  Oriented to:   person, place, month, year  Attention Span and Concentration:  fair  Recent and Remote Memory:  limited  Fund of Knowledge:  appropriate           Precautions:     Behavioral Orders   Procedures    Code 1 - Restrict to Unit    Routine Programming     As clinically indicated    Status 15     Every 15 minutes.    Suicide precautions     Patients on Suicide Precautions should have a " Combination Diet ordered that includes a Diet selection(s) AND a Behavioral Tray selection for Safe Tray - with utensils, or Safe Tray - NO utensils            Diagnoses:      Schizoaffective disorder, bipolar type (H)  ARETHA  ASD  ADHD per chart  Cannabis use per chart  Alcohol use per chart  Constipation, unspecified constipation type  Hx of Vit D deficiency   Hypokalemia, improved  Leukocytosis, improved          Assessment & Plan:   Assessment and hospital summary:  33 y M with history of schizoaffective disorder, anxiety, autism and substance use who presented to the ED via EMS for SI and psychosis in context of medication non-adherence. Medically cleared in ED, was initially agreeable to remain in ED then requested to leave, placed on 72HH, started on lurasidone to target mood and psychosis and pt requested to try a new medication. Admitted to 10N under 72HH. UDS negative, other admission labs ordered. PTA quetiapine continued to furhter target psychosis. Pt also on buspirone for anxiety and some Miralax that was started in ED for constipation, he also received enema in ED per his request. Has been on senna/colace PTA, this was resumed and miralax moved to PRN. Pt also requested to be on vitamin D, has not had recent level, this was ordered, standard daily dosing ordered at this time and will determine if patient again requires high dose replacement as he has in the past. On 12/11 patient agreeable to signing in as voluntary patient. Also reports needing a new place to discharge to as not feeling safe in previous residence, CTC to explore. Continues to have some disorganization along with paranoia and reports of AH, will continue to adjust medications for stabilization as indicated/tolerated. Pt signed 12 hour intent to discharge on 12/16, unable to identify any safe plan, ongoing disorganization, placed on 72HH. Petition for  commitment and Mike submitted 12/18.     Psychiatric  treatment/interventions:  Medications:   -continue PTA quetiapine 300mg at bedtime for psychosis and mood  -continue lurasidone at 40mg daily, moved to lunch time due to need to take with food; medication started in ED for mood and psychosis, had planned to optimize though pts intake appears inconsistent, may need to consider alternative if not frequently taking with enough food.   -continue Vit D3 50mcg daily, started 12/11 per pts request, Vit D level 33  -continue buspirone 5mg BID for anxiety     -PRN hydroxyzine 25mg every 4 hours anxiety  -PRN lorazapam 1mg every 4 hours PO or IM for agitation   -PRN olanzapine 5-10mg PO or 10mg IM TID for agitation, first line  -PRN trazodone 50mg at bedtime for sleep       -pt agreed to sign in voluntary 12/11, discontinued 72HH, pt signed intent to discharge on 12/16, placed on 72HH, petition for MH commitment with [facility] filed 12/18 with Julio Stinson, requested [facility] meds: Haldol, Olanzapine, Quetiapine, Lurasidone, Paliperidone; pt placed on court hold 12/20      The risks, benefits, alternatives and side effects have been discussed and are understood by the patient.     Laboratory/Imaging: no new labs ordered     Patient will be treated in therapeutic milieu with appropriate individual and group therapies as described.     Medical treatment/interventions:  Medical concerns: Pt requests daily Vit D, has hx of Vit D deficiency requiring high dose replacement, ordered 50mcg daily and Vit D level as above. Pt also has hx of constipation, was on scheduled Senna/Colace PTA, will resume and move miralax to PRN and continue to monitor. Pt also with hypokalemia in ED and leukoctyosis, will repeat labs in AM and address as indicated. Repeat labs WNL on 12/12, Vit D level WNL will continue daily supplementation as above. Pt in consistent historian re: bowel movements, continuing to monitor closely. Reported having last BM 12/20.      Disposition Plan   Reason for ongoing admission:  poses an imminent risk to self and is unable to care for self due to severe psychosis or robert  Discharge location:  TBD, pt reports not feeling safe to return to previous residence, may consider IRTS  Discharge Medications: not ordered  Follow-up Appointments: not scheduled  Legal Status:  Admitted on 72HH,signed in voluntary on 12/11, placed on 72HH on 12/16 after requesting to discharge, petition for MH commitment and [facility] filed 12/18 with Julio Stinson ; pt currently on court hold     This document is created with the help of Dragon dictation system.  All grammatical/typing errors or context distortion are unintentional and inherent to software.      Patient has been seen and evaluated by me, Chante Snyder DO.

## 2023-12-21 NOTE — PLAN OF CARE
Team Meeting: ~10am in OT or conference room  Attending Provider: Chante Snyder DO (can be paged or messaged on Teams)  Voicemail Code: See desk phone  Team Note Due: Monday  Next Steps:   Follow up on IRTS referrals.  Connect with .     Assessment/Intervention/Current Symtoms and Care Coordination:  -Chart review  -Team meeting - Connor has been pleasant upon approach. He received Hydroxyzine for anxiety this morning. He continues to make paranoid statements, specifically about his family being imposters/not his real family. He is wearing many shirts and attending groups. Team has not observed him carrying his Bible.   -Team received additional court hold paperwork from [facility]. Writer added copy to paper chart. Writer also served copy to Connor. He was seated in the lounge with his eyes closed, seated in front of the tv. He presented with sad affect and was accepting of paperwork, but wanted to know what implications were of it. Writer reviewed there will be a hearing on Tuesday 12/26 and that he can expect for an examiner to meet with him to discuss presentation, progress, and plan. He remains motivated to pursue IRTS and appeared reassured that team will continue to support him in that transition. Connor looked through some of the paperwork briefly and found the copy of the petition which lists his mom as a collateral source. He says she needs to be removed immediately as he does not consent to her receiving communication as she is reportedly not his real family member. Connor attempted to give paperwork back to writer - writer indicated it is his copy of the paperwork. Writer offered to place paperwork in his locker if he didn't want to hold onto it and he was agreeable to this. Writer shared they'll continue to keep him updated regarding progress with IRTS referrals. He expressed no other questions/concerns.   -Provider notes Connor is having increased SI thoughts and  "experiencing negative AH. He wants his \"mom\" to take a paternity test to prove whether she's truly his mom.   -Writer called Julito left voicemail at 11:16am.   -Team received Waiver of Appearance and Agreement to Order from Connor's  via fax. Writer reviewed document with Connor and he agreed to sign following conversation with his  yesterday, and with the understanding that he will stay in the hospital until IRTS placement is found. Writer also provided update that voicemail was left for Julito. Connor said he's \"getting there\" with regard to how he's doing later in the morning and appeared to have brighter affect compared to earlier interaction this morning.   -Writer faxed completed waiver/agreement to Connor's  Donny Ingram (fax: [phone]).   Current Symptoms include the following: Psychosis, paranoia, and tearful  Precautions: SI     Discharge Plan or Goal:  Pending stabilization & development of a safe discharge plan.  Considerations include: IRTS versus return home with outpatient providers      Barriers to Discharge:  Connor presents with concern for SI, decompensating schizoaffective disorder with worsening symptoms of psychosis (command AH), medication non-adherence, and poor self-care. He requires symptom stabilization, medication management, and supportive discharge plan.      Referral Status:  IRTS Referrals (initiated by writer via fax on 12/20/2023) general IRTS NIMO signed 12/20/2023:  [facility] ([facility] only)  Update as of 12/21/2023: Estela confirmed referral has been received.  [facility] ([facility] only)     Legal Status:  Patient is on a court hold in [facility] (as of 12/20/2023).   Case No. [id number]     Petition for MI Commitment and [facility] initiated in [facility] on 12/18/2023.   (Proposed [facility] Medications: Haldol, Invega, Zyprexa, Seroquel, and Latuda)     Contacts:  Family/Friends:  Mom - Chante Antony (phone: " [phone])     Outpatient Providers:   - Julito Booth [place] (phone: [phone], fax: [phone]) - written NIMO obtained 12/20/2023  Psychiatrist/Medication Management Provider - TRINH Ronquillo of [facility] (phone: [phone])  Primary Care Provider - Yony Ro MD of [facility] (phone: [phone])  [facility] Pre-Petition Screener - Jacque Sanders (phone: [phone], email: [email or web address])     Upcoming Meetings/Important Dates:  Court (commitment/guardianship,etc.):  Tuesday, December 26 at 9:30am via Zoom - Preliminary Hearing     Interview/assessment/care conference:  N/A     Aftercare/outpatient appointments:  N/A     Rationale for SIO/No Roommate Order:  Patient is not on SIO.  Patient has current roommate.

## 2023-12-21 NOTE — CARE PLAN
"   12/21/23 1422   Group Therapy Session   Group Attendance attended group session   Time Session Began 1015   Time Session Ended 1145   Total Time (minutes) 22 (No Charge)   Total # Attendees 6   Group Type Occupational Therapy   Group Topic Covered balanced lifestyle;coping skills/lifestyle management;leisure exploration/use of leisure time;relaxation techniques   Group Session Detail OT Clinic Group   Patient Participation Detail Intervention: OT Clinic with 5 peers. Pt participated in a OT Clinic group to facilitate coping skills exploration and creative expression through personally meaningful activities, and to encourage utilization of these healthy coping skills to promote overall health and wellness. Group included clinical observation of social, cognitive and kinesthetic performance skills to inform treatment and safe discharge planning.    Patient Response: Joined clinic group requesting to sketch. As writer was walking by patient a little later, patient expressed feeling very \"distraught\" about being here in the hospital. Expresses how much he really does not want to be here, but knows how depressed he is that he should be here. Initiated some brief conversation with writer, asking how writer was. Overall appeared pretty down and was self deprecating in nature about project worked on during group. Observed out in the lounge playing [employer] later in the morning.     Mood/Affect: Sad, frustrated, pleasant overall       Plan: Patient encouraged to maintain attendance for continued ongoing support in working towards occupational therapy goals to support overall treatment/care.          "

## 2023-12-21 NOTE — PLAN OF CARE
Problem: Sleep Disturbance  Goal: Adequate Sleep/Rest  Outcome: Progressing   Goal Outcome Evaluation:           NOC Shift Report   (12/20/23 into 12/21/23)    Pt in bed at beginning of shift, breathing quiet and unlabored. Pt slept through shift. Pt slept 6.75 hours.     No pt complaints or concerns at this time.     No PRNs requested or given.     Will continue to monitor via Q15 minute safety checks.     Will continue to monitor and assess.

## 2023-12-22 PROCEDURE — 99232 SBSQ HOSP IP/OBS MODERATE 35: CPT | Performed by: PSYCHIATRY & NEUROLOGY

## 2023-12-22 PROCEDURE — 250N000013 HC RX MED GY IP 250 OP 250 PS 637: Performed by: STUDENT IN AN ORGANIZED HEALTH CARE EDUCATION/TRAINING PROGRAM

## 2023-12-22 PROCEDURE — 250N000013 HC RX MED GY IP 250 OP 250 PS 637: Performed by: PSYCHIATRY & NEUROLOGY

## 2023-12-22 PROCEDURE — 124N000002 HC R&B MH UMMC

## 2023-12-22 PROCEDURE — 250N000013 HC RX MED GY IP 250 OP 250 PS 637: Performed by: EMERGENCY MEDICINE

## 2023-12-22 PROCEDURE — 250N000013 HC RX MED GY IP 250 OP 250 PS 637: Performed by: FAMILY MEDICINE

## 2023-12-22 PROCEDURE — G0177 OPPS/PHP; TRAIN & EDUC SERV: HCPCS

## 2023-12-22 RX ADMIN — SENNOSIDES AND DOCUSATE SODIUM 2 TABLET: 8.6; 5 TABLET ORAL at 20:18

## 2023-12-22 RX ADMIN — Medication 50 MCG: at 08:23

## 2023-12-22 RX ADMIN — BUSPIRONE HYDROCHLORIDE 5 MG: 5 TABLET ORAL at 08:23

## 2023-12-22 RX ADMIN — POLYETHYLENE GLYCOL 3350 17 G: 17 POWDER, FOR SOLUTION ORAL at 08:23

## 2023-12-22 RX ADMIN — QUETIAPINE 300 MG: 100 TABLET ORAL at 20:17

## 2023-12-22 RX ADMIN — HYDROXYZINE HYDROCHLORIDE 25 MG: 25 TABLET, FILM COATED ORAL at 21:33

## 2023-12-22 RX ADMIN — BUSPIRONE HYDROCHLORIDE 5 MG: 5 TABLET ORAL at 20:17

## 2023-12-22 RX ADMIN — HYDROXYZINE HYDROCHLORIDE 25 MG: 25 TABLET, FILM COATED ORAL at 08:24

## 2023-12-22 RX ADMIN — LURASIDONE HYDROCHLORIDE 40 MG: 40 TABLET, COATED ORAL at 13:15

## 2023-12-22 RX ADMIN — SENNOSIDES AND DOCUSATE SODIUM 2 TABLET: 8.6; 5 TABLET ORAL at 08:23

## 2023-12-22 ASSESSMENT — ACTIVITIES OF DAILY LIVING (ADL)
DRESS: INDEPENDENT
ADLS_ACUITY_SCORE: 29
HYGIENE/GROOMING: INDEPENDENT
ORAL_HYGIENE: INDEPENDENT
ADLS_ACUITY_SCORE: 29
ADLS_ACUITY_SCORE: 29
LAUNDRY: WITH SUPERVISION
DRESS: INDEPENDENT
ADLS_ACUITY_SCORE: 29
ADLS_ACUITY_SCORE: 29
ORAL_HYGIENE: INDEPENDENT
ADLS_ACUITY_SCORE: 29
HYGIENE/GROOMING: INDEPENDENT
ADLS_ACUITY_SCORE: 29
LAUNDRY: WITH SUPERVISION

## 2023-12-22 NOTE — CARE PLAN
12/22/23 1300   Group Therapy Session   Group Attendance attended group session   Time Session Began 1115   Time Session Ended 1200   Total Time (minutes) 45   Total # Attendees 4-5   Group Type task skill;expressive therapy   Group Topic Covered coping skills/lifestyle management;problem-solving   Group Session Detail clinic   Patient Response/Contribution cooperative with task   Patient Participation Detail See Note     Pt actively participated in occupational therapy clinic to facilitate coping skill exploration, creative expression within personally meaningful activities, and clinical observation of social, cognitive, and kinesthetic performance skills. Pt response: Independent to initiate group participation however needed some assistance to gather materials, sequence, and adjust to workspace demands as needed. Demonstrated fair focus, planning, and problem solving for selected drawing/painting task. Able to ask for assistance as needed, and appropriately social with peers and staff. Pt will continue to be encouraged to attend groups for further asssesssment and to address goals identified on plan of care.
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
## 2023-12-22 NOTE — PROGRESS NOTES
12/21/23 1800   Group Therapy Session   Time Session Began 1610   Time Session Ended 1650   Total Time (minutes) 40   Total # Attendees 3   Group Type expressive therapy   Group Topic Covered balanced lifestyle;relaxation techniques   Group Session Detail Mindfulness   Patient Response/Contribution cooperative with task   Patient Participation Detail Cooperatively engaged in Evening Music Relaxation group to decrease anxiety and promote Mindfulness.  Calm affect, appropriately engaged in session, responding well to the music.   Emotive at times, presents as highly sensitive.  Positive participation.

## 2023-12-22 NOTE — PLAN OF CARE
"  Problem: Suicide Risk  Goal: Absence of Self-Harm  Outcome: Progressing     Problem: Sleep Disturbance  Goal: Adequate Sleep/Rest  Outcome: Progressing   Patient was alert and oriented. He was calm and cooperative. His mood was bright. His hygiene was fair. He went to group. He was complaint with a check in. He made a few paranoid and disorganized comments. We were talking about his medications and how effective they were, then he stated, \" Sometimes I can't eat a lot because it causes brain fog, I have to eat small portion. I try to eat healthier options.\" He also mentioned that he needs to go back home, but think his items are contaminated stating, \" I have items that I need from my apartment, but I just don't feel safe. I'm going to have to run in and get them and get out.\" Writer asked patient to explained further, he stated, \" I was just so depressed. I would , but nothing was getting clean. I feel like I wasn't getting anywhere. On a scale 0 out of 10, it got to a 10. There is a lot of old food and my bed is on the floor with no box spring.\" Patient mentioned that he has no support system, so this time of year is hard for him and he would be \" more than happy celebrating Seun here. \" Patient reported anxiety 5/10, he reported social anxiety and frequent thoughts about the future. He reported depression 8/10. He stated, \" I told the Doctor today that I've been having SI thoughts with no plan. I wasn't telling this to no one. I mainly get that at night when I'm laying in bed in the dark. My thoughts get dark.\" Patient denied wanting to act on them and contracted for safety. He denied SIB,HI, and hallucinations. He ate dinner this evening. He denied bowel and bladder issues. He was occasionally socialized with selective peers that he has gotten to know here. He appeared excited that he formed a few connections. He was vitally stable, BP was slightly elevated 143/98, recheck was 139/82. He was " "nonsymptomatic. He denied pain. Patient was tearful around 2020, he stated, \" I was just eating snack and I got this over whelming SI thoughts with no plan. I don't know how to express myself and embarrassed to even talk about it. I also think people were in my room, because I have things placed in a specific spot and they were moved and I'm trying to figure out why.\" Patient was given his evening medications and went to lay down after our talk.  He also received a PRN Hydroxyzine.   "

## 2023-12-22 NOTE — PROGRESS NOTES
"[facility], [place]   Psychiatric Progress Note  Hospital Day: 12        Interim History:   The patient's care was discussed with the treatment team during the daily team meeting and/or staff's chart notes were reviewed.  Staff report patient has been visible in the milieu, taking medications as prescribed, being more open with staff about his mood and SI, reporting ongoing passive SI, tearful at times, attending groups, making paranoid/delusional statements, slept 7 hour overnight.     Upon interview, patient reports just finished with breakfast, reports that he is trying to eat in moderation and showed writer that he ate half of his Ghanaian toast along with half of his potatoes.  Reports that his appetite has been okay.  He has no further issues with constipation, had a bowel movement this morning.  Reports that his mood is \"better right now\" and discussed how his mood tends to get worse throughout the day and feels more sad at night.  This also is the same with his suicidal thoughts.  He denies having any SI thoughts at this time.  Reports that his auditory hallucinations are less right now.  He is tolerating medications.  He is appreciative of support from staff, he agrees to let staff know should his mood started to worsen or having increasing suicidal thoughts.  No additional concerns.         Medications:      busPIRone  5 mg Oral BID    lurasidone  40 mg Oral Daily with lunch    polyethylene glycol  17 g Oral Daily    QUEtiapine  300 mg Oral At Bedtime    senna-docusate  2 tablet Oral BID    cholecalciferol  50 mcg Oral Daily          Allergies:   No Known Allergies       Labs:     No results found for this or any previous visit (from the past 48 hour(s)).         Psychiatric Examination:     /82   Pulse 105   Temp 98.3  F (36.8  C) (Oral)   Resp 18   Wt 133.4 kg (294 lb)   SpO2 94%   BMI 39.87 kg/m    Weight is 294 lbs 0 oz  Body mass index is 39.87 " "kg/m .    Orthostatic Vitals         Most Recent      Sitting Orthostatic /95 12/21 0850    Sitting Orthostatic Pulse (bpm) 105 12/21 0850    Standing Orthostatic /88 12/21 0850    Standing Orthostatic Pulse (bpm) 128 12/21 0850          Appearance: awake, alert and  untidy, wearing multiple layers of shirts  Attitude:   continues to be a bit guarded, mostly cooperative   Eye Contact:  fair  Mood:   \"better right now\"  continues to have some depression  Affect:  mood congruent,not tearful today   Speech:  clear, coherent and normal prosody  Language: fluent and intact in English  Psychomotor, Gait, Musculoskeletal:  no evidence of tardive dyskinesia, dystonia, or tics  Thought Process:  disorganized, slowly improving   Associations:  no loose associations  Thought Content:   denies current SI or HI, reports AH, that are negative, appears internally preoccupied at times; paranoid, delusional particularly around discussions re: family; denies VH  Insight:  limited  Judgement:  limited  Oriented to:   person, place, month, year  Attention Span and Concentration:  fair  Recent and Remote Memory:  limited  Fund of Knowledge:  appropriate           Precautions:     Behavioral Orders   Procedures    Code 1 - Restrict to Unit    Routine Programming     As clinically indicated    Status 15     Every 15 minutes.    Suicide precautions     Patients on Suicide Precautions should have a Combination Diet ordered that includes a Diet selection(s) AND a Behavioral Tray selection for Safe Tray - with utensils, or Safe Tray - NO utensils            Diagnoses:      Schizoaffective disorder, bipolar type (H)  ARETHA  ASD  ADHD per chart  Cannabis use per chart  Alcohol use per chart  Constipation, unspecified constipation type  Hx of Vit D deficiency   Hypokalemia, improved  Leukocytosis, improved          Assessment & Plan:   Assessment and hospital summary:  33 y M with history of schizoaffective disorder, anxiety, autism " and substance use who presented to the ED via EMS for SI and psychosis in context of medication non-adherence. Medically cleared in ED, was initially agreeable to remain in ED then requested to leave, placed on 72HH, started on lurasidone to target mood and psychosis and pt requested to try a new medication. Admitted to 10N under 72HH. UDS negative, other admission labs ordered. PTA quetiapine continued to furhter target psychosis. Pt also on buspirone for anxiety and some Miralax that was started in ED for constipation, he also received enema in ED per his request. Has been on senna/colace PTA, this was resumed and miralax moved to PRN. Pt also requested to be on vitamin D, has not had recent level, this was ordered, standard daily dosing ordered at this time and will determine if patient again requires high dose replacement as he has in the past. On 12/11 patient agreeable to signing in as voluntary patient. Also reports needing a new place to discharge to as not feeling safe in previous residence, CTC to explore. Continues to have some disorganization along with paranoia and reports of AH, will continue to adjust medications for stabilization as indicated/tolerated. Pt signed 12 hour intent to discharge on 12/16, unable to identify any safe plan, ongoing disorganization, placed on 72HH. Petition for MH commitment and [place] submitted 12/18, pt placed on court hold.      Psychiatric treatment/interventions:  Medications:   -continue PTA quetiapine 300mg at bedtime for psychosis and mood  -continue lurasidone at 40mg daily, moved to lunch time due to need to take with food; medication started in ED for mood and psychosis, had planned to optimize though pts intake appears inconsistent, may need to consider alternative if not frequently taking with enough food  -awaiting outcome of court proceedings/[place] before adjusting neuroleptics further   -continue Vit D3 50mcg daily, started 12/11 per pts request, Vit D level  33  -continue buspirone 5mg BID for anxiety     -PRN hydroxyzine 25mg every 4 hours anxiety  -PRN lorazapam 1mg every 4 hours PO or IM for agitation   -PRN olanzapine 5-10mg PO or 10mg IM TID for agitation, first line  -PRN trazodone 50mg at bedtime for sleep     -pt agreed to sign in voluntary 12/11, discontinued 72HH, pt signed intent to discharge on 12/16, placed on 72HH, petition for MH commitment with [place] filed 12/18 with Julio Stinson, requested [place] meds: Haldol, Olanzapine, Quetiapine, Lurasidone, Paliperidone; pt placed on court hold 12/20      The risks, benefits, alternatives and side effects have been discussed and are understood by the patient.     Laboratory/Imaging: no new labs ordered     Patient will be treated in therapeutic milieu with appropriate individual and group therapies as described.     Medical treatment/interventions:  Medical concerns: Pt requests daily Vit D, has hx of Vit D deficiency requiring high dose replacement, ordered 50mcg daily and Vit D level as above. Pt also has hx of constipation, was on scheduled Senna/Colace PTA, will resume and move miralax to PRN and continue to monitor. Pt also with hypokalemia in ED and leukoctyosis, will repeat labs in AM and address as indicated. Repeat labs WNL on 12/12, Vit D level WNL will continue daily supplementation as above. Pt in consistent historian re: bowel movements, continuing to monitor closely. Reported having last BM 12/22.      Disposition Plan   Reason for ongoing admission: poses an imminent risk to self and is unable to care for self due to severe psychosis or robert  Discharge location:  TBD, pt reports not feeling safe to return to previous residence, may consider IRTS  Discharge Medications: not ordered  Follow-up Appointments: not scheduled  Legal Status:  Admitted on 72HH,signed in voluntary on 12/11, placed on 72HH on 12/16 after requesting to discharge, petition for MH commitment and [place] filed 12/18 with Julio Stinson ;  pt currently on court hold     This document is created with the help of Dragon dictation system.  All grammatical/typing errors or context distortion are unintentional and inherent to software.      Patient has been seen and evaluated by me, Chante Snyder DO.
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
## 2023-12-22 NOTE — PROVIDER NOTIFICATION
12/22/23 0600   Sleep/Rest   Night Time # Hours 7 hours     Pt had a quiet night with no behavioral or safety concerns. Pt was observed sleeping the entire night, no acute events noted or reported.

## 2023-12-22 NOTE — CARE PLAN
12/22/23 1500   Group Therapy Session   Group Attendance attended group session   Time Session Began 1315   Time Session Ended 1400   Total Time (minutes) 45   Total # Attendees 3-4   Group Type recreation;life skill   Group Topic Covered balanced lifestyle;coping skills/lifestyle management;leisure exploration/use of leisure time   Patient Response/Contribution discussed personal experience with topic;cooperative with task   Patient Participation Detail Pt independently attended OT group this afternoon and was cooperative.  Pt was social with peers and staff and reports he is happy to be here and waiting in a new placement.  Pt is able to follow 2 step verbal directions.  Pt appears to be lacking some insight into current issues. Pt will continue to be offered groups and be encouraged to attend for ongoing assessment.

## 2023-12-22 NOTE — PLAN OF CARE
BEH IP Unit Acuity Rating Score (UARS)  Patient is given one point for every criteria they meet.    CRITERIA SCORING   On a 72 hour hold, court hold, committed, stay of commitment, or revocation 1    Patient LOS on BEH unit exceeds 20 days 0  LOS: 12   Patient under guardianship, 55+, otherwise medically complex, or under age 11 0   Suicide ideation without relief of precipitating factors 0   Current plan for suicide 0   Current plan for homicide 0   Imminent risk or actual attempt to seriously harm another without relief of factors precipitating the attempt 0   Severe dysfunction in daily living (ex: complete neglect for self care, extreme disruption in vegetative function, extreme deterioration in social interactions) 1   Recent (last 7 days) or current physical aggression in the ED or on unit 0   Restraints or seclusion episode in past 72 hours 0   Recent (last 7 days) or current verbal aggression, agitation, yelling, etc., while in the ED or unit 1   Active psychosis 1   Need for constant or near constant redirection (from leaving, from others, etc).  0   Intrusive or disruptive behaviors 0   TOTAL 4

## 2023-12-23 PROCEDURE — 250N000013 HC RX MED GY IP 250 OP 250 PS 637: Performed by: PSYCHIATRY & NEUROLOGY

## 2023-12-23 PROCEDURE — 250N000013 HC RX MED GY IP 250 OP 250 PS 637: Performed by: FAMILY MEDICINE

## 2023-12-23 PROCEDURE — 124N000002 HC R&B MH UMMC

## 2023-12-23 PROCEDURE — 250N000013 HC RX MED GY IP 250 OP 250 PS 637: Performed by: EMERGENCY MEDICINE

## 2023-12-23 PROCEDURE — 250N000013 HC RX MED GY IP 250 OP 250 PS 637: Performed by: STUDENT IN AN ORGANIZED HEALTH CARE EDUCATION/TRAINING PROGRAM

## 2023-12-23 PROCEDURE — G0177 OPPS/PHP; TRAIN & EDUC SERV: HCPCS

## 2023-12-23 RX ADMIN — HYDROXYZINE HYDROCHLORIDE 25 MG: 25 TABLET, FILM COATED ORAL at 08:25

## 2023-12-23 RX ADMIN — SENNOSIDES AND DOCUSATE SODIUM 2 TABLET: 8.6; 5 TABLET ORAL at 08:26

## 2023-12-23 RX ADMIN — Medication 50 MCG: at 08:26

## 2023-12-23 RX ADMIN — QUETIAPINE 300 MG: 100 TABLET ORAL at 20:27

## 2023-12-23 RX ADMIN — SENNOSIDES AND DOCUSATE SODIUM 2 TABLET: 8.6; 5 TABLET ORAL at 20:27

## 2023-12-23 RX ADMIN — BUSPIRONE HYDROCHLORIDE 5 MG: 5 TABLET ORAL at 08:26

## 2023-12-23 RX ADMIN — LURASIDONE HYDROCHLORIDE 40 MG: 40 TABLET, COATED ORAL at 12:48

## 2023-12-23 RX ADMIN — BUSPIRONE HYDROCHLORIDE 5 MG: 5 TABLET ORAL at 20:27

## 2023-12-23 RX ADMIN — POLYETHYLENE GLYCOL 3350 17 G: 17 POWDER, FOR SOLUTION ORAL at 08:26

## 2023-12-23 ASSESSMENT — ACTIVITIES OF DAILY LIVING (ADL)
ADLS_ACUITY_SCORE: 29
ORAL_HYGIENE: INDEPENDENT
HYGIENE/GROOMING: INDEPENDENT
ADLS_ACUITY_SCORE: 29
ORAL_HYGIENE: INDEPENDENT
HYGIENE/GROOMING: INDEPENDENT
ADLS_ACUITY_SCORE: 29
DRESS: INDEPENDENT
ADLS_ACUITY_SCORE: 29
LAUNDRY: WITH SUPERVISION
ADLS_ACUITY_SCORE: 29
DRESS: INDEPENDENT
ADLS_ACUITY_SCORE: 29
LAUNDRY: WITH SUPERVISION
ADLS_ACUITY_SCORE: 29

## 2023-12-23 NOTE — PLAN OF CARE
"Pt up this morning and states he is not feeling the best \"but trying to be positive\". Pt presents anxious and restless, poor eye contact. Pt is noted to be carrying a small pocket bible around with him and intermittently reading it. Writer offered hydroxyzine and pt accepted and then rested in his room. Pt continues to present with disorganized and tangential thought process. Affect brighter after resting. Pt attended OT group, but otherwise has been more withdrawn to self and isolative to his room. Pt reports that he has been having nightmares most nights since before he came into the hospital. Pt states last he \"slept horribly\" last night r/t nightmares and racing thoughts that \"would not stop\". Pt denies that the racing thoughts were SI thoughts and denies any SI/SIB/HI at this time as well. Pt also describes that sometimes he \"sees scary things from my nightmares\" when he is awake. Writer tried questioning pt about whether he believes this is a hallucination and pt is adamant that he is not having any AVH.     Pt fixated on believing his seroquel is not helpful for him. Today, pt asked about Trintellix stating his OP psychiatry provider had talked to him about trying this medication. Pt is asking about discontinuing seroquel and adding in trintellix with his latuda. Writer encouraged pt to speak with provider about this next week and pt was accepting of that.     Pt is medication compliant. Appetite and fluid intake adequate. VSS. Pt denies acute physical pain. No medication side effects reported or observed this shift. Hygiene is adequate. No issues with bowel or bladder observed or reported.    PRN medications utilized this shift include:  0825: Hydroxyzine 25 mg- anxiety- effective  "

## 2023-12-23 NOTE — CARE PLAN
"   12/23/23 1415   Group Therapy Session   Group Attendance attended group session   Time Session Began 1015   Time Session Ended 1100   Total Time (minutes) 45   Total # Attendees 3   Group Type community;recreation;life skill   Group Topic Covered cognitive activities;balanced lifestyle;leisure exploration/use of leisure time;structured socialization   Group Session Detail OT Leisure Group: Group activities to exercise cognitive skills while exploring leisure and socializing opportunities. \"Qwixx\" - a dice game where players make quick decisions based on adding the totals of dice rolls.   Patient Participation Detail Pt participated in a group activity playing a board game. Pt appeared social and friendly, expressing that they were interested in attending groups and be occupied. Pt was open to learning and playing an unfamiliar game, and was able to understand the instructions after asking clarifying questions and playing a few turns, however expressing that the game was challenging and that they did not understand the rules for a long time. Pt appeared engaged and focused on the activity.       "

## 2023-12-23 NOTE — PLAN OF CARE
"  Problem: Suicide Risk  Goal: Absence of Self-Harm  Outcome: Progressing     Alertness: Alert and oriented, disoriented to situation.   Affect: Bright upon approach  Mood: His mood was labile and calm. He appeared anxious at the start of shift, he was fidgety and restless. His mood improved. He was anxious when he was laying in the dark.  Appearance: Well groomed, showered and washed his clothing.  Thought process: He continues to be disorganized. He goes off topic and was hyper focused on washing his clothes and showering. Denied hallucinations. At 2133, patient reported he couldn't sleep because he had racing thoughts, he denied SI thoughts. He stated, \" they are about the past and future.\" He received chamomile tea and PRN medication.  Anxiety,depression,SI,SI,HI: Patient endorsed \"7/10 anxiety and depression.\" Patient denied SI,SIB,HI.  Social: Went to group. Paced up and down the soto with peers engaging in conversations and smiling. He played guitar in the lounge.   Skin: Skin appears intact from what writer could assess. No issues reported  Bowel/bladder: Denied issues.  Dinner: 90%  Vital signs/pain: Stable. Denied pain.   Medications: Compliant, no reminders needed. Thorough mouth check completed.  PRN's this shift: They were offered for anxiety, but declined initially. He received Hydroxyzine 25 mg at 2133 for racing thoughts.    "

## 2023-12-24 PROCEDURE — 124N000002 HC R&B MH UMMC

## 2023-12-24 PROCEDURE — 250N000013 HC RX MED GY IP 250 OP 250 PS 637: Performed by: FAMILY MEDICINE

## 2023-12-24 PROCEDURE — 250N000013 HC RX MED GY IP 250 OP 250 PS 637: Performed by: PSYCHIATRY & NEUROLOGY

## 2023-12-24 PROCEDURE — 250N000013 HC RX MED GY IP 250 OP 250 PS 637: Performed by: EMERGENCY MEDICINE

## 2023-12-24 PROCEDURE — 90853 GROUP PSYCHOTHERAPY: CPT

## 2023-12-24 PROCEDURE — 250N000013 HC RX MED GY IP 250 OP 250 PS 637: Performed by: STUDENT IN AN ORGANIZED HEALTH CARE EDUCATION/TRAINING PROGRAM

## 2023-12-24 RX ADMIN — QUETIAPINE 300 MG: 100 TABLET ORAL at 20:09

## 2023-12-24 RX ADMIN — LURASIDONE HYDROCHLORIDE 40 MG: 40 TABLET, COATED ORAL at 12:30

## 2023-12-24 RX ADMIN — SENNOSIDES AND DOCUSATE SODIUM 2 TABLET: 8.6; 5 TABLET ORAL at 09:09

## 2023-12-24 RX ADMIN — SENNOSIDES AND DOCUSATE SODIUM 2 TABLET: 8.6; 5 TABLET ORAL at 20:09

## 2023-12-24 RX ADMIN — Medication 50 MCG: at 09:08

## 2023-12-24 RX ADMIN — POLYETHYLENE GLYCOL 3350 17 G: 17 POWDER, FOR SOLUTION ORAL at 09:08

## 2023-12-24 RX ADMIN — BUSPIRONE HYDROCHLORIDE 5 MG: 5 TABLET ORAL at 20:09

## 2023-12-24 RX ADMIN — HYDROXYZINE HYDROCHLORIDE 25 MG: 25 TABLET, FILM COATED ORAL at 14:58

## 2023-12-24 RX ADMIN — BUSPIRONE HYDROCHLORIDE 5 MG: 5 TABLET ORAL at 09:08

## 2023-12-24 ASSESSMENT — ACTIVITIES OF DAILY LIVING (ADL)
LAUNDRY: WITH SUPERVISION
ADLS_ACUITY_SCORE: 29
DRESS: INDEPENDENT
ADLS_ACUITY_SCORE: 29
DRESS: INDEPENDENT
HYGIENE/GROOMING: INDEPENDENT
ADLS_ACUITY_SCORE: 29
ORAL_HYGIENE: INDEPENDENT
LAUNDRY: WITH SUPERVISION
ADLS_ACUITY_SCORE: 29
HYGIENE/GROOMING: INDEPENDENT
ADLS_ACUITY_SCORE: 29
ORAL_HYGIENE: INDEPENDENT
ADLS_ACUITY_SCORE: 29
ADLS_ACUITY_SCORE: 29

## 2023-12-24 NOTE — PLAN OF CARE
"Alertness: alert and oriented  Affect: flat, but later brightened  Mood: anxious  Appearance: well groomed  Thought Process: disorganized and paranoid. He mentioned his mom is not his bio mom. He feels like he has a different family. Patient reported, he's been trying to put pieces together for a long time. Patient was paranoid about the color of his pill. He reports having nightmares.  Anxiety, depression, SI,SIB,HI: Patient reported \" high anxiety \" at the start of shift, but improved after he repeated some affirmations to himself. He denied SI,SIB,and HI.  Social: He kept to himself this shift and refused to go to group.  Skin: Intact from what writer could assess  Bowel/bladder: Denied  Dinner: 100%  Vital signs/pain: Vitally stable, hr slightly elevated r/t high anxiety about a nightmare. Denied pain.   Medications: Medication compliant, no reminders needed  PRN's this shift: none  "

## 2023-12-24 NOTE — PLAN OF CARE
"Pt states he slept well last night, but states he had 3 \"very vivid dreams\" which he described as \"weird\". Pt has been withdrawn to self and isolative to his room. When out for meals, pt is carrying his small bible with him. Pt continues to present with paranoid, disorganized thought process. Pt denies SI/SIB/HI/AVH. Pt still endorsing depression and anxiety throughout the day and took PRN hydroxyzine this afternoon.     Pt is medication compliant. Appetite and fluid intake adequate. VSS. Pt denies acute physical pain. No medication side effects reported or observed this shift. Hygiene is adequate. No issues with bowel or bladder observed or reported.    PRN medications utilized this shift include:  Hydroxyzine 25 mg  "

## 2023-12-24 NOTE — PLAN OF CARE
"NOC Shift Report     Pt in bed at beginning of shift, breathing quiet and unlabored. Pt slept 6.5 hours.      Patient woke up once during the shift and appeared anxious. He wanted to take a shower however was informed that showers are available at 0730. Patient said \"Okay well this is really embarrassing, I don't want to talk about it, I just need a shower.\". Writer offered patient new scrubs and sheets and he agreed. Upon removing his sheets, they were not damp, no urine or fecal smell present. Nothing wet was noted on patient's pants either. Patient didn't want to discuss what occurred however he did not appear to have soiled himself.     No PRNs given. Will continue to monitor.    "

## 2023-12-25 PROCEDURE — 250N000013 HC RX MED GY IP 250 OP 250 PS 637: Performed by: FAMILY MEDICINE

## 2023-12-25 PROCEDURE — 250N000013 HC RX MED GY IP 250 OP 250 PS 637: Performed by: PSYCHIATRY & NEUROLOGY

## 2023-12-25 PROCEDURE — 124N000002 HC R&B MH UMMC

## 2023-12-25 PROCEDURE — 250N000013 HC RX MED GY IP 250 OP 250 PS 637: Performed by: STUDENT IN AN ORGANIZED HEALTH CARE EDUCATION/TRAINING PROGRAM

## 2023-12-25 PROCEDURE — H2032 ACTIVITY THERAPY, PER 15 MIN: HCPCS

## 2023-12-25 PROCEDURE — 250N000013 HC RX MED GY IP 250 OP 250 PS 637: Performed by: EMERGENCY MEDICINE

## 2023-12-25 RX ADMIN — BUSPIRONE HYDROCHLORIDE 5 MG: 5 TABLET ORAL at 08:20

## 2023-12-25 RX ADMIN — Medication 50 MCG: at 08:20

## 2023-12-25 RX ADMIN — SENNOSIDES AND DOCUSATE SODIUM 2 TABLET: 8.6; 5 TABLET ORAL at 19:54

## 2023-12-25 RX ADMIN — QUETIAPINE 300 MG: 100 TABLET ORAL at 19:54

## 2023-12-25 RX ADMIN — LURASIDONE HYDROCHLORIDE 40 MG: 40 TABLET, COATED ORAL at 12:56

## 2023-12-25 RX ADMIN — POLYETHYLENE GLYCOL 3350 17 G: 17 POWDER, FOR SOLUTION ORAL at 08:20

## 2023-12-25 RX ADMIN — HYDROXYZINE HYDROCHLORIDE 25 MG: 25 TABLET, FILM COATED ORAL at 19:54

## 2023-12-25 RX ADMIN — SENNOSIDES AND DOCUSATE SODIUM 2 TABLET: 8.6; 5 TABLET ORAL at 08:20

## 2023-12-25 RX ADMIN — BUSPIRONE HYDROCHLORIDE 5 MG: 5 TABLET ORAL at 19:54

## 2023-12-25 ASSESSMENT — ACTIVITIES OF DAILY LIVING (ADL)
DRESS: INDEPENDENT
LAUNDRY: UNABLE TO COMPLETE
HYGIENE/GROOMING: INDEPENDENT
ORAL_HYGIENE: INDEPENDENT
ADLS_ACUITY_SCORE: 29
DRESS: INDEPENDENT
ADLS_ACUITY_SCORE: 29
HYGIENE/GROOMING: INDEPENDENT
ORAL_HYGIENE: INDEPENDENT
ADLS_ACUITY_SCORE: 29
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
## 2023-12-25 NOTE — PLAN OF CARE
"  Problem: Suicide Risk  Goal: Absence of Self-Harm  Outcome: Progressing      Patient spent most of the evening isolating in his room and occasionally coming outside to pace in the hallway, observed on numerous time [facility]. Patient reported anxiety 3/10 and depression 4/10, denies auditory and visual hallucination. Patient was able to complete laundry with help from a staff member, affect flat/blunted with calm mood. Ate 100% of meal with adequate fluid intake, no complaint of nausea or stomach discomfort. Patient was hyper focused on a pillow case that was in his room \" that pillow case has been by the window since I move to this room, why is it there and who put it there?\". Writer explained to patient that it was just an extra pillow case and could be used when the other one gets dirty, he appeared disorganize but agreeable to writer information. Denies SI/SIB and HI, contract for safety and medication compliant with no cheeking noted.       /80 (BP Location: Right arm)   Pulse 99   Temp 98.5  F (36.9  C) (Oral)   Resp 16   Wt 133.4 kg (294 lb)   SpO2 98%   BMI 39.87 kg/m                           "

## 2023-12-25 NOTE — PLAN OF CARE
"Connor is alert and oriented this shift. Observed spending most of his time in the dining area. Social with select male peers, otherwise keeps to himself.   On approach, pt presents with a full range affect. He is calm and cooperative with care, polite and pleasant. Connor is familiar with his medication regimen and able to accurately recite his medications. Pt reports that Latuda has been particularly helpful with racing thoughts and help his thoughts be reality based.   He c/o insomnia, and states he only slept \"maybe 1-2 hours last night.\" He again reports \"weird\" dreams that are distressing to him. Pt states that he is trying to develop a routine for his days here. His thoughts appear more organized today.   He rates anxiety 4/10, depression 4/10. He describes his mood as \"not the best, but definitely better.\" Denies SI/HI/SIB. He denies hallucinations or paranoid thoughts, but states that yesterday was a harder day for him, \"because my PTSD was triggered by a phone call with my dad.\"  This afternoon pt appeared preoccupied with the paperwork he's been given (Bill of rights etc) and asked writer for new copies because he believed he was missing pages. Pt found all pages and made no further mention.   Pt shares that he has been writing a story, \"similar to [employer]\".     VSS. /87   Pulse 91   Temp 98.3  F (36.8  C) (Oral)   Resp 14   Wt 133.4 kg (294 lb)   SpO2 98%   BMI 39.87 kg/m    Pt denies pain concerns. Pt appears to have a good appetite and hydrating well.       Problem: Adult Behavioral Health Plan of Care  Goal: Plan of Care Review  Outcome: Progressing  Flowsheets (Taken 12/25/2023 0820)  Patient Agreement with Plan of Care: agrees     Problem: Suicide Risk  Goal: Absence of Self-Harm  Outcome: Progressing   Goal Outcome Evaluation:    Plan of Care Reviewed With: patient                   "

## 2023-12-26 PROCEDURE — 250N000013 HC RX MED GY IP 250 OP 250 PS 637: Performed by: STUDENT IN AN ORGANIZED HEALTH CARE EDUCATION/TRAINING PROGRAM

## 2023-12-26 PROCEDURE — 250N000013 HC RX MED GY IP 250 OP 250 PS 637: Performed by: PSYCHIATRY & NEUROLOGY

## 2023-12-26 PROCEDURE — G0177 OPPS/PHP; TRAIN & EDUC SERV: HCPCS

## 2023-12-26 PROCEDURE — 250N000013 HC RX MED GY IP 250 OP 250 PS 637: Performed by: FAMILY MEDICINE

## 2023-12-26 PROCEDURE — 99231 SBSQ HOSP IP/OBS SF/LOW 25: CPT | Performed by: PSYCHIATRY & NEUROLOGY

## 2023-12-26 PROCEDURE — 124N000002 HC R&B MH UMMC

## 2023-12-26 PROCEDURE — 250N000013 HC RX MED GY IP 250 OP 250 PS 637: Performed by: EMERGENCY MEDICINE

## 2023-12-26 RX ADMIN — HYDROXYZINE HYDROCHLORIDE 25 MG: 25 TABLET, FILM COATED ORAL at 19:39

## 2023-12-26 RX ADMIN — SENNOSIDES AND DOCUSATE SODIUM 2 TABLET: 8.6; 5 TABLET ORAL at 19:38

## 2023-12-26 RX ADMIN — POLYETHYLENE GLYCOL 3350 17 G: 17 POWDER, FOR SOLUTION ORAL at 08:43

## 2023-12-26 RX ADMIN — LURASIDONE HYDROCHLORIDE 40 MG: 40 TABLET, COATED ORAL at 12:54

## 2023-12-26 RX ADMIN — QUETIAPINE 300 MG: 100 TABLET ORAL at 19:38

## 2023-12-26 RX ADMIN — SENNOSIDES AND DOCUSATE SODIUM 2 TABLET: 8.6; 5 TABLET ORAL at 08:43

## 2023-12-26 RX ADMIN — BUSPIRONE HYDROCHLORIDE 5 MG: 5 TABLET ORAL at 08:43

## 2023-12-26 RX ADMIN — BUSPIRONE HYDROCHLORIDE 5 MG: 5 TABLET ORAL at 19:38

## 2023-12-26 RX ADMIN — ALUMINUM HYDROXIDE, MAGNESIUM HYDROXIDE, AND SIMETHICONE 30 ML: 1200; 120; 1200 SUSPENSION ORAL at 13:26

## 2023-12-26 RX ADMIN — Medication 50 MCG: at 08:43

## 2023-12-26 ASSESSMENT — ACTIVITIES OF DAILY LIVING (ADL)
ADLS_ACUITY_SCORE: 29
ADLS_ACUITY_SCORE: 29
DRESS: INDEPENDENT
ADLS_ACUITY_SCORE: 29
ORAL_HYGIENE: INDEPENDENT
ADLS_ACUITY_SCORE: 29
HYGIENE/GROOMING: INDEPENDENT
ADLS_ACUITY_SCORE: 29

## 2023-12-26 NOTE — PROVIDER NOTIFICATION
12/26/23 1128   Behavioral Team Discussion   Participants Andrew Rodriguez MD; Laquita Cline, RN; LUANNE Prado   Progress Improving   Anticipated length of stay 21-24 days   Continued Stay Criteria/Rationale Connor presents with concern for SI, decompensating schizoaffective disorder with worsening symptoms of psychosis (command AH), medication non-adherence, and poor self-care. He requires symptom stabilization, medication management, and supportive discharge plan.   Precautions SI   Plan Gather collateral information, follow up on status of commitment and [facility] orders being signed given waiver and agreement to commitment completed last week. Consider possible adjustments to medications. Work to increase outpatient supports while considering alternative placement options including IRTS.   Anticipated Discharge Disposition IRTS

## 2023-12-26 NOTE — PLAN OF CARE
Brief Individual Therapy Note    Attempted to meet with Pt in hallway. Pt declined stating he is going to nap. Writer will continue to attempt to meet with pt. Met with Pt for 5 minutes.

## 2023-12-26 NOTE — PLAN OF CARE
Goal Outcome Evaluation:    Plan of Care Reviewed With: patient          Patient was visible in the milieu, sitting in the dining reading. He was pleasant during check-in, he reported feeling anxious and depressed rated both 4/10, but he reported doing better today. He denied having any pain or discomfort, denied SI/SIB/HI and AVH. He is eating and drinking adequately, he denied having any problem with his bowel and bladder. He appears to be somehow disorganized and forgetful. Patient approached writer at 7.55 pm requesting for his HS medications, he endorsed high anxiety reporting to writer that this was triggered because he was late to take his HS medication. Patient told writer that he has a routine of taking his medication between 8-8.15pm Writer asked patient if he knew what time it was and patient stated that it is past 8 o'clock. Writer tried to reorient and remind patient that it was not 8 o'clock yet but patient appeared to be more fixated on it being after 8. Writer continued to reorient patient and show him the clock, patient later agreed it was not eight yet and was able to calm down after he took his med's and PRN Hydroxyzine.    Patient's VS stable, no medication side effects reported or noted, to continue with POC.

## 2023-12-26 NOTE — PLAN OF CARE
"Connor is alert and well oriented this shift.   He has been visible in the milieu. Social with select peers. Has been making phone calls thru the day working on discharge details and to make sure his vehicle is secure at his apartment.   He reports that his sleep was improved last night and had no c/o nightmares. He describes his mood as \"good\" today and has a full range affect. Rates anxiety 4/10, depression 4/10. Denies suicidal ideation. Reports his thoughts are \"really clear\" this morning and denies paranoid or delusional thought content. Denies hallucinations. No overt psychotic sx noted.   Medication compliant. Pt reports that his medication regimen has been helpful.   He had signed a waiver for commitment, so did not need to attend hearing today.     VSS. /84   Pulse 98   Temp 98.2  F (36.8  C) (Oral)   Resp 16   Wt 134.3 kg (296 lb 1.6 oz)   SpO2 96%   BMI 40.16 kg/m      PRN Maalox given for c/o heartburn this afternoon.     Addendum 1515: Pt is asking to discharge today. CTC and writer explained that he is in the Commitment process and will be held until the paperwork is received and a discharge plan is developed. Pt does not seem to track the information. He continues to lobby for discharge, citing that he has been here longer than 72 hours etc. He states he has many things he needs to take care of including paying his rent and cleaning up his apartment. Writer again reiterated that he will not be discharged today due to pending Commitment. Pt was polite and eventually receptive of that information.     Problem: Adult Behavioral Health Plan of Care  Goal: Plan of Care Review  Outcome: Progressing  Flowsheets (Taken 12/26/2023 0845)  Patient Agreement with Plan of Care: agrees     Problem: Suicide Risk  Goal: Absence of Self-Harm  Outcome: Progressing  Intervention: Promote Psychosocial Wellbeing  Recent Flowsheet Documentation  Taken 12/26/2023 0845 by Laquita Cline RN  Supportive " Measures:   active listening utilized   self-care encouraged   Goal Outcome Evaluation:    Plan of Care Reviewed With: patient

## 2023-12-26 NOTE — PROGRESS NOTES
/81 (BP Location: Left arm, Patient Position: Sitting, Cuff Size: Adult Large)   Pulse 95   Temp 98.6  F (37  C) (Oral)   Resp 18   Wt 134.3 kg (296 lb 1.6 oz)   SpO2 98%   BMI 40.16 kg/m    Iso:  No active isolations  Diet: Regular Diet Adult  Mental Status:   Alert and oriented x 4.     RN Assessment:  SI/Self harm:  Denied  Aggression/agitation/HI:  Denied  AVH:  Denied  Sleep:No issues   PRN Med: No PRNs administered this shift  Medication : Compliant  Physical Complaints/Issues: No issues  I & O: eating and drinking well  LBM: 12/26/23  ADLs: independent  Visits: 0  Vitals:  WNL  COVID 19 Assessment:    Milieu Participation:  Behavior:Calm and cooperative  Pt is alert and oriented. Denied having pain and discomfort. Endorsed anxiety in the scale of 6/10. ATARAX 25 mg was administered and was effective. All pt needs were met. Continue with current care plan.

## 2023-12-26 NOTE — PLAN OF CARE

## 2023-12-26 NOTE — CARE PLAN
"   12/25/23 1900   Group Therapy Session   Group Attendance attended group session   Time Session Began 1800   Time Session Ended 1845   Total Time (minutes) 45   Total # Attendees 6   Group Type expressive therapy   Group Topic Covered emotions/expression   Patient Response/Contribution cooperative with task       Art Therapy directive was to create art in response to \"a wish/hope for yourself and wishes/hopes for others\" using drawing media of pts choice.  Goals of directive: identifying personal strengths and goals, self compassion and compassion for others, future focused directive, assessing pts motivation for change.  Pt was an engaged participant, focused on task for the full duration of group.  Pt finished artwork and briefly shared with group. Pt identified both positive goals/wishes for himself  and wishes for others.  Pts mood was calm, engaged participant.           "

## 2023-12-26 NOTE — CARE PLAN
Occupational Therapy Group Note:     12/26/23 1400   Group Therapy Session   Group Attendance attended group session   Time Session Began 1315   Time Session Ended 1400   Total Time (minutes) 45   Total # Attendees 6   Group Type task skill;recreation   Group Topic Covered cognitive activities;leisure exploration/use of leisure time;problem-solving   Group Session Detail visual-spatial group game   Patient Response/Contribution cooperative with task   Patient Participation Detail Pt actively participated in a structured occupational therapy group with a focus on a visual-spatial leisure task. Pt was able to follow 2-step directions of the novel task, and demonstrated strategic planning and problem solving throughout the task. Pt remained focused and engaged for the full duration of group. Appeared motivated to seek out strategic moves independently. Calm and cooperative.

## 2023-12-26 NOTE — PROGRESS NOTES
"[facility], [place]   Psychiatric Progress Note  Hospital Day: 16          Interim History:   Patient was admitted due to suicidal ideation and paranoia.      Patient seen and chart reviewed. Case discussed in multi-disciplinary treatment team      According to Nursing report:  Patient is doing better overall. He can get fixated on things and perseverates. He denies suicidal thoughts or paranoia.        According to Nursing notes from yesterday:  Connor is alert and oriented this shift. Observed spending most of his time in the dining area. Social with select male peers, otherwise keeps to himself.   On approach, pt presents with a full range affect. He is calm and cooperative with care, polite and pleasant. Connor is familiar with his medication regimen and able to accurately recite his medications. Pt reports that Latuda has been particularly helpful with racing thoughts and help his thoughts be reality based.   He c/o insomnia, and states he only slept \"maybe 1-2 hours last night.\" He again reports \"weird\" dreams that are distressing to him. Pt states that he is trying to develop a routine for his days here. His thoughts appear more organized today.   He rates anxiety 4/10, depression 4/10. He describes his mood as \"not the best, but definitely better.\" Denies SI/HI/SIB. He denies hallucinations or paranoid thoughts, but states that yesterday was a harder day for him, \"because my PTSD was triggered by a phone call with my dad.\"  This afternoon pt appeared preoccupied with the paperwork he's been given (Bill of rights etc) and asked writer for new copies because he believed he was missing pages. Pt found all pages and made no further mention.   Pt shares that he has been writing a story, \"similar to [employer]\".     Patient was visible in the milieu, sitting in the dining reading. He was pleasant during check-in, he reported feeling anxious and depressed rated both 4/10, " but he reported doing better today. He denied having any pain or discomfort, denied SI/SIB/HI and AVH. He is eating and drinking adequately, he denied having any problem with his bowel and bladder. He appears to be somehow disorganized and forgetful. Patient approached writer at 7.55 pm requesting for his HS medications, he endorsed high anxiety reporting to writer that this was triggered because he was late to take his HS medication. Patient told writer that he has a routine of taking his medication between 8-8.15pm Writer asked patient if he knew what time it was and patient stated that it is past 8 o'clock. Writer tried to reorient and remind patient that it was not 8 o'clock yet but patient appeared to be more fixated on it being after 8. Writer continued to reorient patient and show him the clock, patient later agreed it was not eight yet and was able to calm down after he took his med's and PRN Hydroxyzine.    Patient's VS stable, no medication side effects reported or noted, to continue with POC.        According to  :  Legal Status:  Patient is on a court hold in [facility] (as of 12/20/2023).   Case No. [id number]  Petition for MI Commitment and [facility] initiated in [facility] on 12/18/2023.   Plan is for IRTS    On interview today:  Patient denies suicidal or homicidal thoughts and denies hallucinations. Patient is not revealing delusions on interview. Patient denies side effects to medications. He feels that his current doses of Seroquel and Latuda are working well.  Patient is hoping for IRTS placement soon.      ROS:Patient has no other physical complaints today.      Vital signs:  Temp: 97.5  F (36.4  C) Temp src: Oral BP: 121/83 Pulse: 96     SpO2: 96 % O2 Device: None (Room air)     Weight:  (declined)  Estimated body mass index is 39.87 kg/m  as calculated from the following:    Height as of 4/30/23: 1.829 m (6').    Weight as of this encounter: 133.4 kg (294 lb).             "    Medications:       busPIRone  5 mg Oral BID     lurasidone  40 mg Oral Daily with lunch     polyethylene glycol  17 g Oral Daily     QUEtiapine  300 mg Oral At Bedtime     senna-docusate  2 tablet Oral BID     cholecalciferol  50 mcg Oral Daily          Allergies:   No Known Allergies       Labs:     No results found for this or any previous visit (from the past 48 hour(s)).         Psychiatric Examination:     /83 (BP Location: Right arm, Patient Position: Sitting)   Pulse 96   Temp 97.5  F (36.4  C) (Oral)   Resp 14   Wt 133.4 kg (294 lb)   SpO2 96%   BMI 39.87 kg/m    Weight is 294 lbs 0 oz  Body mass index is 39.87 kg/m .    Orthostatic Vitals         Most Recent      Sitting Orthostatic /95 12/21 0850    Sitting Orthostatic Pulse (bpm) 105 12/21 0850    Standing Orthostatic /88 12/21 0850    Standing Orthostatic Pulse (bpm) 128 12/21 0850          Appearance: awake, alert and  untidy, wearing multiple layers of shirts  Attitude:   continues to be a bit guarded, mostly cooperative   Eye Contact:  fair  Mood:   \"better right now\"  continues to have some depression  Affect:  mood congruent,not tearful today   Speech:  clear, coherent and normal prosody  Language: fluent and intact in English  Psychomotor, Gait, Musculoskeletal:  no evidence of tardive dyskinesia, dystonia, or tics  Thought Process:  disorganized, slowly improving   Associations:  no loose associations  Thought Content:   denies current SI or HI, reports AH, that are negative, appears internally preoccupied at times; paranoid, delusional particularly around discussions re: family; denies VH  Insight:  limited  Judgement:  limited  Oriented to:   person, place, month, year  Attention Span and Concentration:  fair  Recent and Remote Memory:  limited  Fund of Knowledge:  appropriate    Minimal change in mental status in the past 24 hours           Precautions:     Behavioral Orders   Procedures     Code 1 - Restrict to Unit "     Routine Programming     As clinically indicated     Status 15     Every 15 minutes.     Suicide precautions     Patients on Suicide Precautions should have a Combination Diet ordered that includes a Diet selection(s) AND a Behavioral Tray selection for Safe Tray - with utensils, or Safe Tray - NO utensils            Diagnoses:    Schizoaffective disorder, bipolar type (H)      Schizoaffective disorder, bipolar type (H)  ARETHA  ASD  ADHD per chart  Cannabis use per chart  Alcohol use per chart  Constipation, unspecified constipation type  Hx of Vit D deficiency   Hypokalemia, improved  Leukocytosis, improved          Assessment & Plan:   Assessment and hospital summary:  33 y M with history of schizoaffective disorder, anxiety, autism and substance use who presented to the ED via EMS for SI and psychosis in context of medication non-adherence. Medically cleared in ED, was initially agreeable to remain in ED then requested to leave, placed on 72HH, started on lurasidone to target mood and psychosis and pt requested to try a new medication. Admitted to 10N under 72HH. UDS negative, other admission labs ordered. PTA quetiapine continued to furhter target psychosis. Pt also on buspirone for anxiety and some Miralax that was started in ED for constipation, he also received enema in ED per his request. Has been on senna/colace PTA, this was resumed and miralax moved to PRN. Pt also requested to be on vitamin D, has not had recent level, this was ordered, standard daily dosing ordered at this time and will determine if patient again requires high dose replacement as he has in the past. On 12/11 patient agreeable to signing in as voluntary patient. Also reports needing a new place to discharge to as not feeling safe in previous residence, CTC to explore. Continues to have some disorganization along with paranoia and reports of AH, will continue to adjust medications for stabilization as indicated/tolerated. Pt signed 12  hour intent to discharge on 12/16, unable to identify any safe plan, ongoing disorganization, placed on 72HH. Petition for MH commitment and Mc[facility]on submitted 12/18, pt placed on court hold.      Psychiatric treatment/interventions:  Medications:   -continue PTA quetiapine 300mg at bedtime for psychosis and mood  -continue lurasidone at 40mg daily, moved to lunch time due to need to take with food; medication started in ED for mood and psychosis, had planned to optimize though pts intake appears inconsistent, may need to consider alternative if not frequently taking with enough food  -awaiting outcome of court proceedings/Mckeon before adjusting neuroleptics further   -continue Vit D3 50mcg daily, started 12/11 per pts request, Vit D level 33  -continue buspirone 5mg BID for anxiety     -PRN hydroxyzine 25mg every 4 hours anxiety  -PRN lorazapam 1mg every 4 hours PO or IM for agitation   -PRN olanzapine 5-10mg PO or 10mg IM TID for agitation, first line  -PRN trazodone 50mg at bedtime for sleep     -pt agreed to sign in voluntary 12/11, discontinued 72HH, pt signed intent to discharge on 12/16, placed on 72HH, petition for MH commitment with Mc[facility]on filed 12/18 with Julio Stinson, requested [facility] meds: Haldol, Olanzapine, Quetiapine, Lurasidone, Paliperidone; pt placed on court hold 12/20      The risks, benefits, alternatives and side effects have been discussed and are understood by the patient.     Laboratory/Imaging: no new labs ordered     Patient will be treated in therapeutic milieu with appropriate individual and group therapies as described.     Medical treatment/interventions:  Medical concerns: Pt requests daily Vit D, has hx of Vit D deficiency requiring high dose replacement, ordered 50mcg daily and Vit D level as above. Pt also has hx of constipation, was on scheduled Senna/Colace PTA, will resume and move miralax to PRN and continue to monitor. Pt also with hypokalemia in ED and leukoctyosis, will repeat  labs in AM and address as indicated. Repeat labs WNL on 12/12, Vit D level WNL will continue daily supplementation as above. Pt in consistent historian re: bowel movements, continuing to monitor closely. Reported having last BM 12/22.      Disposition Plan   Reason for ongoing admission: poses an imminent risk to self and is unable to care for self due to severe psychosis or robert  Discharge location:  TBD, pt reports not feeling safe to return to previous residence, may consider IRTS  Discharge Medications: not ordered  Follow-up Appointments: not scheduled  Legal Status:  Admitted on 72HH,signed in voluntary on 12/11, placed on 72HH on 12/16 after requesting to discharge, petition for MH commitment and [facility] filed 12/18 with Julio Stinson ; pt currently on court hold     No further change in treatment plan  Patient seen, chart reviewed, case reviewed with  and with nursing.   Case reviewed in multi-disciplinary treatment team.    Andrew Rodriguez MD
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
## 2023-12-27 VITALS
WEIGHT: 296.1 LBS | HEART RATE: 95 BPM | SYSTOLIC BLOOD PRESSURE: 112 MMHG | DIASTOLIC BLOOD PRESSURE: 81 MMHG | RESPIRATION RATE: 18 BRPM | OXYGEN SATURATION: 98 % | TEMPERATURE: 97.9 F | BODY MASS INDEX: 40.16 KG/M2

## 2023-12-27 PROCEDURE — 90837 PSYTX W PT 60 MINUTES: CPT

## 2023-12-27 PROCEDURE — 250N000013 HC RX MED GY IP 250 OP 250 PS 637: Performed by: PSYCHIATRY & NEUROLOGY

## 2023-12-27 PROCEDURE — 99239 HOSP IP/OBS DSCHRG MGMT >30: CPT | Performed by: PSYCHIATRY & NEUROLOGY

## 2023-12-27 PROCEDURE — 250N000013 HC RX MED GY IP 250 OP 250 PS 637: Performed by: EMERGENCY MEDICINE

## 2023-12-27 RX ORDER — HYDROXYZINE HYDROCHLORIDE 25 MG/1
25 TABLET, FILM COATED ORAL EVERY 6 HOURS PRN
Qty: 90 TABLET | Refills: 1 | Status: ON HOLD | OUTPATIENT
Start: 2023-12-27 | End: 2024-01-25

## 2023-12-27 RX ORDER — VITAMIN B COMPLEX
50 TABLET ORAL DAILY
Qty: 30 TABLET | Refills: 0 | Status: ON HOLD | OUTPATIENT
Start: 2023-12-28 | End: 2024-01-25

## 2023-12-27 RX ORDER — BUSPIRONE HYDROCHLORIDE 5 MG/1
5 TABLET ORAL 2 TIMES DAILY
Qty: 60 TABLET | Refills: 1 | Status: ON HOLD | OUTPATIENT
Start: 2023-12-27 | End: 2024-01-25

## 2023-12-27 RX ORDER — POLYETHYLENE GLYCOL 3350 17 G/17G
17 POWDER, FOR SOLUTION ORAL DAILY
Qty: 510 G | Refills: 1 | Status: ON HOLD | OUTPATIENT
Start: 2023-12-27 | End: 2024-01-25

## 2023-12-27 RX ORDER — QUETIAPINE FUMARATE 300 MG/1
300 TABLET, FILM COATED ORAL AT BEDTIME
Qty: 30 TABLET | Refills: 1 | Status: ON HOLD | OUTPATIENT
Start: 2023-12-27 | End: 2024-01-25

## 2023-12-27 RX ORDER — LURASIDONE HYDROCHLORIDE 40 MG/1
40 TABLET, FILM COATED ORAL
Qty: 30 TABLET | Refills: 1 | Status: ON HOLD | OUTPATIENT
Start: 2023-12-27 | End: 2024-01-25

## 2023-12-27 RX ADMIN — SENNOSIDES AND DOCUSATE SODIUM 2 TABLET: 8.6; 5 TABLET ORAL at 08:15

## 2023-12-27 RX ADMIN — BUSPIRONE HYDROCHLORIDE 5 MG: 5 TABLET ORAL at 08:15

## 2023-12-27 RX ADMIN — POLYETHYLENE GLYCOL 3350 17 G: 17 POWDER, FOR SOLUTION ORAL at 08:16

## 2023-12-27 RX ADMIN — LURASIDONE HYDROCHLORIDE 40 MG: 40 TABLET, COATED ORAL at 12:39

## 2023-12-27 RX ADMIN — Medication 50 MCG: at 08:15

## 2023-12-27 ASSESSMENT — ACTIVITIES OF DAILY LIVING (ADL)
ORAL_HYGIENE: INDEPENDENT
HYGIENE/GROOMING: INDEPENDENT
ADLS_ACUITY_SCORE: 29
DRESS: INDEPENDENT
ADLS_ACUITY_SCORE: 29
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
## 2023-12-27 NOTE — PROGRESS NOTES
12/26/23 1920   Group Therapy Session   Group Attendance attended group session   Time Session Began 1600   Time Session Ended 1645   Total Time (minutes) 45   Total # Attendees 6   Group Type recreation   Group Session Detail Tapple, category activity for healthy leisure exploration, concentration, cognitive processing, tracking, mood stabilization, reality-based activity, coping, an opportunity to experience success and socialization.   Patient Participation Detail Pt was respectful, pleasant, and engaged in the group.  Pt was very focused on some of the relationships he has with male peers.  Pt was able to track the activity and demonstrated an adequate fund of knowledge.

## 2023-12-27 NOTE — PLAN OF CARE
BEH IP Unit Acuity Rating Score (UARS)  Patient is given one point for every criteria they meet.    CRITERIA SCORING   On a 72 hour hold, court hold, committed, stay of commitment, or revocation 1   Patient LOS on BEH unit exceeds 20 days 0  LOS: 17   Patient under guardianship, 55+, otherwise medically complex, or under age 11 0   Suicide ideation without relief of precipitating factors 0   Current plan for suicide 0   Current plan for homicide 0   Imminent risk or actual attempt to seriously harm another without relief of factors precipitating the attempt 0   Severe dysfunction in daily living (ex: complete neglect for self care, extreme disruption in vegetative function, extreme deterioration in social interactions) 1   Recent (last 7 days) or current physical aggression in the ED or on unit 0   Restraints or seclusion episode in past 72 hours 0   Recent (last 7 days) or current verbal aggression, agitation, yelling, etc., while in the ED or unit 0   Active psychosis 1   Need for constant or near constant redirection (from leaving, from others, etc).  0   Intrusive or disruptive behaviors 0   TOTAL 3

## 2023-12-27 NOTE — PLAN OF CARE
"Individual Therapy Note      Date of Service: December 27, 2023    Patient: Connor goes by \"Connor,\" uses he/him pronouns    Individuals Present: [facility]    Session start: 1100  Session end: 1145  Session duration in minutes: 45    Patient Active Problem List   Diagnosis    Depression, unspecified depression type    Psychosis (H)    Schizoaffective disorder, bipolar type (H)    Autism         Modality Used:Person Centered, Motivational Interviewing, and Solution Focused    Goals: Safety Plan       Mental Status Exam:   Attitude: cooperative and guarded  Eye Contact: good  Mood: good  Affect: mood congruent, intensity is normal, and guarded  Speech: clear, coherent  Psychomotor Behavior: no evidence of tardive dyskinesia, dystonia, or tics  Thought Process:  logical, linear, and goal oriented  Associations: no loose associations  Thought Content: no evidence of suicidal ideation or homicidal ideation, no evidence of psychotic thought, no auditory hallucinations present, and no visual hallucinations present  Insight: fair  Judgement: fair  Attention Span and Concentration: fair    Pt progress: Pt was engaged in the safety planning process. Pt showed all of the form he has in a folder that he received upon admission and held the folder across his body throughout the session. Pt repeated several times that he feels much better and is ready to leave. Pt was avoidant to discuss the warning signs that his mental health is declining, assumably b/c he worries it will impact his ability to be discharged. Pt was distractible throughout the conversation and shared info that he was able to take care of himself and his responsibilities, such as cleaning, self-cares, shopping, checking in with his , cooking, etc. Pt engaged in the process, but didn't seem to understand the purpose of the safety plan, even after multiple explanations. At the end of the session I reminded pt that he can use " the form to remind him of coping skills and people/place he can call if he struggles.     Treatment Objective(s) Addressed:   The focus of this session was on identifying and practicing coping strategies, safety planning, and identifying an appropriate aftercare plan     Progress Towards Goals and Assessment of Patient:   Patient is making progress towards treatment goals as evidenced by improved mood, much more social with peers and staff,.       Therapeutic Intervention(s):   Provided active listening, unconditional positive regard, and validation. Engaged in safety planning.  Engaged in guided discovery, explored patient's perspectives and helped expand them through socratic dialogue.    Plan/next step: Discharge

## 2023-12-27 NOTE — PLAN OF CARE
"He is active in [place] [facility] [place] in the shift.  He is slightly irritable in discussions, but does maintain eye contact and able to maintain conversations.  He denies pain/discomfort and cold, flu like symptoms when asked.  States, \" I'm feeling good,\" in talks.  He denies problematic anxiety, depression, suicidal, and homicidal ideations when asked.  He denies hallucinations when asked.  He agrees to come to staff for concerns and said that he'll go back to his apartment or go to an IRTS.  He continues to state paranoia statements by stating if he goes back to his apartment that, \" I'll put protections in place to protect my self.\"  He states that he talked to his  and bank about these concerns.  Plan is to discharge back to his apartment this week.  He is in agreement with plan to discharge back to his apartment and SW notified of findings.  Later, he is requesting to discharge today.  He was irritable when speaking with SW and writer.  Writer talked with the MD and MD approved for him to discharge today.  He verbalizes understanding of his discharge instructions and verbalized he has all of his belongings with him upon leaving the unit.  Discharge medication scripts sent to his discharge pharmacy.  He left the unit at 1345 with PA to be picked up by cab back to his apartment.       "

## 2023-12-27 NOTE — PLAN OF CARE
Care Coordinator Note(s):    Care Request(s):   Psychiatry  - Existing  Preferences: 3 weeks, in person  Notes: TRINH Ronquillo    PCP  - Existing  Preferences: 2 weeks, in person  Notes: Yony Ro MD    Care Outcome(s):    Appointment type: Psychiatry   Date/time: Wednesday January 17th, 2024 @ 11:30 AM In person  Provider:  TRINH Ronquillo  Address: [facility] [address] [place] [address]  Phone: [phone]  Fax: [phone]       Appointment type: Primary Care  Date/time: Wednesday January 10th, 2024 @ 9:30 AM Virtual   Provider: Yony Ro MD  Address: [facility] [address] [place] [address]  Phone: [phone]  Fax: [phone]  Note:  You will receive a [facility] visit link to attend appointment via telehealth. Provider has no in person availability within requested timeframe.        Further Actions:  None.    -Emily Ojeda  Adult Behavioral Health Care Coordinator

## 2023-12-27 NOTE — PROVIDER NOTIFICATION
12/27/23 0600   Sleep/Rest   Night Time # Hours 5.75 hours     Pt had a quiet night with no behavioral or safety concerns. Pt was observed sleeping almost the entire night, no acute events noted or reported. No prn medications requested.

## 2023-12-27 NOTE — PLAN OF CARE
"Adult Inpatient Safety Plan:     [facility]           [address]                                Connor Soares    SAFETY PLAN:  Step 1: Warning signs / cues (Thoughts, images, mood, situation, behavior) that a crisis may be developing:  Thoughts:   Images:   Thinking Processes:   Mood:   Behaviors: not taking care of myself, not sleeping enough, and not checking in enough with   Situations:  not taking meds      Step 2: Coping strategies - Things I can do to take my mind off of my problems without contacting another person (relaxation technique, physical activity):  Distress Tolerance Strategies:  relaxation activities: mindfulness, arts and crafts, listen to positive and upbeat music, read a book, journaling,   Physical Activities: go for a walk  Focus on helpful thoughts:  \"This is temporary\", \"I will get through this\", \"It always passes\", and self-compassion statements: \"Making positive decisions will get positive results\" \"Making negative decision can get negative results\"    Step 3: Remind myself of people and things that are important to me and worth living for:    Music  Creativity, cooking  [place] life    Step 4: When I am in crisis, I can ask these people to help me use my safety plan:   Name:   Phone:    Name:     Phone:    Name:     Phone:     Step 5: Making the environment safe:   I will remove alcohol and drugs, secure my medications, dispose of old medications, remove access to firearms, remove things I could use to hurt myself, and identify supportive people  Other ways to make my environment safe: positive affirmations, being around safe people    Step 6: Professionals or agencies I can contact during a crisis:  Crisis Intervention: 200.515.6117 or 064-893-7868 (TTY: 643.143.8881).  Call anytime for help.  National [place] on Mental Illness (www.[place].lc.org): 939.535.1400 or 483-866-1790.  Suicide Awareness Voices of Education " "(SAVE) (www.save.org): 595-869-RNRY (7283)  National Suicide Prevention Line (www.mentalhealthmn.org): 450-527-KIBR (9974)  Mental Health Consumer/Survivor Network of [place] (www.mhcsn.net): 244.633.8951 or 464-152-0055  Mental Health Association of [place] (www.mentalhealth.org): 729.116.2701 or 991-478-4374  Self- Management and Recovery Training., SMART-- Toll free: 774.877.7937  www.[employer] Mobile  [facility] Crisis Response - Adult 200 655-8275  Text 4 Life: txt \"LIFE\" to 53267 for immediate support and crisis intervention  Crisis text line: Text \"[place]\" to 523292. Free, confidential, 24/7.  Crisis Intervention: 390.767.6492 or 951-551-9322. Call anytime for help.   [facility] Crisis Team - Child: 894.634.8187      If unable to maintain safety despite working your plan, call 911 or go to my nearest emergency department.       Patient helped develop this safety plan and agreed to use it when needed.  Pt has been given a copy of this plan.        Today s date:  December 27, 2023  Completed by Clinician Name/ Credentials:  LUANNE Roldan        Adapted from Safety Plan Template 2008 Brianna Gray and Sha Patiño is reprinted with the express permission of the authors.  No portion of the Safety Plan Template may be reproduced without the express, written permission.  You can contact the authors at [email or web address] or [email or web address].    "

## 2023-12-27 NOTE — PLAN OF CARE
Problem: Adult Inpatient Plan of Care  Goal: Optimal Comfort and Wellbeing  Intervention: Provide Person-Centered Care  Recent Flowsheet Documentation  Taken 12/26/2023 1800 by Héctor Hou RN  Trust Relationship/Rapport:   care explained   choices provided   emotional support provided   empathic listening provided   questions answered   questions encouraged   reassurance provided   thoughts/feelings acknowledged     Problem: Adult Behavioral Health Plan of Care  Goal: Plan of Care Review  Recent Flowsheet Documentation  Taken 12/26/2023 1800 by Héctor Hou RN  Patient Agreement with Plan of Care: agrees     Problem: Adult Behavioral Health Plan of Care  Goal: Adheres to Safety Considerations for Self and Others  Outcome: Progressing  Flowsheets (Taken 12/26/2023 2140)  Adheres to Safety Considerations for Self and Others: making progress toward outcome  Intervention: Develop and Maintain Individualized Safety Plan  Recent Flowsheet Documentation  Taken 12/26/2023 1800 by Héctor Hou RN  Safety Measures:   safety rounds completed   suicide check-in completed   /81 (BP Location: Left arm, Patient Position: Sitting, Cuff Size: Adult Large)   Pulse 95   Temp 98.6  F (37  C) (Oral)   Resp 18   Wt 134.3 kg (296 lb 1.6 oz)   SpO2 98%   BMI 40.16 kg/m    Iso:  No active isolations  Diet: Regular Diet Adult  Mental Status:   Alert and oriented x 4.     RN Assessment:  SI/Self harm:  Denied  Aggression/agitation/HI:  Denied  AVH:  Denied  Sleep:No issues   PRN Med: No PRNs administered this shift  Medication : Compliant  Physical Complaints/Issues: No issues  I & O: eating and drinking well  LBM: 12/26/23  ADLs: independent  Visits: 0  Vitals:  WNL  COVID 19 Assessment:    Milieu Participation:  Behavior:Calm and cooperative  Pt is alert and oriented. Denied having pain and discomfort. Endorsed anxiety in the scale of 6/10. ATARAX 25 mg was administered and was effective. All pt needs were met.  Continue with current care plan.

## 2023-12-27 NOTE — PROGRESS NOTES
"[facility], [place]   Psychiatric Progress Note  Hospital Day: 17          Interim History:   Patient was admitted due to suicidal ideation and paranoia.      Patient seen and chart reviewed. Case discussed in multi-disciplinary treatment team      According to Nursing report:  Patient is visible in the lounge and more interactions with staff and peers. He is more organized but can still be scattered. He still has some paranoia.        According to Nursing notes from yesterday:  Connor is alert and well oriented this shift.   He has been visible in the milieu. Social with select peers. Has been making phone calls thru the day working on discharge details and to make sure his vehicle is secure at his apartment.   He reports that his sleep was improved last night and had no c/o nightmares. He describes his mood as \"good\" today and has a full range affect. Rates anxiety 4/10, depression 4/10. Denies suicidal ideation. Reports his thoughts are \"really clear\" this morning and denies paranoid or delusional thought content. Denies hallucinations. No overt psychotic sx noted.   Medication compliant. Pt reports that his medication regimen has been helpful.   He had signed a waiver for commitment, so did not need to attend hearing today.    Pt had a quiet night with no behavioral or safety concerns. Pt was observed sleeping almost the entire night, no acute events noted or reported. No prn medications requested.          According to  :  Legal Status:  Patient is on a court hold in [facility] (as of 12/20/2023).   Case No. [id number]  Petition for MI Commitment and [facility] initiated in [facility] on 12/18/2023.   Plan is for discharge to his apartment        On interview today:Patient denies suicidal or homicidal thoughts and denies hallucinations. Patient is not revealing delusions on interview. Patient denies side effects to medications. He reports that depression is " under good control and improved since admission. He still is somewhat scattered. He is agreeable to plan for IRTS and is hoping for placement soon, but is requesting that we re-consider letting him discharge to home.      ROS:Patient has no other physical complaints today.        Vital signs:  Temp: 98.6  F (37  C) Temp src: Oral BP: 112/81 Pulse: 95   Resp: 18 SpO2: 98 % O2 Device: None (Room air)     Weight: 134.3 kg (296 lb 1.6 oz)  Estimated body mass index is 40.16 kg/m  as calculated from the following:    Height as of 4/30/23: 1.829 m (6').    Weight as of this encounter: 134.3 kg (296 lb 1.6 oz).       MSE:  Appearance: awake, alert and  untidy, wearing multiple layers of shirts  Attitude:   continues to be a bit guarded, mostly cooperative   Eye Contact:  fair  Mood:  euthymic  Affect:  mood congruent   Speech:  clear, coherent and normal prosody  Language: fluent and intact in English  Psychomotor, Gait, Musculoskeletal:  no evidence of tardive dyskinesia, dystonia, or tics  Thought Process:  more organized  Associations:  no loose associations  Thought Content:   denies current SI or HI, reports AH, that are negative, appears internally preoccupied at times; paranoid, delusional particularly around discussions re: family; denies VH  Insight:  limited  Judgement:  limited  Oriented to:   person, place, month, year  Attention Span and Concentration:  fair  Recent and Remote Memory:  limited  Fund of Knowledge:  appropriate    Minimal change in mental status in the past 24 hours             Medications:       busPIRone  5 mg Oral BID     lurasidone  40 mg Oral Daily with lunch     polyethylene glycol  17 g Oral Daily     QUEtiapine  300 mg Oral At Bedtime     senna-docusate  2 tablet Oral BID     cholecalciferol  50 mcg Oral Daily          Allergies:   No Known Allergies       Labs:     No results found for this or any previous visit (from the past 48 hour(s)).             Precautions:     Behavioral Orders    Procedures     Code 1 - Restrict to Unit     Routine Programming     As clinically indicated     Status 15     Every 15 minutes.     Suicide precautions     Patients on Suicide Precautions should have a Combination Diet ordered that includes a Diet selection(s) AND a Behavioral Tray selection for Safe Tray - with utensils, or Safe Tray - NO utensils            Diagnoses:    Schizoaffective disorder, bipolar type (H)      Schizoaffective disorder, bipolar type (H)  ARETHA  ASD  ADHD per chart  Cannabis use per chart  Alcohol use per chart  Constipation, unspecified constipation type  Hx of Vit D deficiency   Hypokalemia, improved  Leukocytosis, improved          Assessment & Plan:   Assessment and hospital summary:  33 y M with history of schizoaffective disorder, anxiety, autism and substance use who presented to the ED via EMS for SI and psychosis in context of medication non-adherence. Medically cleared in ED, was initially agreeable to remain in ED then requested to leave, placed on 72HH, started on lurasidone to target mood and psychosis and pt requested to try a new medication. Admitted to 10N under 72HH. UDS negative, other admission labs ordered. PTA quetiapine continued to furhter target psychosis. Pt also on buspirone for anxiety and some Miralax that was started in ED for constipation, he also received enema in ED per his request. Has been on senna/colace PTA, this was resumed and miralax moved to PRN. Pt also requested to be on vitamin D, has not had recent level, this was ordered, standard daily dosing ordered at this time and will determine if patient again requires high dose replacement as he has in the past. On 12/11 patient agreeable to signing in as voluntary patient. Also reports needing a new place to discharge to as not feeling safe in previous residence, CTC to explore. Continues to have some disorganization along with paranoia and reports of AH, will continue to adjust medications for  stabilization as indicated/tolerated. Pt signed 12 hour intent to discharge on 12/16, unable to identify any safe plan, ongoing disorganization, placed on 72HH. Petition for MH commitment and [place] submitted 12/18, pt placed on court hold.      Psychiatric treatment/interventions:  Medications:   -continue PTA quetiapine 300mg at bedtime for psychosis and mood  -continue lurasidone at 40mg daily, moved to lunch time due to need to take with food; medication started in ED for mood and psychosis, had planned to optimize though pts intake appears inconsistent, may need to consider alternative if not frequently taking with enough food  -awaiting outcome of court proceedings/[place] before adjusting neuroleptics further   -continue Vit D3 50mcg daily, started 12/11 per pts request, Vit D level 33  -continue buspirone 5mg BID for anxiety     -PRN hydroxyzine 25mg every 4 hours anxiety  -PRN lorazapam 1mg every 4 hours PO or IM for agitation   -PRN olanzapine 5-10mg PO or 10mg IM TID for agitation, first line  -PRN trazodone 50mg at bedtime for sleep     -pt agreed to sign in voluntary 12/11, discontinued 72HH, pt signed intent to discharge on 12/16, placed on 72HH, petition for MH commitment with [place] filed 12/18 with Julio Stinson, requested [place] meds: Haldol, Olanzapine, Quetiapine, Lurasidone, Paliperidone; pt placed on court hold 12/20      The risks, benefits, alternatives and side effects have been discussed and are understood by the patient.     Laboratory/Imaging: no new labs ordered     Patient will be treated in therapeutic milieu with appropriate individual and group therapies as described.     Medical treatment/interventions:  Medical concerns: Pt requests daily Vit D, has hx of Vit D deficiency requiring high dose replacement, ordered 50mcg daily and Vit D level as above. Pt also has hx of constipation, was on scheduled Senna/Colace PTA, will resume and move miralax to PRN and continue to monitor. Pt also  with hypokalemia in ED and leukoctyosis, will repeat labs in AM and address as indicated. Repeat labs WNL on 12/12, Vit D level WNL will continue daily supplementation as above. Pt in consistent historian re: bowel movements, continuing to monitor closely. Reported having last BM 12/22.      Disposition Plan   Reason for ongoing admission: poses an imminent risk to self and is unable to care for self due to severe psychosis or robert  Discharge location:  TBD, pt reports not feeling safe to return to previous residence, may consider IRTS  Discharge Medications: not ordered  Follow-up Appointments: not scheduled  Legal Status:  Admitted on 72HH,signed in voluntary on 12/11, placed on 72HH on 12/16 after requesting to discharge, petition for MH commitment and [place] filed 12/18 with Julio Stinson ; pt currently on court hold     No further change in treatment plan  Patient seen, chart reviewed, case reviewed with  and with nursing.   Case reviewed in multi-disciplinary treatment team.      Andrew Rodriguez MD

## 2023-12-27 NOTE — DISCHARGE SUMMARY
[facility], [place]   Psychiatric Discharge Summary          Hospital Course:     Assessment and hospital summary:  33 y M with history of schizoaffective disorder, anxiety, autism and substance use who presented to the ED via EMS for SI and psychosis in context of medication non-adherence. Medically cleared in ED, was initially agreeable to remain in ED then requested to leave, placed on 72HH, started on lurasidone to target mood and psychosis and pt requested to try a new medication. Admitted to 10N under 72HH. UDS negative, other admission labs ordered. PTA quetiapine continued to furhter target psychosis. Pt also on buspirone for anxiety and some Miralax that was started in ED for constipation, he also received enema in ED per his request. Has been on senna/colace PTA, this was resumed and miralax moved to PRN. Pt also requested to be on vitamin D, has not had recent level, this was ordered, standard daily dosing ordered at this time and will determine if patient again requires high dose replacement as he has in the past. On 12/11 patient agreeable to signing in as voluntary patient. Also reports needing a new place to discharge to as not feeling safe in previous residence, CTC to explore. Continues to have some disorganization along with paranoia and reports of AH, will continue to adjust medications for stabilization as indicated/tolerated. Pt signed 12 hour intent to discharge on 12/16, unable to identify any safe plan, ongoing disorganization, placed on 72HH. Petition for MH commitment and [place] submitted 12/18, pt placed on court hold.      Patient was eventually committed. His medications were adjusted as follows    busPIRone  5 mg Oral BID     lurasidone  40 mg Oral Daily with lunch     polyethylene glycol  17 g Oral Daily     QUEtiapine  300 mg Oral At Bedtime     senna-docusate  2 tablet Oral BID     cholecalciferol  50 mcg Oral Daily   He responded well to this and the  "structure of the milieu. His psychosis resolved completely.    By day of discharge patient was calm and cooperative with no evidence of psychosis and no suicidal or homicidal thoughts.        Interim History:   Patient was admitted due to suicidal ideation and paranoia.  Patient seen and chart reviewed. Case discussed in multi-disciplinary treatment team    According to Nursing report:  Patient is visible in the lounge and more interactions with staff and peers. He is more organized but can still be scattered. He still has some paranoia.    According to Nursing notes from yesterday:  Connor is alert and well oriented this shift.   He has been visible in the milieu. Social with select peers. Has been making phone calls thru the day working on discharge details and to make sure his vehicle is secure at his apartment.   He reports that his sleep was improved last night and had no c/o nightmares. He describes his mood as \"good\" today and has a full range affect. Rates anxiety 4/10, depression 4/10. Denies suicidal ideation. Reports his thoughts are \"really clear\" this morning and denies paranoid or delusional thought content. Denies hallucinations. No overt psychotic sx noted.   Medication compliant. Pt reports that his medication regimen has been helpful.   He had signed a waiver for commitment, so did not need to attend hearing today.    Pt had a quiet night with no behavioral or safety concerns. Pt was observed sleeping almost the entire night, no acute events noted or reported. No prn medications requested.        On interview today:Patient denies suicidal or homicidal thoughts and denies hallucinations. Patient is not revealing delusions on interview. Patient denies side effects to medications. He reports that depression is under good control and improved since admission. He still is somewhat scattered. He is agreeable to plan for IRTS and is hoping for placement soon, but is requesting that we re-consider letting " him discharge to home.      ROS:Patient has no other physical complaints today.        Vital signs:  Temp: 97.9  F (36.6  C) Temp src: Oral BP: 112/81 Pulse: 95   Resp: 18 SpO2: 98 % O2 Device: None (Room air)     Weight: 134.3 kg (296 lb 1.6 oz)  Estimated body mass index is 40.16 kg/m  as calculated from the following:    Height as of 4/30/23: 1.829 m (6').    Weight as of this encounter: 134.3 kg (296 lb 1.6 oz).       MSE:  Appearance: awake, alert and  untidy, wearing multiple layers of shirts  Attitude:   continues to be a bit guarded, mostly cooperative   Eye Contact:  fair  Mood:  euthymic  Affect:  mood congruent   Speech:  clear, coherent and normal prosody  Language: fluent and intact in English  Psychomotor, Gait, Musculoskeletal:  no evidence of tardive dyskinesia, dystonia, or tics  Thought Process:  more organized  Associations:  no loose associations  Thought Content:   denies current SI or HI, reports AH, that are negative, appears internally preoccupied at times; paranoid, delusional particularly around discussions re: family; denies VH  Insight:  limited  Judgement:  limited  Oriented to:   person, place, month, year  Attention Span and Concentration:  fair  Recent and Remote Memory:  limited  Fund of Knowledge:  appropriate    Minimal change in mental status in the past 24 hours             Medications:       busPIRone  5 mg Oral BID     lurasidone  40 mg Oral Daily with lunch     polyethylene glycol  17 g Oral Daily     QUEtiapine  300 mg Oral At Bedtime     senna-docusate  2 tablet Oral BID     cholecalciferol  50 mcg Oral Daily          Allergies:   No Known Allergies       Labs:     No results found for this or any previous visit (from the past 48 hour(s)).             Precautions:     Behavioral Orders   Procedures     Code 1 - Restrict to Unit     Routine Programming     As clinically indicated     Status 15     Every 15 minutes.     Suicide precautions     Patients on Suicide Precautions  should have a Combination Diet ordered that includes a Diet selection(s) AND a Behavioral Tray selection for Safe Tray - with utensils, or Safe Tray - NO utensils            Diagnoses:    Schizoaffective disorder, bipolar type (H)      Schizoaffective disorder, bipolar type (H)  ARETHA  ASD  ADHD per chart  Cannabis use per chart  Alcohol use per chart  Constipation, unspecified constipation type  Hx of Vit D deficiency   Hypokalemia, improved  Leukocytosis, improved          Assessment & Plan:   Discharge to home    Psychiatric treatment/interventions:  Medications:   -continue PTA quetiapine 300mg at bedtime for psychosis and mood  -continue lurasidone at 40mg daily, moved to lunch time due to need to take with food; medication started in ED for mood and psychosis, had planned to optimize though pts intake appears inconsistent, may need to consider alternative if not frequently taking with enough food  -awaiting outcome of court proceedings/[place] before adjusting neuroleptics further   -continue Vit D3 50mcg daily, started 12/11 per pts request, Vit D level 33  -continue buspirone 5mg BID for anxiety     -PRN hydroxyzine 25mg every 4 hours anxiety  -PRN lorazapam 1mg every 4 hours PO or IM for agitation   -PRN olanzapine 5-10mg PO or 10mg IM TID for agitation, first line  -PRN trazodone 50mg at bedtime for sleep     -pt agreed to sign in voluntary 12/11, discontinued 72HH, pt signed intent to discharge on 12/16, placed on 72HH, petition for MH commitment with [place] filed 12/18 with Julio Stinson, requested [place] meds: Haldol, Olanzapine, Quetiapine, Lurasidone, Paliperidone; pt placed on court hold 12/20 and was subsequently committed.      The risks, benefits, alternatives and side effects have been discussed and are understood by the patient.     Laboratory/Imaging: no new labs ordered     Patient will be treated in therapeutic milieu with appropriate individual and group therapies as described.     Medical  treatment/interventions:  Medical concerns: Pt requests daily Vit D, has hx of Vit D deficiency requiring high dose replacement, ordered 50mcg daily and Vit D level as above. Pt also has hx of constipation, was on scheduled Senna/Colace PTA, will resume and move miralax to PRN and continue to monitor. Pt also with hypokalemia in ED and leukoctyosis, will repeat labs in AM and address as indicated. Repeat labs WNL on 12/12, Vit D level WNL will continue daily supplementation as above. Pt in consistent historian re: bowel movements, continuing to monitor closely. Reported having last BM 12/22.      No further change in treatment plan  Patient seen, chart reviewed, case reviewed with  and with nursing.   Case reviewed in multi-disciplinary treatment team.    More than 40 minutes spent on this visit including patient interview, coordination of care with staff, reviewing medical record, psychoeducation, providing supportive therapy regarding coping with chronic mental illness, entering orders and preparing documentation for the visit    Andrew Rodriguez MD

## 2024-01-01 ENCOUNTER — TRANSFERRED RECORDS (OUTPATIENT)
Dept: HEALTH INFORMATION MANAGEMENT | Facility: CLINIC | Age: 34
End: 2024-01-01
Payer: MEDICARE

## 2024-01-01 LAB
ALT SERPL-CCNC: 14 U/L (ref 0–41)
AST SERPL-CCNC: 11 U/L (ref 0–37)
CREATININE (EXTERNAL): 0.7 MG/DL (ref 0.7–1.2)
GFR ESTIMATED (EXTERNAL): >60 ML/MIN/1.73M2
GLUCOSE (EXTERNAL): 103 MG/DL (ref 64–109)
PHQ9 SCORE: 22
POTASSIUM (EXTERNAL): 3.8 MMOL/L (ref 3.5–4.9)
TSH SERPL-ACNC: 3.5 UIU/ML (ref 0.27–4.2)

## 2024-01-02 ENCOUNTER — TELEPHONE (OUTPATIENT)
Dept: BEHAVIORAL HEALTH | Facility: CLINIC | Age: 34
End: 2024-01-02
Payer: MEDICARE

## 2024-01-02 ENCOUNTER — TELEPHONE (OUTPATIENT)
Dept: BEHAVIORAL HEALTH | Facility: HOSPITAL | Age: 34
End: 2024-01-02

## 2024-01-02 NOTE — TELEPHONE ENCOUNTER
R:    1:52p Received call from Lemuel Partida informing pt will be admitting from Outside Facility,  [facility] [place], to /[place]. Requesting Indicia completion.     2:00p Indicia completed.

## 2024-01-03 ENCOUNTER — HOSPITAL ENCOUNTER (INPATIENT)
Facility: HOSPITAL | Age: 34
LOS: 23 days | Discharge: PSYCHIATRIC OR SUBSTANCE USE FACILITY | DRG: 885 | End: 2024-01-26
Attending: PSYCHIATRY & NEUROLOGY | Admitting: STUDENT IN AN ORGANIZED HEALTH CARE EDUCATION/TRAINING PROGRAM
Payer: MEDICARE

## 2024-01-03 DIAGNOSIS — G47.00 INSOMNIA, UNSPECIFIED TYPE: ICD-10-CM

## 2024-01-03 DIAGNOSIS — R52 PAIN: ICD-10-CM

## 2024-01-03 DIAGNOSIS — K59.00 CONSTIPATION, UNSPECIFIED CONSTIPATION TYPE: ICD-10-CM

## 2024-01-03 DIAGNOSIS — F25.0 SCHIZOAFFECTIVE DISORDER, BIPOLAR TYPE (H): ICD-10-CM

## 2024-01-03 DIAGNOSIS — F33.3 SEVERE EPISODE OF RECURRENT MAJOR DEPRESSIVE DISORDER, WITH PSYCHOTIC FEATURES (H): ICD-10-CM

## 2024-01-03 DIAGNOSIS — F41.9 ANXIETY: ICD-10-CM

## 2024-01-03 DIAGNOSIS — F32.A DEPRESSION, UNSPECIFIED DEPRESSION TYPE: ICD-10-CM

## 2024-01-03 DIAGNOSIS — Z72.0 TOBACCO USE: Primary | ICD-10-CM

## 2024-01-03 PROBLEM — F29 PSYCHOSIS (H): Status: ACTIVE | Noted: 2023-04-30

## 2024-01-03 PROCEDURE — 250N000013 HC RX MED GY IP 250 OP 250 PS 637: Performed by: NURSE PRACTITIONER

## 2024-01-03 PROCEDURE — 99223 1ST HOSP IP/OBS HIGH 75: CPT | Mod: AI

## 2024-01-03 PROCEDURE — 124N000001 HC R&B MH

## 2024-01-03 PROCEDURE — 250N000013 HC RX MED GY IP 250 OP 250 PS 637

## 2024-01-03 PROCEDURE — 99221 1ST HOSP IP/OBS SF/LOW 40: CPT | Performed by: NURSE PRACTITIONER

## 2024-01-03 RX ORDER — MAGNESIUM HYDROXIDE/ALUMINUM HYDROXICE/SIMETHICONE 120; 1200; 1200 MG/30ML; MG/30ML; MG/30ML
30 SUSPENSION ORAL EVERY 4 HOURS PRN
Status: DISCONTINUED | OUTPATIENT
Start: 2024-01-03 | End: 2024-01-26 | Stop reason: HOSPADM

## 2024-01-03 RX ORDER — ALBUTEROL SULFATE 90 UG/1
2 AEROSOL, METERED RESPIRATORY (INHALATION) 4 TIMES DAILY PRN
Status: DISCONTINUED | OUTPATIENT
Start: 2024-01-03 | End: 2024-01-26 | Stop reason: HOSPADM

## 2024-01-03 RX ORDER — VITAMIN B COMPLEX
50 TABLET ORAL DAILY
Status: DISCONTINUED | OUTPATIENT
Start: 2024-01-03 | End: 2024-01-26 | Stop reason: HOSPADM

## 2024-01-03 RX ORDER — POLYETHYLENE GLYCOL 3350 17 G/17G
17 POWDER, FOR SOLUTION ORAL DAILY
Status: DISCONTINUED | OUTPATIENT
Start: 2024-01-03 | End: 2024-01-26 | Stop reason: HOSPADM

## 2024-01-03 RX ORDER — AMOXICILLIN 250 MG
2 CAPSULE ORAL 2 TIMES DAILY
Status: DISCONTINUED | OUTPATIENT
Start: 2024-01-03 | End: 2024-01-26 | Stop reason: HOSPADM

## 2024-01-03 RX ORDER — LURASIDONE HYDROCHLORIDE 40 MG/1
40 TABLET, FILM COATED ORAL
Status: DISCONTINUED | OUTPATIENT
Start: 2024-01-04 | End: 2024-01-07

## 2024-01-03 RX ORDER — HYDROXYZINE HYDROCHLORIDE 25 MG/1
25 TABLET, FILM COATED ORAL EVERY 4 HOURS PRN
Status: DISCONTINUED | OUTPATIENT
Start: 2024-01-03 | End: 2024-01-26 | Stop reason: HOSPADM

## 2024-01-03 RX ORDER — OLANZAPINE 10 MG/2ML
10 INJECTION, POWDER, FOR SOLUTION INTRAMUSCULAR 3 TIMES DAILY PRN
Status: DISCONTINUED | OUTPATIENT
Start: 2024-01-03 | End: 2024-01-26 | Stop reason: HOSPADM

## 2024-01-03 RX ORDER — OLANZAPINE 10 MG/1
10 TABLET ORAL 3 TIMES DAILY PRN
Status: DISCONTINUED | OUTPATIENT
Start: 2024-01-03 | End: 2024-01-26 | Stop reason: HOSPADM

## 2024-01-03 RX ORDER — BUSPIRONE HYDROCHLORIDE 5 MG/1
5 TABLET ORAL 2 TIMES DAILY
Status: DISCONTINUED | OUTPATIENT
Start: 2024-01-03 | End: 2024-01-09

## 2024-01-03 RX ORDER — ACETAMINOPHEN 325 MG/1
650 TABLET ORAL EVERY 4 HOURS PRN
Status: DISCONTINUED | OUTPATIENT
Start: 2024-01-03 | End: 2024-01-26 | Stop reason: HOSPADM

## 2024-01-03 RX ORDER — LANOLIN ALCOHOL/MO/W.PET/CERES
3 CREAM (GRAM) TOPICAL
Status: DISCONTINUED | OUTPATIENT
Start: 2024-01-03 | End: 2024-01-26 | Stop reason: HOSPADM

## 2024-01-03 RX ADMIN — QUETIAPINE 300 MG: 100 TABLET ORAL at 20:19

## 2024-01-03 RX ADMIN — SENNOSIDES AND DOCUSATE SODIUM 2 TABLET: 8.6; 5 TABLET ORAL at 20:19

## 2024-01-03 RX ADMIN — Medication 50 MCG: at 16:03

## 2024-01-03 RX ADMIN — POLYETHYLENE GLYCOL 3350 17 G: 17 POWDER, FOR SOLUTION ORAL at 16:03

## 2024-01-03 RX ADMIN — BUSPIRONE HYDROCHLORIDE 5 MG: 5 TABLET ORAL at 20:19

## 2024-01-03 RX ADMIN — HYDROXYZINE HYDROCHLORIDE 25 MG: 25 TABLET, FILM COATED ORAL at 12:38

## 2024-01-03 RX ADMIN — HYDROXYZINE HYDROCHLORIDE 25 MG: 25 TABLET, FILM COATED ORAL at 20:23

## 2024-01-03 ASSESSMENT — ACTIVITIES OF DAILY LIVING (ADL)
ADLS_ACUITY_SCORE: 31
LAUNDRY: UNABLE TO COMPLETE
DRESS: INDEPENDENT
ORAL_HYGIENE: INDEPENDENT
ADLS_ACUITY_SCORE: 31
ADLS_ACUITY_SCORE: 31
HYGIENE/GROOMING: INDEPENDENT
ADLS_ACUITY_SCORE: 31

## 2024-01-03 NOTE — MEDICATION SCRIBE - ADMISSION MEDICATION HISTORY
Medication Scribe Admission Medication History    Admission medication history is complete. The information provided in this note is only as accurate as the sources available at the time of the update.    Information Source(s): Patient, Hospital records, and CareEverywhere/SureScripts via in-person    Pertinent Information:   Patient manages his own medications and is a good historian.     Changes made to PTA medication list:  Added: None  Deleted: high dose vit D- pt reports therapy completed, now on 2000 units only  Changed: None    Medication Affordability: did not assess     Allergies reviewed with patient and updates made in EHR: yes    Medication History Completed By: Fani Ferraro 1/3/2024 3:21 PM    PTA Med List   Medication Sig Last Dose    acetaminophen (TYLENOL) 325 MG tablet Take 650 mg by mouth daily as needed (arm pain) Past Week    albuterol (PROAIR HFA/PROVENTIL HFA/VENTOLIN HFA) 108 (90 Base) MCG/ACT inhaler Inhale 2 puffs into the lungs 4 times daily as needed for shortness of breath or wheezing Unknown    busPIRone (BUSPAR) 5 MG tablet Take 1 tablet (5 mg) by mouth 2 times daily 1/3/2024 at AM    hydrOXYzine HCl (ATARAX) 25 MG tablet Take 1 tablet (25 mg) by mouth every 6 hours as needed for anxiety 1/2/2024    lurasidone (LATUDA) 40 MG TABS tablet Take 1 tablet (40 mg) by mouth daily (with lunch) 1/2/2024 at 1200    polyethylene glycol (MIRALAX) 17 GM/Dose powder Take 17 g by mouth daily (Patient taking differently: Take 17 g by mouth daily 1-2 times daily) 1/2/2024    QUEtiapine (SEROQUEL) 300 MG tablet Take 1 tablet (300 mg) by mouth at bedtime 1/2/2024 at HS    senna-docusate (SENOKOT-S/PERICOLACE) 8.6-50 MG tablet Take 2 tablets by mouth 2 times daily 1/3/2024 at AM    Vitamin D3 (CHOLECALCIFEROL) 25 mcg (1000 units) tablet Take 2 tablets (50 mcg) by mouth daily 1/2/2024 at AM
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
## 2024-01-03 NOTE — PROGRESS NOTES
01/03/24 1339   Patient Belongings   Did you bring any home meds/supplements to the hospital?  Yes   Disposition of meds  Sent to security/pharmacy per site process   Patient Belongings none   Patient Belongings Put in Hospital Secure Location (Security or Locker, etc.) cash/credit card;cell phone/electronics;clothing;wallet;shoes;other (see comments)   Belongings Search Yes   Clothing Search Yes   Second Staff [facility]   Comment Rios Jacket, 2 pair white socks, 1 pair black socks, pair grey pants, grey sweatshirt, black shoes, grey underwear. police report, misc reciepts and papers     List items sent to safe: cell phone in black case no scratches or cracks, [place] wallet, [place], [place] Card, [employer] reward card, bank card, Misc business cards, insurance card, $6.52- 6 1's, 2 quarters, 2 pennies.  All other belongings put in assigned cubby in belongings room.       I have reviewed my belongings list on admission and verify that it is correct.     Patient signature_______________________________    Second staff witness (if patient unable to sign) ______________________________       I have received all my belongings at discharge.    Patient signature________________________________    Pietro   1/3/2024  1:41 PM
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
## 2024-01-03 NOTE — DISCHARGE INSTRUCTIONS
Behavioral Discharge Planning and Instructions    Summary: Patient was admitted due to psychosis.     Main Diagnosis: psychosis    Health Care Follow-up:     Julito Richey  Targeted Case Management  [facility]  Direct: [phone]  Fax: [phone]  [email or web address]       [facility]  [address] [place] [address]  [phone]    [facility] [address]  [place] [address]  Phone: [phone]  Fax: [phone]  Toll Free: [phone]  Email:  [email or web address]  [email or web address]  LinkedIn  Here for good   #[email or web address]  Attend all scheduled appointments with your outpatient providers. Call at least 24 hours in advance if you need to reschedule an appointment to ensure continued access to your outpatient providers.     Major Treatments, Procedures and Findings:  You were provided with: a psychiatric assessment, assessed for medical stability, medication evaluation and/or management, group therapy, family therapy, individual therapy, CD evaluation/assessment, milieu management, and medical interventions    Symptoms to Report: feeling more aggressive, increased confusion, losing more sleep, mood getting worse, or thoughts of suicide    Early warning signs can include: increased depression or anxiety sleep disturbances increased thoughts or behaviors of suicide or self-harm  increased unusual thinking, such as paranoia or hearing voices        Resources:   Crisis Intervention: 252.743.4547 or 850-997-8937 (TTY: 183.779.5159).  Call anytime for help.  [place] on Mental Illness (www.[place].lc.org): 619.448.8042 or 457-217-4932.  [place] Association for Children's Mental Health (www.macmh.org): 329.603.2216.  Alcoholics Anonymous (www.alcoholics-anonymous.org): Check your phone book for your local chapter.  Suicide  "Awareness Voices of Education (SAVE) (www.save.org): 755-205-QHWB (3474)  National Suicide Prevention Line (www.mentalhealthmn.org): 373-126-JWTM (2339)  Mental Health Consumer/Survivor Network of [place] (www.mhcsn.net): 700.820.4840 or 009-966-0314  Mental Health Association of [place] (www.mentalhealth.org): 910.897.6471 or 066-522-0104  Self- Management and Recovery Training., SMART-- Toll free: 290.888.5436  www.oroeco  [facility] Crisis Response - Adult [phone]  Text 4 Life: txt \"LIFE\" to 45891 for immediate support and crisis intervention  Crisis text line: Text \"[place]\" to 581690. Free, confidential, 24/7.  Crisis Intervention: 131.894.2057 or 869-527-1571. Call anytime for help.   [facility] Crisis Response Team - Child: [phone]    General Medication Instructions:   See your medication sheet(s) for instructions.   Take all medicines as directed.  Make no changes unless your doctor suggests them.   Go to all your doctor visits.  Be sure to have all your required lab tests. This way, your medicines can be refilled on time.  Do not use any drugs not prescribed by your doctor.  Avoid alcohol.    Advance Directives:   Scanned document on file with [facility]? No scanned doc  Is document scanned? Pt states no documents  Honoring Choices Your Rights Handout: Informed and given  Was more information offered? Pt declined    The Treatment team has appreciated the opportunity to work with you. If you have any questions or concerns about your recent admission, you can contact the unit which can receive your call 24 hours a day, 7 days a week. They will be able to get in touch with a Provider if needed. The unit number is [phone] .    [facility] Area:  [facility], [facility] stabilization hospitals- [phone]  [facility] Crisis Line: 1-345.440.8381  Advocates For Family Peace: 435-4972  Sexual Assault Program [facility]: 753.820.1231 or 1-980.353.5099  Alisha Casillas" "Battered Women's Program: 8-495-392-0424 Ext: 279       Calls answered Mon-Fri-8:00 am--4:30 pm    [place]:  Advocates for Family Peace: 3-021-862-8716  [facility] - 3-767-059-7862  [facility] first call for help: 1-893-219-7971  [facility] Crisis Center:  (773) 546-2928    [place] Area:  Warm Line: 1-449.457.1577       Calls answered Tuesday--Saturday 4:00 pm--10:00 pm  [facility] Crisis Line - 450.357.8212  [facility] 362-053-1165    [place] Statewide:  [place] Crisis and Referral Services: 7-859-093-2406  National Suicide Prevention Lifeline: 4-526-678-TALK (4288)   - npv1pafo- Text \"Life\" to 56043  First Call for Help: 2-1-1  [facility] Helpline- 7-914-CNYU-HELP   Crisis Text Line: Text  [place]  to 302054   "

## 2024-01-03 NOTE — PROGRESS NOTES
KELLY called and left phone message notifying the patient's  know the patient is on the unit and asked if they plan on revoking him.     Julito Richey  Targeted Case Management  [facility]  Direct: [phone]  Fax: [phone]  [email or web address]       KELLY emailed the patient's  asking if they plan on revoking the patient.

## 2024-01-04 PROCEDURE — 124N000004

## 2024-01-04 PROCEDURE — 250N000013 HC RX MED GY IP 250 OP 250 PS 637

## 2024-01-04 PROCEDURE — 99232 SBSQ HOSP IP/OBS MODERATE 35: CPT

## 2024-01-04 PROCEDURE — 250N000013 HC RX MED GY IP 250 OP 250 PS 637: Performed by: NURSE PRACTITIONER

## 2024-01-04 RX ADMIN — QUETIAPINE 300 MG: 100 TABLET ORAL at 20:19

## 2024-01-04 RX ADMIN — SENNOSIDES AND DOCUSATE SODIUM 2 TABLET: 8.6; 5 TABLET ORAL at 20:19

## 2024-01-04 RX ADMIN — BUSPIRONE HYDROCHLORIDE 5 MG: 5 TABLET ORAL at 08:54

## 2024-01-04 RX ADMIN — BUSPIRONE HYDROCHLORIDE 5 MG: 5 TABLET ORAL at 20:19

## 2024-01-04 RX ADMIN — Medication 50 MCG: at 08:55

## 2024-01-04 RX ADMIN — POLYETHYLENE GLYCOL 3350 17 G: 17 POWDER, FOR SOLUTION ORAL at 08:55

## 2024-01-04 RX ADMIN — SENNOSIDES AND DOCUSATE SODIUM 2 TABLET: 8.6; 5 TABLET ORAL at 08:54

## 2024-01-04 RX ADMIN — HYDROXYZINE HYDROCHLORIDE 25 MG: 25 TABLET, FILM COATED ORAL at 17:52

## 2024-01-04 RX ADMIN — LURASIDONE HYDROCHLORIDE 40 MG: 40 TABLET, FILM COATED ORAL at 12:04

## 2024-01-04 ASSESSMENT — ACTIVITIES OF DAILY LIVING (ADL)
ADLS_ACUITY_SCORE: 31
ORAL_HYGIENE: INDEPENDENT
ADLS_ACUITY_SCORE: 31
ORAL_HYGIENE: INDEPENDENT
ADLS_ACUITY_SCORE: 31
ADLS_ACUITY_SCORE: 31
LAUNDRY: UNABLE TO COMPLETE
DRESS: INDEPENDENT
ADLS_ACUITY_SCORE: 31
DRESS: INDEPENDENT;SCRUBS (BEHAVIORAL HEALTH)
HYGIENE/GROOMING: INDEPENDENT
ADLS_ACUITY_SCORE: 31
LAUNDRY: UNABLE TO COMPLETE
ADLS_ACUITY_SCORE: 31
HYGIENE/GROOMING: INDEPENDENT
ADLS_ACUITY_SCORE: 31

## 2024-01-04 NOTE — PLAN OF CARE
MARCIO RAPHAEL RN  1/4/2024  7:46 AM  Face to face shift report received from MILENA Romo. Rounding completed, pt observed.     Pt denies SI, HI, hallucinations, anxiety, and depression.  Reports chest tenderness with touch-no visual injury.  Chest xray was done yesterday.  Cooperative with staff and compliant with meds.  Pleasant with peers. Attended groups.     [employer] is here serving pt papers.  House supervisor Vero is with PD and pt.      Problem: Adult Behavioral Health Plan of Care  Goal: Patient-Specific Goal (Individualization)  Description: Pt. Will follow recommendations of treatment team during hospital stay.   Pt. Will attend  >50% of unit programing during hospital stay.   Pt. Will sleep 6-8 hours a night during hospital stay.  Pt. Will maintain ADLs without prompting during hospital stay.   Outcome: Progressing     Problem: Psychotic Signs/Symptoms  Goal: Improved Behavioral Control (Psychotic Signs/Symptoms)  Outcome: Progressing     Face to face end of shift report communicated to oncoming shift RN.

## 2024-01-04 NOTE — PLAN OF CARE
Problem: Adult Behavioral Health Plan of Care  Goal: Patient-Specific Goal (Individualization)  Description: Pt. Will follow recommendations of treatment team during hospital stay.   Pt. Will attend  >50% of unit programing during hospital stay.   Pt. Will sleep 6-8 hours a night during hospital stay.  Pt. Will maintain ADLs without prompting during hospital stay.   Outcome: Progressing     Problem: Psychotic Signs/Symptoms  Goal: Improved Behavioral Control (Psychotic Signs/Symptoms)  Outcome: Progressing     Face to Face report received from MILENA Webb.  Rounding completed. Patient observed in bed.  Patient slept 7 hours with even & unlabored respirations.  15 minute safety checks completed.  Patient was free from falls or self-harm.  Will continue to support the needs of the patient.       Face to face end of shift report [facility].     Elyse Daniels RN  1/4/2024  6:08 AM
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
## 2024-01-04 NOTE — PLAN OF CARE
Face to face end of shift report will be communicated to oncoming RN.    Problem: Adult Behavioral Health Plan of Care  Goal: Patient-Specific Goal (Individualization)  Description: Pt. Will follow recommendations of treatment team during hospital stay.   Pt. Will attend  >50% of unit programing during hospital stay.   Pt. Will sleep 6-8 hours a night during hospital stay.  Pt. Will maintain ADLs without prompting during hospital stay.   Outcome: Progressing     Problem: Psychotic Signs/Symptoms  Goal: Improved Behavioral Control (Psychotic Signs/Symptoms)  Outcome: Progressing  Face to face end of shift report obtained from MILENA Webb. Pt observed resting in bed.  Pt appears to be sleeping in bed with eyes closed. 15 minutes and PRN safety checks completed with no noted complains. No delusional comments or bizarre behaviors noted or reported so far this shift.   0600-Pt appeared to had slept all night.

## 2024-01-04 NOTE — PROGRESS NOTES
Chart reviewed. Vitals stable. Chest xray showed no acute cardiopulmonary process. No noted acute medical complaints.     Pt medically stable, no acute medical concerns. Chronic medical problems stable. Will sign off. Please consult for any new medical issues or concerns.

## 2024-01-05 PROCEDURE — 99232 SBSQ HOSP IP/OBS MODERATE 35: CPT

## 2024-01-05 PROCEDURE — 250N000013 HC RX MED GY IP 250 OP 250 PS 637

## 2024-01-05 PROCEDURE — 250N000013 HC RX MED GY IP 250 OP 250 PS 637: Performed by: NURSE PRACTITIONER

## 2024-01-05 PROCEDURE — 124N000004

## 2024-01-05 RX ADMIN — BUSPIRONE HYDROCHLORIDE 5 MG: 5 TABLET ORAL at 20:16

## 2024-01-05 RX ADMIN — Medication 50 MCG: at 09:44

## 2024-01-05 RX ADMIN — SENNOSIDES AND DOCUSATE SODIUM 2 TABLET: 8.6; 5 TABLET ORAL at 09:43

## 2024-01-05 RX ADMIN — LURASIDONE HYDROCHLORIDE 40 MG: 40 TABLET, FILM COATED ORAL at 11:44

## 2024-01-05 RX ADMIN — SENNOSIDES AND DOCUSATE SODIUM 2 TABLET: 8.6; 5 TABLET ORAL at 20:16

## 2024-01-05 RX ADMIN — BUSPIRONE HYDROCHLORIDE 5 MG: 5 TABLET ORAL at 09:44

## 2024-01-05 RX ADMIN — HYDROXYZINE HYDROCHLORIDE 25 MG: 25 TABLET, FILM COATED ORAL at 20:18

## 2024-01-05 RX ADMIN — QUETIAPINE 300 MG: 100 TABLET ORAL at 20:16

## 2024-01-05 RX ADMIN — POLYETHYLENE GLYCOL 3350 17 G: 17 POWDER, FOR SOLUTION ORAL at 09:44

## 2024-01-05 ASSESSMENT — ACTIVITIES OF DAILY LIVING (ADL)
ADLS_ACUITY_SCORE: 31
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
## 2024-01-05 NOTE — PLAN OF CARE
Problem: Adult Behavioral Health Plan of Care  Goal: Patient-Specific Goal (Individualization)  Description: Pt. Will follow recommendations of treatment team during hospital stay.   Pt. Will attend  >50% of unit programing during hospital stay.   Pt. Will sleep 6-8 hours a night during hospital stay.  Pt. Will maintain ADLs without prompting during hospital stay.   Outcome: Progressing     Problem: Psychotic Signs/Symptoms  Goal: Improved Behavioral Control (Psychotic Signs/Symptoms)  Outcome: Progressing     Pt denies SI/HI, AH/VH, pain and depression. Pt endorses anxiety. Pt is medication compliant and VSS. Pt ate 100% of dinner. Pt has a flat affect. Pt is calm and alert. Pt is using appropriate behavior with staff and peers. Pt out in lounge more this shift. Pt out in laughing and walking with roommate out in lounge. 1752 PRN atarax 25 mg for 8/10 anxiety.     Face to face report will be communicated to oncoming RN.    Tracey Nieves RN  1/4/2024

## 2024-01-05 NOTE — PROGRESS NOTES
The patient's  Julito Richey called SW and stated she cancelled the revocation process since the patient is cooperating with services at this time. If the patient becomes uncooperative she advises the hospital to put the patient on a hold and notify her ASAP. She then will submit another intent to revoke.

## 2024-01-05 NOTE — PLAN OF CARE
Face to face end of shift report received from Clarissa CABELLO RN. Rounding completed and [facility]. No requests at this time.     Goal Outcome Evaluation: Patient has been polite and cooperative. He denied pain. He endorsed mild anxiety and depression but denied HI/SI and hallucinations. He is clean and neatly dressed. He participating. He slept in past breakfast. When he was given his morning meds, he was sure he had already taken them. He argued with this writer and then said he must've taken other meds he usually takes. He wouldn't take no for an answer but took them. He is pleasant with peers.     15:15 Update: Face to face end of shift report communicated to the charge nurse. This writer will continue to provide nursing services for patient throughout the next shift. Rounding completed and patient observed.    20:18 Update: Patient requested to take 25mg Atarax to help him fall asleep.     Face to face end of shift report communicated to oncbessy RN.        Problem: Adult Behavioral Health Plan of Care  Goal: Patient-Specific Goal (Individualization)  Description: Pt. Will follow recommendations of treatment team during hospital stay.   Pt. Will attend  >50% of unit programing during hospital stay.   Pt. Will sleep 6-8 hours a night during hospital stay.  Pt. Will maintain ADLs without prompting during hospital stay.   Outcome: Not Progressing     Problem: Psychotic Signs/Symptoms  Goal: Optimal Cognitive Function (Psychotic Signs/Symptoms)  Description: Pt will be free from psychotic symptoms prior to discharge  Outcome: Not Progressing

## 2024-01-05 NOTE — PLAN OF CARE
Face to face end of shift report will be communicated to oncoming RN.     Problem: Adult Behavioral Health Plan of Care  Goal: Patient-Specific Goal (Individualization)  Description: Pt. Will follow recommendations of treatment team during hospital stay.   Pt. Will attend  >50% of unit programing during hospital stay.   Pt. Will sleep 6-8 hours a night during hospital stay.  Pt. Will maintain ADLs without prompting during hospital stay.   Outcome: Progressing     Problem: Psychotic Signs/Symptoms  Goal: Improved Behavioral Control (Psychotic Signs/Symptoms)  Outcome: Progressing   Face to face end of shift report obtained from MILENA Webb. Pt observed resting in bed.  Pt appears to be sleeping in bed with eyes closed. 15 minutes and PRN safety checks completed with no noted complains. No delusional comments or bizarre behaviors noted or reported so far this shift.   0600-Pt appeared to had slept 5 hours.

## 2024-01-06 PROCEDURE — 250N000013 HC RX MED GY IP 250 OP 250 PS 637: Performed by: NURSE PRACTITIONER

## 2024-01-06 PROCEDURE — 250N000013 HC RX MED GY IP 250 OP 250 PS 637

## 2024-01-06 PROCEDURE — 99232 SBSQ HOSP IP/OBS MODERATE 35: CPT | Mod: 95 | Performed by: STUDENT IN AN ORGANIZED HEALTH CARE EDUCATION/TRAINING PROGRAM

## 2024-01-06 PROCEDURE — 124N000004

## 2024-01-06 RX ADMIN — SENNOSIDES AND DOCUSATE SODIUM 2 TABLET: 8.6; 5 TABLET ORAL at 08:49

## 2024-01-06 RX ADMIN — HYDROXYZINE HYDROCHLORIDE 25 MG: 25 TABLET, FILM COATED ORAL at 20:17

## 2024-01-06 RX ADMIN — POLYETHYLENE GLYCOL 3350 17 G: 17 POWDER, FOR SOLUTION ORAL at 08:49

## 2024-01-06 RX ADMIN — LURASIDONE HYDROCHLORIDE 40 MG: 40 TABLET, FILM COATED ORAL at 13:03

## 2024-01-06 RX ADMIN — SENNOSIDES AND DOCUSATE SODIUM 2 TABLET: 8.6; 5 TABLET ORAL at 20:15

## 2024-01-06 RX ADMIN — QUETIAPINE 300 MG: 100 TABLET ORAL at 20:15

## 2024-01-06 RX ADMIN — BUSPIRONE HYDROCHLORIDE 5 MG: 5 TABLET ORAL at 08:49

## 2024-01-06 RX ADMIN — Medication 50 MCG: at 08:49

## 2024-01-06 RX ADMIN — BUSPIRONE HYDROCHLORIDE 5 MG: 5 TABLET ORAL at 20:15

## 2024-01-06 ASSESSMENT — ACTIVITIES OF DAILY LIVING (ADL)
DRESS: INDEPENDENT
ORAL_HYGIENE: INDEPENDENT
ADLS_ACUITY_SCORE: 31
ORAL_HYGIENE: INDEPENDENT
ADLS_ACUITY_SCORE: 31
LAUNDRY: UNABLE TO COMPLETE
LAUNDRY: UNABLE TO COMPLETE
HYGIENE/GROOMING: INDEPENDENT
ADLS_ACUITY_SCORE: 31
HYGIENE/GROOMING: INDEPENDENT
DRESS: SCRUBS (BEHAVIORAL HEALTH);INDEPENDENT
ADLS_ACUITY_SCORE: 31
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
## 2024-01-06 NOTE — PLAN OF CARE
Face to face end of shift report will be communicated to oncoming RN.    Problem: Adult Behavioral Health Plan of Care  Goal: Patient-Specific Goal (Individualization)  Description: Pt. Will follow recommendations of treatment team during hospital stay.   Pt. Will attend  >50% of unit programing during hospital stay.   Pt. Will sleep 6-8 hours a night during hospital stay.  Pt. Will maintain ADLs without prompting during hospital stay.   Outcome: Progressing     Problem: Psychotic Signs/Symptoms  Goal: Optimal Cognitive Function (Psychotic Signs/Symptoms)  Description: Pt will be free from psychotic symptoms prior to discharge  Outcome: Progressing    Face to face end of shift report obtained from MILENA Giraldo. Pt observed resting in bed.  Pt appears to be sleeping in bed with eyes closed. 15 minutes and PRN safety checks completed with no noted complains. No delusional comments or bizarre behaviors noted or reported so far this shift.    0600-Pt appeared to had slept all night.

## 2024-01-06 NOTE — PLAN OF CARE
Face to face end of shift report received from Clarissa CABELLO RN. Rounding completed. Patient observed in [facility].     Pt has been calm, cooperative this shift. He has a blunted, flat affect. He denied any SI/HI and AH/VH. Denied pain. He has attended groups with appropriate behaviors. He reports that he slept okay. Pt took all medications as prescribed. Steady gait- no falls. He is able to make his needs known. Frequent rounding.       Problem: Adult Behavioral Health Plan of Care  Goal: Patient-Specific Goal (Individualization)  Description: Pt. Will follow recommendations of treatment team during hospital stay.   Pt. Will attend  >50% of unit programing during hospital stay.   Pt. Will sleep 6-8 hours a night during hospital stay.  Pt. Will maintain ADLs without prompting during hospital stay.   Outcome: Progressing     Problem: Psychotic Signs/Symptoms  Goal: Improved Behavioral Control (Psychotic Signs/Symptoms)  Outcome: Progressing       Priscilla Sutherland RN  1/6/2024  11:19 AM

## 2024-01-07 LAB
ATRIAL RATE - MUSE: 97 BPM
DIASTOLIC BLOOD PRESSURE - MUSE: NORMAL MMHG
INTERPRETATION ECG - MUSE: NORMAL
P AXIS - MUSE: 46 DEGREES
PR INTERVAL - MUSE: 190 MS
QRS DURATION - MUSE: 90 MS
QT - MUSE: 358 MS
QTC - MUSE: 454 MS
R AXIS - MUSE: -17 DEGREES
SYSTOLIC BLOOD PRESSURE - MUSE: NORMAL MMHG
T AXIS - MUSE: 50 DEGREES
VENTRICULAR RATE- MUSE: 97 BPM

## 2024-01-07 PROCEDURE — 250N000013 HC RX MED GY IP 250 OP 250 PS 637: Performed by: STUDENT IN AN ORGANIZED HEALTH CARE EDUCATION/TRAINING PROGRAM

## 2024-01-07 PROCEDURE — 250N000013 HC RX MED GY IP 250 OP 250 PS 637: Performed by: NURSE PRACTITIONER

## 2024-01-07 PROCEDURE — 93005 ELECTROCARDIOGRAM TRACING: CPT

## 2024-01-07 PROCEDURE — 124N000004

## 2024-01-07 PROCEDURE — 250N000013 HC RX MED GY IP 250 OP 250 PS 637

## 2024-01-07 PROCEDURE — 99232 SBSQ HOSP IP/OBS MODERATE 35: CPT | Mod: 95 | Performed by: STUDENT IN AN ORGANIZED HEALTH CARE EDUCATION/TRAINING PROGRAM

## 2024-01-07 PROCEDURE — 93010 ELECTROCARDIOGRAM REPORT: CPT | Performed by: INTERNAL MEDICINE

## 2024-01-07 RX ORDER — LORAZEPAM 1 MG/1
1 TABLET ORAL ONCE
Status: COMPLETED | OUTPATIENT
Start: 2024-01-07 | End: 2024-01-07

## 2024-01-07 RX ADMIN — HYDROXYZINE HYDROCHLORIDE 25 MG: 25 TABLET, FILM COATED ORAL at 20:17

## 2024-01-07 RX ADMIN — SENNOSIDES AND DOCUSATE SODIUM 2 TABLET: 8.6; 5 TABLET ORAL at 08:26

## 2024-01-07 RX ADMIN — LORAZEPAM 1 MG: 1 TABLET ORAL at 00:17

## 2024-01-07 RX ADMIN — ACETAMINOPHEN 650 MG: 325 TABLET, FILM COATED ORAL at 06:41

## 2024-01-07 RX ADMIN — LURASIDONE HYDROCHLORIDE 60 MG: 20 TABLET, FILM COATED ORAL at 12:36

## 2024-01-07 RX ADMIN — ACETAMINOPHEN 650 MG: 325 TABLET, FILM COATED ORAL at 20:17

## 2024-01-07 RX ADMIN — BUSPIRONE HYDROCHLORIDE 5 MG: 5 TABLET ORAL at 08:26

## 2024-01-07 RX ADMIN — SENNOSIDES AND DOCUSATE SODIUM 2 TABLET: 8.6; 5 TABLET ORAL at 20:17

## 2024-01-07 RX ADMIN — QUETIAPINE 300 MG: 100 TABLET ORAL at 20:17

## 2024-01-07 RX ADMIN — Medication 50 MCG: at 08:25

## 2024-01-07 RX ADMIN — HYDROXYZINE HYDROCHLORIDE 25 MG: 25 TABLET, FILM COATED ORAL at 14:38

## 2024-01-07 RX ADMIN — POLYETHYLENE GLYCOL 3350 17 G: 17 POWDER, FOR SOLUTION ORAL at 08:25

## 2024-01-07 RX ADMIN — BUSPIRONE HYDROCHLORIDE 5 MG: 5 TABLET ORAL at 20:17

## 2024-01-07 ASSESSMENT — ACTIVITIES OF DAILY LIVING (ADL)
ADLS_ACUITY_SCORE: 31
LAUNDRY: UNABLE TO COMPLETE
ORAL_HYGIENE: INDEPENDENT
ADLS_ACUITY_SCORE: 31
HYGIENE/GROOMING: INDEPENDENT
DRESS: INDEPENDENT
ADLS_ACUITY_SCORE: 31
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
## 2024-01-07 NOTE — PLAN OF CARE
Face to face end of shift report will be communicated to oncbessy RN.    Problem: Adult Behavioral Health Plan of Care  Goal: Patient-Specific Goal (Individualization)  Description: Pt. Will follow recommendations of treatment team during hospital stay.   Pt. Will attend  >50% of unit programing during hospital stay.   Pt. Will sleep 6-8 hours a night during hospital stay.  Pt. Will maintain ADLs without prompting during hospital stay.   Outcome: Progressing     Problem: Psychotic Signs/Symptoms  Goal: Improved Behavioral Control (Psychotic Signs/Symptoms)  Outcome: Progressing   Face to face end of shift report obtained from MILENA Jordan.   At around 2350, pt walks out of his room c/o he is having chest pain and that he is having a heart attack because his one arm feels numb. Before staff could get vital signs. Pt went to bathroom. Then got up and allowed vs. VSS and with parameters with slight pulse of 108. Pt asking for an EKG. Staff able to talk to him about trying something for anxiety. Pt has already gotten PRN atarax earlier for same complains. Montserrat Yanes NP called and notified of patient's symptoms and behaviors. One time order received from Ativan 1 mg. Pt accepted medication at 0017. Pt laid in bed after pt was assured that pt will be monitored. Remind patient of safety checks and that pt is located next door of nurse's station.   Vital signs:  Temp: 97.6  F (36.4  C) Temp src: Tympanic BP: 123/77 Pulse: 108   Resp: 18 SpO2: 97 % O2 Device: None (Room air)       0130-Pt appears to be sleeping.  0600-Pt appeared to had slept 6 hours. Pt reports medication helped but chest still feel a little sore.   0641-Pt c/o 7/10 chest pain/soreness. Acetaminophen 650 mg given. Pt also asked if he can talk to provider regarding chest xray he had done. Sticky note left for provider.

## 2024-01-07 NOTE — PLAN OF CARE
Problem: Adult Behavioral Health Plan of Care  Goal: Patient-Specific Goal (Individualization)  Description: Pt. Will follow recommendations of treatment team during hospital stay.   Pt. Will attend  >50% of unit programing during hospital stay.   Pt. Will sleep 6-8 hours a night during hospital stay.  Pt. Will maintain ADLs without prompting during hospital stay.   Outcome: Not Progressing  Note: Report received from Priscilla ABBOTT. Rounding complete.  Patient is in the lounge watching television at start of shift.    He is polite during interactions.  He is able to make his needs known. His affect is full range.  He is social with peers.   2017:  Patient states he is worried he will have a heart attack in the middle of the night. He states he was more anxious which was causing him to have chest tightness.  He states he hears multiple voices but can only have conversations with certain voices. Patient requested and received PRN hydroxyzine 25 mg po for anxiety.  Staff talked with patient about grounding exercises for anxiety.  He was receptive to this grounding techniques and no longer c/o high anxiety or chest tightness.  VSS.      01/06/24 2042   Vital Signs   Temp 97.5  F (36.4  C)   Temp src Tympanic   Resp 18   Pulse 99   Pulse Rate Source Monitor   /84   Oxygen Therapy   SpO2 97 %   O2 Device None (Room air)

## 2024-01-08 PROCEDURE — 250N000013 HC RX MED GY IP 250 OP 250 PS 637: Performed by: STUDENT IN AN ORGANIZED HEALTH CARE EDUCATION/TRAINING PROGRAM

## 2024-01-08 PROCEDURE — 250N000013 HC RX MED GY IP 250 OP 250 PS 637: Performed by: NURSE PRACTITIONER

## 2024-01-08 PROCEDURE — 250N000013 HC RX MED GY IP 250 OP 250 PS 637

## 2024-01-08 PROCEDURE — 99232 SBSQ HOSP IP/OBS MODERATE 35: CPT | Mod: 95 | Performed by: STUDENT IN AN ORGANIZED HEALTH CARE EDUCATION/TRAINING PROGRAM

## 2024-01-08 PROCEDURE — 124N000004

## 2024-01-08 RX ADMIN — LURASIDONE HYDROCHLORIDE 60 MG: 20 TABLET, FILM COATED ORAL at 12:34

## 2024-01-08 RX ADMIN — HYDROXYZINE HYDROCHLORIDE 25 MG: 25 TABLET, FILM COATED ORAL at 20:11

## 2024-01-08 RX ADMIN — HYDROXYZINE HYDROCHLORIDE 25 MG: 25 TABLET, FILM COATED ORAL at 09:03

## 2024-01-08 RX ADMIN — BUSPIRONE HYDROCHLORIDE 5 MG: 5 TABLET ORAL at 09:03

## 2024-01-08 RX ADMIN — SENNOSIDES AND DOCUSATE SODIUM 2 TABLET: 8.6; 5 TABLET ORAL at 20:08

## 2024-01-08 RX ADMIN — QUETIAPINE 300 MG: 100 TABLET ORAL at 20:08

## 2024-01-08 RX ADMIN — POLYETHYLENE GLYCOL 3350 17 G: 17 POWDER, FOR SOLUTION ORAL at 09:02

## 2024-01-08 RX ADMIN — Medication 50 MCG: at 09:03

## 2024-01-08 RX ADMIN — BUSPIRONE HYDROCHLORIDE 5 MG: 5 TABLET ORAL at 20:09

## 2024-01-08 RX ADMIN — SENNOSIDES AND DOCUSATE SODIUM 2 TABLET: 8.6; 5 TABLET ORAL at 09:02

## 2024-01-08 ASSESSMENT — ACTIVITIES OF DAILY LIVING (ADL)
ORAL_HYGIENE: INDEPENDENT
ADLS_ACUITY_SCORE: 31
HYGIENE/GROOMING: INDEPENDENT
ADLS_ACUITY_SCORE: 31
ADLS_ACUITY_SCORE: 31
LAUNDRY: UNABLE TO COMPLETE
ADLS_ACUITY_SCORE: 31
HYGIENE/GROOMING: INDEPENDENT
DRESS: INDEPENDENT
ORAL_HYGIENE: INDEPENDENT
ADLS_ACUITY_SCORE: 31
DRESS: INDEPENDENT
ADLS_ACUITY_SCORE: 31

## 2024-01-08 NOTE — PROGRESS NOTES
1:1 time with the patient.  No changes made to the discharge plan at this time.   See the AVS for follow up appointments and recommendations.      SW spoke with the patient. He stated he would cooperate and would like to have IRTS referrals submitted.

## 2024-01-08 NOTE — PLAN OF CARE
Face to face end of shift report received from Clarissa CABELLO RN. Rounding completed. Patient observed in bed, awake.     Pt has been withdrawn, isolative to his room for the majority of the shift. He reports that he feels he is doing better. He endorses anxiety and requested PRN Hydroxyzine. He reported that this was affective. He denied any SI/HI and AH/VH. Denied pain- still stated he was having some chest discomfort. He hasn't attended groups today. He comes out to the lounge for meals, but engages minimally. Steady gait- no falls. He is able to make his needs known. Frequent rounding.    Problem: Adult Behavioral Health Plan of Care  Goal: Patient-Specific Goal (Individualization)  Description: Pt. Will follow recommendations of treatment team during hospital stay.   Pt. Will attend  >50% of unit programing during hospital stay.   Pt. Will sleep 6-8 hours a night during hospital stay.  Pt. Will maintain ADLs without prompting during hospital stay.   Outcome: Progressing     Problem: Psychotic Signs/Symptoms  Goal: Improved Behavioral Control (Psychotic Signs/Symptoms)  Outcome: Progressing       Priscilla Sutherland RN  1/8/2024  2:32 PM
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
## 2024-01-08 NOTE — PLAN OF CARE
Face to face end of shift report will be communicated to alejandro RN.    Problem: Adult Behavioral Health Plan of Care  Goal: Patient-Specific Goal (Individualization)  Description: Pt. Will follow recommendations of treatment team during hospital stay.   Pt. Will attend  >50% of unit programing during hospital stay.   Pt. Will sleep 6-8 hours a night during hospital stay.  Pt. Will maintain ADLs without prompting during hospital stay.   Outcome: Progressing     Problem: Psychotic Signs/Symptoms  Goal: Improved Behavioral Control (Psychotic Signs/Symptoms)  Outcome: Progressing    Face to face end of shift report obtained from MILENA Jordan. Pt observed resting in bed.  Pt appears to be sleeping in bed with eyes closed. 15 minutes and PRN safety checks completed with no noted complains. No delusional comments or bizarre behaviors noted or reported so far this shift.    0600-Pt appeared to had slept 6.5 hours.

## 2024-01-08 NOTE — PLAN OF CARE
Problem: Adult Behavioral Health Plan of Care  Goal: Patient-Specific Goal (Individualization)  Description: Pt. Will follow recommendations of treatment team during hospital stay.   Pt. Will attend  >50% of unit programing during hospital stay.   Pt. Will sleep 6-8 hours a night during hospital stay.  Pt. Will maintain ADLs without prompting during hospital stay.   Outcome: Not Progressing  Note: Patient c/o anxiety and some chest wall pain.  Pulse 100.  He states he feels sightly dizzy at times when he stands up quickly.  He initially agreed to allow orthostatic BP and pulse but then declined when approached by staff.   He is anxious about not having housing after he leaves the hospital.  He is polite during interactions.  He is cooperative with medications and assessment.    2017:  Patient requested and received PRN acetaminophen 650 mg po for c/o chest wall pain and hydroxyzine 25 mg po for c/o anxiety.       Goal Outcome Evaluation:

## 2024-01-08 NOTE — PLAN OF CARE
Problem: Adult Behavioral Health Plan of Care  Goal: Patient-Specific Goal (Individualization)  Description: Pt. Will follow recommendations of treatment team during hospital stay.   Pt. Will attend  >50% of unit programing during hospital stay.   Pt. Will sleep 6-8 hours a night during hospital stay.  Pt. Will maintain ADLs without prompting during hospital stay.   Outcome: Progressing     Problem: Adult Behavioral Health Plan of Care  Goal: Adheres to Safety Considerations for Self and Others  Outcome: Progressing     Problem: Psychotic Signs/Symptoms  Goal: Improved Behavioral Control (Psychotic Signs/Symptoms)  Outcome: Progressing     Problem: Psychotic Signs/Symptoms  Goal: Optimal Cognitive Function (Psychotic Signs/Symptoms)  Description: Pt will be free from psychotic symptoms prior to discharge  Outcome: Progressing     Problem: Psychotic Signs/Symptoms  Goal: Improved Mood Symptoms (Psychotic Signs/Symptoms)  Outcome: Progressing  15:50 - Face to face report was received from day shift rn, pt observed sitting out in lounge watching tv with peers.  16:30 - pt pleasant and cooperative, denies si or hi, denies need for any pain med as he has about 1/10 pain in chest that he states is tolerable.  No cough or sob.  States he went to a couple of groups today and has tried to get out of his room more and states he did enjoy the groups today.  Endorses voices, but states they are better and are not commanding only voices at times.  States is only having anxiety related to not knowing exactly where he will go when he leaves here.  He does state may be an irts and then he will need assist with housing.  Pt aware discharge will be planned.  Is able to make needs known and is agreeable with plan of care and takes meds as ordered.  20:30 - pt was medicated with prn atarax for anxiety 8/10.  Out in lounge eating snack.  21:15 - pt in bed with eyes closed, face to face end of shift report communicated to MILENA Hickman  for continuation of care.

## 2024-01-09 PROCEDURE — 250N000013 HC RX MED GY IP 250 OP 250 PS 637

## 2024-01-09 PROCEDURE — 99232 SBSQ HOSP IP/OBS MODERATE 35: CPT | Mod: 95 | Performed by: STUDENT IN AN ORGANIZED HEALTH CARE EDUCATION/TRAINING PROGRAM

## 2024-01-09 PROCEDURE — 250N000013 HC RX MED GY IP 250 OP 250 PS 637: Performed by: NURSE PRACTITIONER

## 2024-01-09 PROCEDURE — 124N000004

## 2024-01-09 PROCEDURE — 250N000013 HC RX MED GY IP 250 OP 250 PS 637: Performed by: STUDENT IN AN ORGANIZED HEALTH CARE EDUCATION/TRAINING PROGRAM

## 2024-01-09 RX ORDER — BUSPIRONE HYDROCHLORIDE 10 MG/1
10 TABLET ORAL 2 TIMES DAILY
Status: DISCONTINUED | OUTPATIENT
Start: 2024-01-09 | End: 2024-01-26 | Stop reason: HOSPADM

## 2024-01-09 RX ADMIN — SENNOSIDES AND DOCUSATE SODIUM 2 TABLET: 8.6; 5 TABLET ORAL at 20:32

## 2024-01-09 RX ADMIN — SENNOSIDES AND DOCUSATE SODIUM 2 TABLET: 8.6; 5 TABLET ORAL at 08:10

## 2024-01-09 RX ADMIN — LURASIDONE HYDROCHLORIDE 60 MG: 20 TABLET, FILM COATED ORAL at 12:16

## 2024-01-09 RX ADMIN — BUSPIRONE HYDROCHLORIDE 5 MG: 5 TABLET ORAL at 08:10

## 2024-01-09 RX ADMIN — BUSPIRONE HYDROCHLORIDE 10 MG: 10 TABLET ORAL at 20:32

## 2024-01-09 RX ADMIN — Medication 50 MCG: at 08:10

## 2024-01-09 RX ADMIN — POLYETHYLENE GLYCOL 3350 17 G: 17 POWDER, FOR SOLUTION ORAL at 08:10

## 2024-01-09 RX ADMIN — QUETIAPINE 300 MG: 100 TABLET ORAL at 20:32

## 2024-01-09 ASSESSMENT — ACTIVITIES OF DAILY LIVING (ADL)
ADLS_ACUITY_SCORE: 31
DRESS: INDEPENDENT
ADLS_ACUITY_SCORE: 31
ADLS_ACUITY_SCORE: 31
LAUNDRY: UNABLE TO COMPLETE
LAUNDRY: UNABLE TO COMPLETE
ADLS_ACUITY_SCORE: 31
HYGIENE/GROOMING: INDEPENDENT
HYGIENE/GROOMING: INDEPENDENT
ORAL_HYGIENE: INDEPENDENT
ADLS_ACUITY_SCORE: 31
ORAL_HYGIENE: INDEPENDENT
ADLS_ACUITY_SCORE: 31
DRESS: INDEPENDENT;SCRUBS (BEHAVIORAL HEALTH)
ADLS_ACUITY_SCORE: 31

## 2024-01-09 NOTE — PLAN OF CARE
Face to face end of shift report received from Sirena MURRAY RN.  Pt observed awake in bed.     Problem: Adult Behavioral Health Plan of Care  Goal: Patient-Specific Goal (Individualization)  Description: Pt. Will follow recommendations of treatment team during hospital stay.   Pt. Will attend  >50% of unit programing during hospital stay.   Pt. Will sleep 6-8 hours a night during hospital stay.  Pt. Will maintain ADLs without prompting during hospital stay.   Outcome: Progressing     Problem: Psychotic Signs/Symptoms  Goal: Improved Behavioral Control (Psychotic Signs/Symptoms)  Outcome: Progressing   Goal Outcome Evaluation:         Pt appeared to sleep 7 hours through the night with a normal breathing pattern.  Pt made noted position changes.15 min checks completed throughout shift.  Pt did not c/o or display self harm/ suicidal thoughts/bechaviors. Pt makes needs know.     Face to face end of shift report communicated to oncoming RN.     Marylou Adrian RN  1/9/2024
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
## 2024-01-09 NOTE — PLAN OF CARE
Face to face end of shift report received from Marylou MURRAY RN. Rounding completed. Patient observed in bed, awake.     Pt has been calm, cooperative. He remains withdrawn today. He states he is going to try to make it an attempt to participate more today. He has gone to groups with some participation. He denied any SI/HI and AH/VH. Denied pain today- stated his chest discomfort has since improved. Offered PRN Hydroxyzine this morning and pt declined stating he would request if he needs it. Pt took all medications as prescribed. Pt has been out in the lounge and attending groups much more today. Steady gait- no falls. He is able to make his needs known. Frequent rounding.     Problem: Adult Behavioral Health Plan of Care  Goal: Patient-Specific Goal (Individualization)  Description: Pt. Will follow recommendations of treatment team during hospital stay.   Pt. Will attend  >50% of unit programing during hospital stay.   Pt. Will sleep 6-8 hours a night during hospital stay.  Pt. Will maintain ADLs without prompting during hospital stay.   Outcome: Progressing     Problem: Psychotic Signs/Symptoms  Goal: Improved Behavioral Control (Psychotic Signs/Symptoms)  Outcome: Progressing         Priscilla Sutherland RN  1/9/2024  9:45 AM

## 2024-01-09 NOTE — PROGRESS NOTES
KELLY submitted referral to [facility] IRTS    KELLY submitted referral to [facility] IRTS    KELLY submitted IRTS referral to [facility].     SW submitted IRTS referral to [facility]    SW submitted IRTS referral to [facility] IRTS     SW submitted referral to [facility] IRTS    SW submitted referral to [facility] IRTS    SW submitted IRTS referral to [facility]

## 2024-01-09 NOTE — PLAN OF CARE
MARCIO RAPHAEL RN  1/9/2024  3:38 PM  Face to face shift report received from MILENA Wells. Rounding completed, pt observed.     Denies SI, HI, anxiety, depression, hallucinations, and pain this shift.  Attended group.  Compliant with meds and cooperative with staff.  Socialized with peers in [facility] [place].      Problem: Adult Behavioral Health Plan of Care  Goal: Patient-Specific Goal (Individualization)  Description: Pt. Will follow recommendations of treatment team during hospital stay.   Pt. Will attend  >50% of unit programing during hospital stay.   Pt. Will sleep 6-8 hours a night during hospital stay.  Pt. Will maintain ADLs without prompting during hospital stay.   Outcome: Progressing     Problem: Psychotic Signs/Symptoms  Goal: Improved Behavioral Control (Psychotic Signs/Symptoms)  Outcome: Progressing     Face to face end of shift report communicated to oncoming shift RN.

## 2024-01-10 PROCEDURE — 250N000013 HC RX MED GY IP 250 OP 250 PS 637: Performed by: NURSE PRACTITIONER

## 2024-01-10 PROCEDURE — 124N000004

## 2024-01-10 PROCEDURE — 99232 SBSQ HOSP IP/OBS MODERATE 35: CPT | Mod: 95 | Performed by: STUDENT IN AN ORGANIZED HEALTH CARE EDUCATION/TRAINING PROGRAM

## 2024-01-10 PROCEDURE — 250N000013 HC RX MED GY IP 250 OP 250 PS 637

## 2024-01-10 PROCEDURE — 250N000013 HC RX MED GY IP 250 OP 250 PS 637: Performed by: STUDENT IN AN ORGANIZED HEALTH CARE EDUCATION/TRAINING PROGRAM

## 2024-01-10 RX ADMIN — POLYETHYLENE GLYCOL 3350 17 G: 17 POWDER, FOR SOLUTION ORAL at 08:51

## 2024-01-10 RX ADMIN — SENNOSIDES AND DOCUSATE SODIUM 2 TABLET: 8.6; 5 TABLET ORAL at 20:25

## 2024-01-10 RX ADMIN — LURASIDONE HYDROCHLORIDE 60 MG: 20 TABLET, FILM COATED ORAL at 12:25

## 2024-01-10 RX ADMIN — BUSPIRONE HYDROCHLORIDE 10 MG: 10 TABLET ORAL at 20:25

## 2024-01-10 RX ADMIN — QUETIAPINE 300 MG: 100 TABLET ORAL at 20:25

## 2024-01-10 RX ADMIN — Medication 50 MCG: at 08:47

## 2024-01-10 RX ADMIN — SENNOSIDES AND DOCUSATE SODIUM 2 TABLET: 8.6; 5 TABLET ORAL at 08:47

## 2024-01-10 RX ADMIN — BUSPIRONE HYDROCHLORIDE 10 MG: 10 TABLET ORAL at 08:47

## 2024-01-10 ASSESSMENT — ACTIVITIES OF DAILY LIVING (ADL)
ORAL_HYGIENE: INDEPENDENT
DRESS: INDEPENDENT
ADLS_ACUITY_SCORE: 31
HYGIENE/GROOMING: INDEPENDENT
LAUNDRY: UNABLE TO COMPLETE
HYGIENE/GROOMING: INDEPENDENT
ADLS_ACUITY_SCORE: 31
ORAL_HYGIENE: INDEPENDENT
DRESS: SCRUBS (BEHAVIORAL HEALTH);INDEPENDENT
LAUNDRY: UNABLE TO COMPLETE
ADLS_ACUITY_SCORE: 31
ADLS_ACUITY_SCORE: 31

## 2024-01-10 NOTE — PROGRESS NOTES
[facility] emailed  that they are going to be contacting  back to schedule an interview with the patient.     KELLY provided the patient with a handout for [facility]. The patient handed the handout back to  and stated he is not interested.
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
## 2024-01-10 NOTE — PLAN OF CARE
Face to face shift report received from nurse. Rounding completed, pt observed.    Problem: Adult Behavioral Health Plan of Care  Goal: Patient-Specific Goal (Individualization)  Description: Pt. Will follow recommendations of treatment team during hospital stay.   Pt. Will attend  >50% of unit programing during hospital stay.   Pt. Will sleep 6-8 hours a night during hospital stay.  Pt. Will maintain ADLs without prompting during hospital stay.   Outcome: Progressing  Patient slept 2.5 hours. Patient didn't sleep well due to room mate in and out of room. Patient remained in room and tried to sleep. No issues noted.     Face to face report will be communicated to oncoming RN.    Lamonte Adrian RN  1/10/2024

## 2024-01-10 NOTE — PLAN OF CARE
"Face to face end of shift report received from Lamonte MURRAY RN. Rounding completed. [place] [facility]     Pt has been calm, cooperative. He participates in the unit milieu with appropriate behaviors. He is less withdrawn and engages with peers. He reports \"I'm in a great mood.\" He denied any SI/HI and AH/VH. Denied pain today- stated his chest discomfort has since improved. He then starts to look at his veins then elbows stating \"Yep, everything is looking pretty good.\" Pt took all medications as prescribed. Pt has been out in the lounge and attending groups much more today. Steady gait- no falls. He is able to make his needs known. Frequent rounding.      Problem: Adult Behavioral Health Plan of Care  Goal: Patient-Specific Goal (Individualization)  Description: Pt. Will follow recommendations of treatment team during hospital stay.   Pt. Will attend  >50% of unit programing during hospital stay.   Pt. Will sleep 6-8 hours a night during hospital stay.  Pt. Will maintain ADLs without prompting during hospital stay.   Outcome: Progressing     Problem: Psychotic Signs/Symptoms  Goal: Improved Behavioral Control (Psychotic Signs/Symptoms)  Outcome: Progressing       Priscilla Sutherland RN  1/10/2024  7:59 AM    "
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
## 2024-01-11 PROCEDURE — 124N000004

## 2024-01-11 PROCEDURE — 250N000013 HC RX MED GY IP 250 OP 250 PS 637

## 2024-01-11 PROCEDURE — 250N000013 HC RX MED GY IP 250 OP 250 PS 637: Performed by: NURSE PRACTITIONER

## 2024-01-11 PROCEDURE — 250N000013 HC RX MED GY IP 250 OP 250 PS 637: Performed by: STUDENT IN AN ORGANIZED HEALTH CARE EDUCATION/TRAINING PROGRAM

## 2024-01-11 PROCEDURE — 99232 SBSQ HOSP IP/OBS MODERATE 35: CPT | Mod: 95 | Performed by: STUDENT IN AN ORGANIZED HEALTH CARE EDUCATION/TRAINING PROGRAM

## 2024-01-11 RX ADMIN — Medication 50 MCG: at 09:05

## 2024-01-11 RX ADMIN — POLYETHYLENE GLYCOL 3350 17 G: 17 POWDER, FOR SOLUTION ORAL at 09:04

## 2024-01-11 RX ADMIN — SENNOSIDES AND DOCUSATE SODIUM 2 TABLET: 8.6; 5 TABLET ORAL at 09:05

## 2024-01-11 RX ADMIN — QUETIAPINE 300 MG: 100 TABLET ORAL at 20:20

## 2024-01-11 RX ADMIN — BUSPIRONE HYDROCHLORIDE 10 MG: 10 TABLET ORAL at 09:05

## 2024-01-11 RX ADMIN — HYDROXYZINE HYDROCHLORIDE 25 MG: 25 TABLET, FILM COATED ORAL at 09:08

## 2024-01-11 RX ADMIN — LURASIDONE HYDROCHLORIDE 60 MG: 20 TABLET, FILM COATED ORAL at 12:05

## 2024-01-11 RX ADMIN — BUSPIRONE HYDROCHLORIDE 10 MG: 10 TABLET ORAL at 20:20

## 2024-01-11 RX ADMIN — SENNOSIDES AND DOCUSATE SODIUM 2 TABLET: 8.6; 5 TABLET ORAL at 20:20

## 2024-01-11 ASSESSMENT — ACTIVITIES OF DAILY LIVING (ADL)
ADLS_ACUITY_SCORE: 31
ORAL_HYGIENE: INDEPENDENT
ADLS_ACUITY_SCORE: 31
ORAL_HYGIENE: INDEPENDENT
DRESS: INDEPENDENT;SCRUBS (BEHAVIORAL HEALTH)
LAUNDRY: UNABLE TO COMPLETE
ADLS_ACUITY_SCORE: 31
ADLS_ACUITY_SCORE: 31
HYGIENE/GROOMING: INDEPENDENT
LAUNDRY: UNABLE TO COMPLETE
DRESS: SCRUBS (BEHAVIORAL HEALTH);INDEPENDENT
ADLS_ACUITY_SCORE: 31
ADLS_ACUITY_SCORE: 31
HYGIENE/GROOMING: INDEPENDENT

## 2024-01-11 NOTE — PLAN OF CARE
Face to face report received from Lamonte ABBOTT. Pt. Observed.    Problem: Adult Behavioral Health Plan of Care  Goal: Patient-Specific Goal (Individualization)  Description: Pt. Will follow recommendations of treatment team during hospital stay.   Pt. Will attend  >50% of unit programing during hospital stay.   Pt. Will sleep 6-8 hours a night during hospital stay.  Pt. Will maintain ADLs without prompting during hospital stay.   Outcome: Progressing  Pt. Denies HI, SI, anxiety, depression, and pain. Pt. Cooperative with medications and nursing assessment. Pt. In agreement to update staff to thoughts feelings of wanting to harm self or others. Pt. Eating WDL. Pt. Offers no c/o issues with bowel and bladder.     Pt. Attending unit programming.    Face to face end of shift report communicated to oncbessy RN.     Duyen Garcia RN  1/11/2024  3:48 PM

## 2024-01-11 NOTE — PLAN OF CARE
The patient has a meeting/ interview with [facility] on Monday 1/15/2024 @ 10:00 am. The meeting will be on the ipad. SW emailed [facility] the instructions with ID and Pass code.
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
## 2024-01-11 NOTE — PLAN OF CARE
Face to face shift report received from nurse. Rounding completed, pt observed.    Problem: Adult Behavioral Health Plan of Care  Goal: Patient-Specific Goal (Individualization)  Description: Pt. Will follow recommendations of treatment team during hospital stay.   Pt. Will attend  >50% of unit programing during hospital stay.   Pt. Will sleep 6-8 hours a night during hospital stay.  Pt. Will maintain ADLs without prompting during hospital stay.   Outcome: Progressing  Pt has been in bed with eyes closed and regular respirations observed all night. Will continue to monitor. Patient slept 7 hours no issues noted.     Face to face report will be communicated to oncoming RN.    Lamotne Adrian RN  1/11/2024

## 2024-01-12 PROCEDURE — 250N000013 HC RX MED GY IP 250 OP 250 PS 637: Performed by: NURSE PRACTITIONER

## 2024-01-12 PROCEDURE — 124N000004

## 2024-01-12 PROCEDURE — 99232 SBSQ HOSP IP/OBS MODERATE 35: CPT | Mod: 95 | Performed by: STUDENT IN AN ORGANIZED HEALTH CARE EDUCATION/TRAINING PROGRAM

## 2024-01-12 PROCEDURE — 250N000013 HC RX MED GY IP 250 OP 250 PS 637

## 2024-01-12 PROCEDURE — 250N000013 HC RX MED GY IP 250 OP 250 PS 637: Performed by: STUDENT IN AN ORGANIZED HEALTH CARE EDUCATION/TRAINING PROGRAM

## 2024-01-12 RX ADMIN — SENNOSIDES AND DOCUSATE SODIUM 2 TABLET: 8.6; 5 TABLET ORAL at 08:53

## 2024-01-12 RX ADMIN — QUETIAPINE 300 MG: 100 TABLET ORAL at 20:53

## 2024-01-12 RX ADMIN — Medication 3 MG: at 23:12

## 2024-01-12 RX ADMIN — BUSPIRONE HYDROCHLORIDE 10 MG: 10 TABLET ORAL at 08:53

## 2024-01-12 RX ADMIN — Medication 50 MCG: at 08:53

## 2024-01-12 RX ADMIN — LURASIDONE HYDROCHLORIDE 60 MG: 20 TABLET, FILM COATED ORAL at 13:00

## 2024-01-12 RX ADMIN — SENNOSIDES AND DOCUSATE SODIUM 2 TABLET: 8.6; 5 TABLET ORAL at 20:53

## 2024-01-12 RX ADMIN — HYDROXYZINE HYDROCHLORIDE 25 MG: 25 TABLET, FILM COATED ORAL at 16:01

## 2024-01-12 RX ADMIN — HYDROXYZINE HYDROCHLORIDE 25 MG: 25 TABLET, FILM COATED ORAL at 20:53

## 2024-01-12 RX ADMIN — POLYETHYLENE GLYCOL 3350 17 G: 17 POWDER, FOR SOLUTION ORAL at 08:53

## 2024-01-12 RX ADMIN — BUSPIRONE HYDROCHLORIDE 10 MG: 10 TABLET ORAL at 20:53

## 2024-01-12 ASSESSMENT — ACTIVITIES OF DAILY LIVING (ADL)
ADLS_ACUITY_SCORE: 31
ADLS_ACUITY_SCORE: 31
DRESS: INDEPENDENT;SCRUBS (BEHAVIORAL HEALTH)
ADLS_ACUITY_SCORE: 31
ADLS_ACUITY_SCORE: 31
HYGIENE/GROOMING: INDEPENDENT
ORAL_HYGIENE: INDEPENDENT
ADLS_ACUITY_SCORE: 31
ADLS_ACUITY_SCORE: 31
LAUNDRY: UNABLE TO COMPLETE
ADLS_ACUITY_SCORE: 31
ORAL_HYGIENE: INDEPENDENT
ADLS_ACUITY_SCORE: 31
ADLS_ACUITY_SCORE: 31
LAUNDRY: UNABLE TO COMPLETE
ADLS_ACUITY_SCORE: 31
DRESS: SCRUBS (BEHAVIORAL HEALTH);INDEPENDENT
HYGIENE/GROOMING: INDEPENDENT
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
## 2024-01-12 NOTE — PROGRESS NOTES
The patient has a [facility] interview on Monday 1/15/2024 @ 10:00 am via ipad.     The patient has a [facility] [employer] I[facility] interview Monday at 1:00 pm via phone call.     SW updated nursing.
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
## 2024-01-12 NOTE — PLAN OF CARE
Face to face report received from Elyse ABBOTT. Pt. Observed.    Problem: Adult Behavioral Health Plan of Care  Goal: Patient-Specific Goal (Individualization)  Description: Pt. Will follow recommendations of treatment team during hospital stay.   Pt. Will attend  >50% of unit programing during hospital stay.   Pt. Will sleep 6-8 hours a night during hospital stay.  Pt. Will maintain ADLs without prompting during hospital stay.   Outcome: Progressing  Pt. Denies HI, SI, anxiety, depression, and pain. Pt. Out to lounge social with peers and staff. Pt. Cooperative with medications and nursing assessment. Pt. In agreement to update staff to thoughts feelings of wanting to harm self or others. Pt. Eating WDL. Pt. Offers no c/o issues with bowel and bladder.    Face to face end of shift report communicated to oncoming RN.     Duyen Garcia RN  1/12/2024  12:51 PM

## 2024-01-12 NOTE — PLAN OF CARE
Problem: Psychotic Signs/Symptoms  Goal: Improved Behavioral Control (Psychotic Signs/Symptoms)  Outcome: Progressing     Problem: Adult Behavioral Health Plan of Care  Goal: Patient-Specific Goal (Individualization)  Description: Pt. Will follow recommendations of treatment team during hospital stay.   Pt. Will attend  >50% of unit programing during hospital stay.   Pt. Will sleep 6-8 hours a night during hospital stay.  Pt. Will maintain ADLs without prompting during hospital stay.   Outcome: Progressing       Face to Face report received from MILENA Bennett.  Rounding completed. Patient observed in bed appearing to sleep for 5 hours.  Patient was free from falls or self-harm.  No PRNs requested.      Face to face end of shift report communicated to [facility] day shift RN.     Elyse Daniels RN  1/12/2024  1:28 AM

## 2024-01-13 PROCEDURE — 250N000013 HC RX MED GY IP 250 OP 250 PS 637: Performed by: NURSE PRACTITIONER

## 2024-01-13 PROCEDURE — 250N000013 HC RX MED GY IP 250 OP 250 PS 637: Performed by: STUDENT IN AN ORGANIZED HEALTH CARE EDUCATION/TRAINING PROGRAM

## 2024-01-13 PROCEDURE — 124N000004

## 2024-01-13 PROCEDURE — 99232 SBSQ HOSP IP/OBS MODERATE 35: CPT | Mod: 95 | Performed by: STUDENT IN AN ORGANIZED HEALTH CARE EDUCATION/TRAINING PROGRAM

## 2024-01-13 PROCEDURE — 250N000013 HC RX MED GY IP 250 OP 250 PS 637

## 2024-01-13 RX ADMIN — Medication 3 MG: at 20:08

## 2024-01-13 RX ADMIN — SENNOSIDES AND DOCUSATE SODIUM 2 TABLET: 8.6; 5 TABLET ORAL at 09:09

## 2024-01-13 RX ADMIN — HYDROXYZINE HYDROCHLORIDE 25 MG: 25 TABLET, FILM COATED ORAL at 18:00

## 2024-01-13 RX ADMIN — BUSPIRONE HYDROCHLORIDE 10 MG: 10 TABLET ORAL at 09:09

## 2024-01-13 RX ADMIN — QUETIAPINE 300 MG: 100 TABLET ORAL at 20:08

## 2024-01-13 RX ADMIN — SENNOSIDES AND DOCUSATE SODIUM 2 TABLET: 8.6; 5 TABLET ORAL at 20:08

## 2024-01-13 RX ADMIN — HYDROXYZINE HYDROCHLORIDE 25 MG: 25 TABLET, FILM COATED ORAL at 09:09

## 2024-01-13 RX ADMIN — ACETAMINOPHEN 650 MG: 325 TABLET, FILM COATED ORAL at 09:09

## 2024-01-13 RX ADMIN — Medication 50 MCG: at 09:09

## 2024-01-13 RX ADMIN — POLYETHYLENE GLYCOL 3350 17 G: 17 POWDER, FOR SOLUTION ORAL at 09:10

## 2024-01-13 RX ADMIN — LURASIDONE HYDROCHLORIDE 60 MG: 20 TABLET, FILM COATED ORAL at 12:23

## 2024-01-13 RX ADMIN — BUSPIRONE HYDROCHLORIDE 10 MG: 10 TABLET ORAL at 20:08

## 2024-01-13 ASSESSMENT — ACTIVITIES OF DAILY LIVING (ADL)
ADLS_ACUITY_SCORE: 31
ADLS_ACUITY_SCORE: 31
LAUNDRY: UNABLE TO COMPLETE
ADLS_ACUITY_SCORE: 31
HYGIENE/GROOMING: INDEPENDENT
ADLS_ACUITY_SCORE: 31
DRESS: SCRUBS (BEHAVIORAL HEALTH)
ORAL_HYGIENE: INDEPENDENT
ADLS_ACUITY_SCORE: 31
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
## 2024-01-13 NOTE — PLAN OF CARE
Problem: Adult Behavioral Health Plan of Care  Goal: Patient-Specific Goal (Individualization)  Description: Pt. Will follow recommendations of treatment team during hospital stay.   Pt. Will attend  >50% of unit programing during hospital stay.   Pt. Will sleep 6-8 hours a night during hospital stay.  Pt. Will maintain ADLs without prompting during hospital stay.   Outcome: Progressing    Pt observed sleeping in bed. 15 minute and PRN safety checks completed. Even respirations. No self harm or falls noted or reported this shift. Pt slept approximately 8 hours.     Face to face report will be communicated to oncoming RN.    Tracey Nieves RN  1/13/2024
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
## 2024-01-13 NOTE — PLAN OF CARE
Problem: Adult Behavioral Health Plan of Care  Goal: Patient-Specific Goal (Individualization)  Description: Pt. Will follow recommendations of treatment team during hospital stay.   Pt. Will attend  >50% of unit programing during hospital stay.   Pt. Will sleep 6-8 hours a night during hospital stay.  Pt. Will maintain ADLs without prompting during hospital stay.   Outcome: Progressing   Goal Outcome Evaluation:    Plan of Care Reviewed With: patient       Patient is alert, able to make needs known. VSS, afebrile. Assessment and vitals as charted. Denies pain. He is cooperative with nursing assessment.     Patient has been up in unit most of shift, social on the unit and attending groups. Reports anxiety improved. Denies SI, SIB, HI, or pain. It was his birthday today and he stated he made the best of it. Appetite good. Ate 100% of dinner. Took scheduled HS medications. Steady gait, no falls.

## 2024-01-14 PROCEDURE — 124N000004

## 2024-01-14 PROCEDURE — 99231 SBSQ HOSP IP/OBS SF/LOW 25: CPT | Mod: 95 | Performed by: STUDENT IN AN ORGANIZED HEALTH CARE EDUCATION/TRAINING PROGRAM

## 2024-01-14 PROCEDURE — 250N000013 HC RX MED GY IP 250 OP 250 PS 637: Performed by: NURSE PRACTITIONER

## 2024-01-14 PROCEDURE — 250N000013 HC RX MED GY IP 250 OP 250 PS 637

## 2024-01-14 PROCEDURE — 250N000013 HC RX MED GY IP 250 OP 250 PS 637: Performed by: STUDENT IN AN ORGANIZED HEALTH CARE EDUCATION/TRAINING PROGRAM

## 2024-01-14 RX ADMIN — SENNOSIDES AND DOCUSATE SODIUM 2 TABLET: 8.6; 5 TABLET ORAL at 20:09

## 2024-01-14 RX ADMIN — Medication 3 MG: at 20:09

## 2024-01-14 RX ADMIN — HYDROXYZINE HYDROCHLORIDE 25 MG: 25 TABLET, FILM COATED ORAL at 15:18

## 2024-01-14 RX ADMIN — NICOTINE POLACRILEX 2 MG: 2 GUM, CHEWING ORAL at 20:25

## 2024-01-14 RX ADMIN — BUSPIRONE HYDROCHLORIDE 10 MG: 10 TABLET ORAL at 20:09

## 2024-01-14 RX ADMIN — BUSPIRONE HYDROCHLORIDE 10 MG: 10 TABLET ORAL at 08:18

## 2024-01-14 RX ADMIN — LURASIDONE HYDROCHLORIDE 60 MG: 20 TABLET, FILM COATED ORAL at 12:42

## 2024-01-14 RX ADMIN — Medication 50 MCG: at 08:18

## 2024-01-14 RX ADMIN — ACETAMINOPHEN 650 MG: 325 TABLET, FILM COATED ORAL at 15:18

## 2024-01-14 RX ADMIN — SENNOSIDES AND DOCUSATE SODIUM 2 TABLET: 8.6; 5 TABLET ORAL at 08:18

## 2024-01-14 RX ADMIN — QUETIAPINE 300 MG: 100 TABLET ORAL at 20:09

## 2024-01-14 RX ADMIN — POLYETHYLENE GLYCOL 3350 17 G: 17 POWDER, FOR SOLUTION ORAL at 08:18

## 2024-01-14 ASSESSMENT — ACTIVITIES OF DAILY LIVING (ADL)
ADLS_ACUITY_SCORE: 31
ORAL_HYGIENE: INDEPENDENT
HYGIENE/GROOMING: INDEPENDENT
ADLS_ACUITY_SCORE: 31
DRESS: SCRUBS (BEHAVIORAL HEALTH)
ADLS_ACUITY_SCORE: 31
LAUNDRY: UNABLE TO COMPLETE
ADLS_ACUITY_SCORE: 31

## 2024-01-14 NOTE — PLAN OF CARE
Problem: Adult Behavioral Health Plan of Care  Goal: Patient-Specific Goal (Individualization)  Description: Pt. Will follow recommendations of treatment team during hospital stay.   Pt. Will attend  >50% of unit programing during hospital stay.   Pt. Will sleep 6-8 hours a night during hospital stay.  Pt. Will maintain ADLs without prompting during hospital stay.   Outcome: Progressing     Problem: Psychotic Signs/Symptoms  Goal: Improved Mood Symptoms (Psychotic Signs/Symptoms)  Outcome: Progressing    Face to face shift report received from previously assigned nurse. Rounding completed, pt observed in the lounge, sitting at the table eating popcorn.    Patient has been out in the lounge and social with peers. Patient requested to try on the glasses for patients in the group room, patient found a pair that he was comfortable using for temporary.     Patient is met with in the hallway, denies pain, SI, HI, and A/V hallucinations. Patient endorses some slight anxiety and depression, though is cheerful in interactions. Patient is still looking forward to the interviews tomorrow for the IRTS programs, noting that he is hopeful to be accepted into those programs. No further concerns at this time.     Patient was compliant with HS medication administration. PRN melatonin also given at this time.     Face to face report communicated to oncbessy ABBOTT.    Aracely Hicks RN  1/14/2024  4:18 PM

## 2024-01-14 NOTE — PLAN OF CARE
Problem: Adult Behavioral Health Plan of Care  Goal: Patient-Specific Goal (Individualization)  Description: Pt. Will follow recommendations of treatment team during hospital stay.   Pt. Will attend  >50% of unit programing during hospital stay.   Pt. Will sleep 6-8 hours a night during hospital stay.  Pt. Will maintain ADLs without prompting during hospital stay.   Outcome: Progressing     Problem: Psychotic Signs/Symptoms  Goal: Optimal Cognitive Function (Psychotic Signs/Symptoms)  Description: Pt will be free from psychotic symptoms prior to discharge  Outcome: Progressing  Goal: Improved Mood Symptoms (Psychotic Signs/Symptoms)  Outcome: Progressing    Face to face shift report received from previously assigned nurse. Rounding completed, pt observed in his room listening to music.    Patient is met with in the lounge. Denies current pain, SI, HI, and A/V hallucinations. Patient endorses that depression and anxiety are at an 8/10. Requested and received PRN atarax. Patient has been in the lounge a majority of the shift. Patient is pleasant in conversation, maintains appropriate boundaries.    Patient was compliant with HS medication administration, requested and received PRN melatonin.    Face to face report communicated to oncoming RN.    Aracely Hicks RN  1/13/2024  6:03 PM

## 2024-01-14 NOTE — PLAN OF CARE
Problem: Adult Behavioral Health Plan of Care  Goal: Patient-Specific Goal (Individualization)  Description: Pt. Will follow recommendations of treatment team during hospital stay.   Pt. Will attend  >50% of unit programing during hospital stay.   Pt. Will sleep 6-8 hours a night during hospital stay.  Pt. Will maintain ADLs without prompting during hospital stay.   Outcome: Progressing     Problem: Psychotic Signs/Symptoms  Goal: Improved Behavioral Control (Psychotic Signs/Symptoms)  Outcome: Progressing     Face to face shift report received from Aracely. Rounding completed, patient laying in bed, appeared to be sleeping, non-labored even respirations noted.    Patient appeared to be sleeping for approximately 8.5 hours since 2130 last shift.    Face to face report will be communicated to oncoming RN.    Aniya Willard RN  1/14/2024  6:07 AM
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
## 2024-01-14 NOTE — PLAN OF CARE
"Face to face shift report received from Aniya ALCOCER RN.       Problem: Adult Behavioral Health Plan of Care  Goal: Patient-Specific Goal (Individualization)  Description: Pt. Will follow recommendations of treatment team during hospital stay.   Pt. Will attend  >50% of unit programing during hospital stay.   Pt. Will sleep 6-8 hours a night during hospital stay.  Pt. Will maintain ADLs without prompting during hospital stay.   Outcome: Progressing   Increased self talk this shift, example: during breakfast pt making the statement \"you're a blueberry muffin and I am going to eat you.\" Elated mood. Pt reports \"today is a righteous day!\" Pt reports \"I feel much better. I think I am ready to go. I don't want to go back to the [facility]. I want to be a resident of [facility].\" No reports of pain.   Pt requested 12 hour intent due to \"I'll  my pills and go to the homeless shelter in [facility]. They will feed me and make sure I get my pills.\" Writer informed pt that they are unsure if these services are offered at a \"homeless shelter in [facility] [place].\" Pt then stated \"I guess I should stay and talk to Asad and Dr San tomorrow then? I'll stay until tomorrow. Maybe I will stay a full 14 days.\"     Problem: Psychotic Signs/Symptoms  Goal: Improved Behavioral Control (Psychotic Signs/Symptoms)  Outcome: Progressing       Face to face end of shift report to be communicated to oncoming RN.       "

## 2024-01-15 PROCEDURE — 124N000004

## 2024-01-15 PROCEDURE — 250N000013 HC RX MED GY IP 250 OP 250 PS 637: Performed by: NURSE PRACTITIONER

## 2024-01-15 PROCEDURE — 250N000013 HC RX MED GY IP 250 OP 250 PS 637

## 2024-01-15 PROCEDURE — 99232 SBSQ HOSP IP/OBS MODERATE 35: CPT

## 2024-01-15 PROCEDURE — 250N000013 HC RX MED GY IP 250 OP 250 PS 637: Performed by: STUDENT IN AN ORGANIZED HEALTH CARE EDUCATION/TRAINING PROGRAM

## 2024-01-15 RX ADMIN — NICOTINE POLACRILEX 2 MG: 2 GUM, CHEWING ORAL at 09:49

## 2024-01-15 RX ADMIN — Medication 3 MG: at 20:29

## 2024-01-15 RX ADMIN — QUETIAPINE 300 MG: 100 TABLET ORAL at 20:29

## 2024-01-15 RX ADMIN — HYDROXYZINE HYDROCHLORIDE 25 MG: 25 TABLET, FILM COATED ORAL at 08:45

## 2024-01-15 RX ADMIN — BUSPIRONE HYDROCHLORIDE 10 MG: 10 TABLET ORAL at 20:29

## 2024-01-15 RX ADMIN — NICOTINE POLACRILEX 2 MG: 2 GUM, CHEWING ORAL at 13:39

## 2024-01-15 RX ADMIN — SENNOSIDES AND DOCUSATE SODIUM 2 TABLET: 8.6; 5 TABLET ORAL at 08:36

## 2024-01-15 RX ADMIN — Medication 50 MCG: at 08:36

## 2024-01-15 RX ADMIN — POLYETHYLENE GLYCOL 3350 17 G: 17 POWDER, FOR SOLUTION ORAL at 08:36

## 2024-01-15 RX ADMIN — BUSPIRONE HYDROCHLORIDE 10 MG: 10 TABLET ORAL at 08:36

## 2024-01-15 RX ADMIN — SENNOSIDES AND DOCUSATE SODIUM 2 TABLET: 8.6; 5 TABLET ORAL at 20:29

## 2024-01-15 RX ADMIN — NICOTINE POLACRILEX 2 MG: 2 GUM, CHEWING ORAL at 17:55

## 2024-01-15 RX ADMIN — ACETAMINOPHEN 650 MG: 325 TABLET, FILM COATED ORAL at 08:45

## 2024-01-15 RX ADMIN — LURASIDONE HYDROCHLORIDE 60 MG: 20 TABLET, FILM COATED ORAL at 12:47

## 2024-01-15 ASSESSMENT — ACTIVITIES OF DAILY LIVING (ADL)
ADLS_ACUITY_SCORE: 31
HYGIENE/GROOMING: INDEPENDENT
ADLS_ACUITY_SCORE: 31
DRESS: SCRUBS (BEHAVIORAL HEALTH)
LAUNDRY: UNABLE TO COMPLETE
ORAL_HYGIENE: INDEPENDENT

## 2024-01-15 NOTE — PROGRESS NOTES
The patient had his [facility] Interview at 10:00 am. The Interview lasted until 11:43 am.     Jaja with [facility] emailed intake forms and ROIs. The patient signed the ROIs and SW emailed them back to Jaja.     The patient had his interview with [facility]. Patient told KELLY he did not have a good feeling of how he thought the interview went.

## 2024-01-15 NOTE — PLAN OF CARE
Problem: Adult Behavioral Health Plan of Care  Goal: Patient-Specific Goal (Individualization)  Description: Pt. Will follow recommendations of treatment team during hospital stay.   Pt. Will attend  >50% of unit programing during hospital stay.   Pt. Will sleep 6-8 hours a night during hospital stay.  Pt. Will maintain ADLs without prompting during hospital stay.   Outcome: Progressing     Problem: Psychotic Signs/Symptoms  Goal: Optimal Cognitive Function (Psychotic Signs/Symptoms)  Description: Pt will be free from psychotic symptoms prior to discharge  Outcome: Progressing  Goal: Improved Mood Symptoms (Psychotic Signs/Symptoms)  Outcome: Progressing    Face to face shift report received from previously assigned nurse. Rounding completed, pt observed out in the lounge, social with peers.    Patient is met with in the hallway, patient requesting a new set of headphones to listen to music. Patient is pleasant and bright if affect. Denies pain, SI, HI, and A/V hallucinations. Patient endorses some anxiety and depression, although states it is tolerable at this time and declines PRN interventions. No further needs at this time.    Patient has continued to be pleasant in interactions and social with peers. Patient appears to be responding to internal stimuli when alone, however continues to deny all hallucinations.     Patient was compliant with HS medication administration, PRN melatonin given at this time.     Face to face report communicated to oncoming MILENA.    Aracely Hicks RN  1/15/2024  3:28 PM
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
## 2024-01-15 NOTE — PLAN OF CARE
Problem: Adult Behavioral Health Plan of Care  Goal: Patient-Specific Goal (Individualization)  Description: Pt. Will follow recommendations of treatment team during hospital stay.   Pt. Will attend  >50% of unit programing during hospital stay.   Pt. Will sleep 6-8 hours a night during hospital stay.  Pt. Will maintain ADLs without prompting during hospital stay.   Outcome: Progressing     Problem: Psychotic Signs/Symptoms  Goal: Improved Behavioral Control (Psychotic Signs/Symptoms)  Outcome: Progressing     Face to face shift report received from Aracely. Rounding completed, patient laying in bed, appeared to be sleeping, non-labored even respirations noted.    Patient appeared to be sleeping for approximately 7.25 hours since 2245 last shift.      Face to face report will be communicated to oncoming RN.    Aniya Willard RN  1/15/2024  6:27 AM

## 2024-01-16 PROCEDURE — 99232 SBSQ HOSP IP/OBS MODERATE 35: CPT

## 2024-01-16 PROCEDURE — 250N000013 HC RX MED GY IP 250 OP 250 PS 637: Performed by: NURSE PRACTITIONER

## 2024-01-16 PROCEDURE — 250N000013 HC RX MED GY IP 250 OP 250 PS 637

## 2024-01-16 PROCEDURE — 124N000004

## 2024-01-16 PROCEDURE — 250N000013 HC RX MED GY IP 250 OP 250 PS 637: Performed by: STUDENT IN AN ORGANIZED HEALTH CARE EDUCATION/TRAINING PROGRAM

## 2024-01-16 RX ADMIN — SENNOSIDES AND DOCUSATE SODIUM 2 TABLET: 8.6; 5 TABLET ORAL at 08:28

## 2024-01-16 RX ADMIN — HYDROXYZINE HYDROCHLORIDE 25 MG: 25 TABLET, FILM COATED ORAL at 09:00

## 2024-01-16 RX ADMIN — POLYETHYLENE GLYCOL 3350 17 G: 17 POWDER, FOR SOLUTION ORAL at 08:27

## 2024-01-16 RX ADMIN — NICOTINE POLACRILEX 2 MG: 2 GUM, CHEWING ORAL at 12:21

## 2024-01-16 RX ADMIN — SENNOSIDES AND DOCUSATE SODIUM 2 TABLET: 8.6; 5 TABLET ORAL at 20:20

## 2024-01-16 RX ADMIN — QUETIAPINE 300 MG: 100 TABLET ORAL at 20:20

## 2024-01-16 RX ADMIN — ACETAMINOPHEN 650 MG: 325 TABLET, FILM COATED ORAL at 09:00

## 2024-01-16 RX ADMIN — BUSPIRONE HYDROCHLORIDE 10 MG: 10 TABLET ORAL at 20:20

## 2024-01-16 RX ADMIN — NICOTINE POLACRILEX 2 MG: 2 GUM, CHEWING ORAL at 09:21

## 2024-01-16 RX ADMIN — NICOTINE POLACRILEX 2 MG: 2 GUM, CHEWING ORAL at 20:32

## 2024-01-16 RX ADMIN — LURASIDONE HYDROCHLORIDE 60 MG: 20 TABLET, FILM COATED ORAL at 12:18

## 2024-01-16 RX ADMIN — Medication 50 MCG: at 08:27

## 2024-01-16 RX ADMIN — BUSPIRONE HYDROCHLORIDE 10 MG: 10 TABLET ORAL at 08:28

## 2024-01-16 RX ADMIN — NICOTINE POLACRILEX 2 MG: 2 GUM, CHEWING ORAL at 16:27

## 2024-01-16 ASSESSMENT — ACTIVITIES OF DAILY LIVING (ADL)
ADLS_ACUITY_SCORE: 31
LAUNDRY: UNABLE TO COMPLETE
ORAL_HYGIENE: INDEPENDENT
ADLS_ACUITY_SCORE: 31
HYGIENE/GROOMING: INDEPENDENT
ADLS_ACUITY_SCORE: 31
DRESS: INDEPENDENT
ADLS_ACUITY_SCORE: 31
LAUNDRY: UNABLE TO COMPLETE
ADLS_ACUITY_SCORE: 31
ADLS_ACUITY_SCORE: 31
ORAL_HYGIENE: INDEPENDENT
ADLS_ACUITY_SCORE: 31
HYGIENE/GROOMING: INDEPENDENT
DRESS: SCRUBS (BEHAVIORAL HEALTH);INDEPENDENT
ADLS_ACUITY_SCORE: 31
ADLS_ACUITY_SCORE: 31
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
## 2024-01-16 NOTE — PLAN OF CARE
Face to face end of shift report received from Shailesh CLAIRE RN. Rounding completed. Patient observed in [facility].     Pt has been calm, cooperative this shift. He participates in the unit milieu with appropriate behaviors. He denied any SI/HI and AH/VH. He does appear more guarded and paranoid today than previous shifts. He has attended groups. He reported feeling some chest discomfort and requested Tylenol for this. Pt also requested Hydroxyzine 25 mg for anxiety. He stated he feels that the anxiety makes his muscles tense and that is why he feels the chest discomfort. He reported adequate results with both PRNs. He is able to make his needs known. Steady gait- no falls. Frequent rounding.     Problem: Adult Behavioral Health Plan of Care  Goal: Patient-Specific Goal (Individualization)  Description: Pt. Will follow recommendations of treatment team during hospital stay.   Pt. Will attend  >50% of unit programing during hospital stay.   Pt. Will sleep 6-8 hours a night during hospital stay.  Pt. Will maintain ADLs without prompting during hospital stay.   Outcome: Progressing       Problem: Psychotic Signs/Symptoms  Goal: Improved Behavioral Control (Psychotic Signs/Symptoms)  Outcome: Progressing     Priscilla Sutherland RN  1/16/2024  1:43 PM

## 2024-01-16 NOTE — PLAN OF CARE
Problem: Adult Behavioral Health Plan of Care  Goal: Patient-Specific Goal (Individualization)  Description: Pt. Will follow recommendations of treatment team during hospital stay.   Pt. Will attend  >50% of unit programing during hospital stay.   Pt. Will sleep 6-8 hours a night during hospital stay.  Pt. Will maintain ADLs without prompting during hospital stay.   Outcome: Progressing     Problem: Psychotic Signs/Symptoms  Goal: Improved Behavioral Control (Psychotic Signs/Symptoms)  Outcome: Progressing     Report received from previously assigned nurse.     Rounds completed. Safety checks completed every 15 minutes throughout the night. Pt noted to be resting with eyes closed and easy resp. for 6 hours. No suicidal gestures or comments noted.    Face to face end of shift report to be communicated to oncoming RN.

## 2024-01-16 NOTE — PROGRESS NOTES
"[facility] PSYCHIATRY  PROGRESS NOTE     SUBJECTIVE     Prior to interviewing the patient, I met with nursing and reviewed patient's clinical condition. We discussed clinical care both before and after the interview. I have reviewed the patient's clinical course by review of records including previous notes, labs, and vital signs.     Per nursing, the patient had the following behavioral events over the last 24-hours: None. Slept overnight. Taking medications as prescribed.     Upon psychiatric interview, patient was in the lounge and asks to speak to me. He was interacting with a new patient, whom he tells me he is not fond of. Pt tells me the new pt appears to be intentionally invasive. We discussed setting boundaries, which pt states he has started with this peer. Pt states his anxiety and depression are \"good\"  No delusions elicited. Denies AVH. He is looking forward to hearing back from [facility] about admission.     He has no acute concerns today. Denies any noted adverse effects from his medications.        MEDICATIONS   Scheduled Meds:   busPIRone  10 mg Oral BID    lurasidone  60 mg Oral Daily with lunch    polyethylene glycol  17 g Oral Daily    QUEtiapine  300 mg Oral At Bedtime    senna-docusate  2 tablet Oral BID    Vitamin D3  50 mcg Oral Daily     PRN Meds:.acetaminophen, albuterol, alum & mag hydroxide-simethicone, hydrOXYzine HCl, melatonin, nicotine, OLANZapine **OR** OLANZapine     ALLERGIES   No Known Allergies     MENTAL STATUS EXAM   Vitals: /88   Pulse 92   Temp (!) 96.3  F (35.7  C) (Tympanic)   Resp 18   Wt 139.1 kg (306 lb 9.6 oz)   SpO2 99%   BMI 41.58 kg/m      Appearance:  awake, alert, adequately groomed, dressed in hospital scrubs, and appeared as age stated  Attitude:  cooperative  Eye Contact:  good, reduced blinking  Mood:  Good  Affect:  mood congruent, more mobile, smiles in conversation  Speech:  clear, coherent  Psychomotor Behavior:  no evidence of " tardive dyskinesia, dystonia, or tics. No akathisia.  Thought Process:   concrete  Associations:  no loose associations  Thought Content:  Denies SI or HI. AH at times, improved from prior  Insight: Fair  Judgment: Fair  Oriented to:  time, person, and place  Attention Span and Concentration:  intact  Recent and Remote Memory:  intact  Language: English with appropriate syntax and vocabulary  Fund of Knowledge: low-normal  Muscle Strength and Tone: normal  Gait and Station: Normal       LABS   No results found for this or any previous visit (from the past 24 hour(s)).      IMPRESSION     This is a 33 year old male with a PMH of SZAD, bipolar type, ASD, ARETHA, ADHD MDD, cannabis and ETOH use with psychotic features who presented to [place], [place] via ambulance 1/1/23 with sx of psychosis possibly from medication non-compliance. It appears he had driven to the [facility] by mistake when he was going to [place], [place] to visit someone he had met from a previous admission. Pt impulsively had driven north without this other person being aware he was planning on visiting them. When he mistakenly reached the border, he turned around. Apparently this activity was noted to be suspicious by law enforcement and was pursued and ultimately required disabling the vehicle to get the pt to stop. He was repeating that he was a prisoner of war and was brought to the local ED, where he was evaluated and referred to  psychiatric stabilization. Pt was recently at [facility] from 12/10/23-12/27/23 for psychosis and SI where he was placed on a commitment and [place]. He had recently been stabilized on Seroquel and Latuda and was discharged home, where it is likely he stopped taking his medication.      During this current presentation, it appears that this episode is influenced by medication noncompliance in context of SZAD, bipolar type. A possible complicating factor is is neurodevelopmental diagnosis as well as  psychosocial stressors, including homelessness and recent loss of transportation. Social support may also be contributing but his situation is likely multifactorial. Due to the above presentation, pt was admitted for [facility] for further safety and stabilization.     Today: The patient mood and psychosis has stabilized with treatment. Interviewing for IRTS facilities. Tried to help obtain glasses but need to do outpatient due to not feasible.       DIAGNOSES     SZAD, bipolar type, multiple episodes, currently in acute episode  2.   ASD  3.   ADHD  4.   ARETHA       PLAN     Location: Unit 5  Legal Status: Orders Placed This Encounter      Voluntary    Commitment: In effect through [facility], admitted voluntary though  Mckeon: In effect, medications - Haldol, Zyprexa, Seroquel, Latuda, Invega     Safety Assessment:    Behavioral Orders   Procedures    Code 1 - Restrict to Unit    Routine Programming     As clinically indicated    Status 15     Every 15 minutes.      PTA psychotropic medications held:      -none     PTA psychotropic medications continued/changed:      -BuSpar 5 mg BID -> 10 mg BID   -Latuda 40 mg daily -> 60 mg daily on 1/7  -Seroquel 300 mg at bedtime  -hydroxyzine 25 mg prn     New medications initiated:      -None    Today's Changes:    -None    Programming: Patient will be treated in a therapeutic milieu with appropriate individual and group therapies. Education will be provided on diagnoses, medications, and treatments.     Medical diagnoses:  Per medicine    #. Myopia  - Patient found prescription  - Patient to obtain outpatient due to unable to obtain here    Consult: None  Tests: None. EKG wnl.     Anticipated LOS: > 5 days  Disposition: IRTS         ATTESTATION      AUDIE Aden CNP

## 2024-01-16 NOTE — PLAN OF CARE
Problem: Adult Behavioral Health Plan of Care  Goal: Patient-Specific Goal (Individualization)  Description: Pt. Will follow recommendations of treatment team during hospital stay.   Pt. Will attend  >50% of unit programing during hospital stay.   Pt. Will sleep 6-8 hours a night during hospital stay.  Pt. Will maintain ADLs without prompting during hospital stay.   Outcome: Progressing   Goal Outcome Evaluation:

## 2024-01-17 PROCEDURE — 124N000004

## 2024-01-17 PROCEDURE — 250N000013 HC RX MED GY IP 250 OP 250 PS 637: Performed by: NURSE PRACTITIONER

## 2024-01-17 PROCEDURE — 250N000013 HC RX MED GY IP 250 OP 250 PS 637

## 2024-01-17 PROCEDURE — 99232 SBSQ HOSP IP/OBS MODERATE 35: CPT

## 2024-01-17 PROCEDURE — 250N000013 HC RX MED GY IP 250 OP 250 PS 637: Performed by: STUDENT IN AN ORGANIZED HEALTH CARE EDUCATION/TRAINING PROGRAM

## 2024-01-17 PROCEDURE — 250N000009 HC RX 250

## 2024-01-17 RX ADMIN — HYDROXYZINE HYDROCHLORIDE 25 MG: 25 TABLET, FILM COATED ORAL at 07:26

## 2024-01-17 RX ADMIN — NICOTINE POLACRILEX 2 MG: 2 GUM, CHEWING ORAL at 08:24

## 2024-01-17 RX ADMIN — SENNOSIDES AND DOCUSATE SODIUM 2 TABLET: 8.6; 5 TABLET ORAL at 21:14

## 2024-01-17 RX ADMIN — BUSPIRONE HYDROCHLORIDE 10 MG: 10 TABLET ORAL at 08:24

## 2024-01-17 RX ADMIN — QUETIAPINE 300 MG: 100 TABLET ORAL at 21:14

## 2024-01-17 RX ADMIN — ACETAMINOPHEN 650 MG: 325 TABLET, FILM COATED ORAL at 07:26

## 2024-01-17 RX ADMIN — LURASIDONE HYDROCHLORIDE 60 MG: 20 TABLET, FILM COATED ORAL at 12:20

## 2024-01-17 RX ADMIN — Medication 50 MCG: at 08:24

## 2024-01-17 RX ADMIN — SENNOSIDES AND DOCUSATE SODIUM 2 TABLET: 8.6; 5 TABLET ORAL at 08:24

## 2024-01-17 RX ADMIN — TUBERCULIN PURIFIED PROTEIN DERIVATIVE 5 UNITS: 5 INJECTION, SOLUTION INTRADERMAL at 11:13

## 2024-01-17 RX ADMIN — POLYETHYLENE GLYCOL 3350 17 G: 17 POWDER, FOR SOLUTION ORAL at 08:24

## 2024-01-17 RX ADMIN — BUSPIRONE HYDROCHLORIDE 10 MG: 10 TABLET ORAL at 21:14

## 2024-01-17 ASSESSMENT — ACTIVITIES OF DAILY LIVING (ADL)
ORAL_HYGIENE: INDEPENDENT
HYGIENE/GROOMING: INDEPENDENT
ADLS_ACUITY_SCORE: 31
DRESS: INDEPENDENT
HYGIENE/GROOMING: INDEPENDENT
ADLS_ACUITY_SCORE: 31
ADLS_ACUITY_SCORE: 31
LAUNDRY: UNABLE TO COMPLETE
ADLS_ACUITY_SCORE: 31
LAUNDRY: UNABLE TO COMPLETE
ORAL_HYGIENE: INDEPENDENT
ADLS_ACUITY_SCORE: 31
DRESS: SCRUBS (BEHAVIORAL HEALTH);INDEPENDENT

## 2024-01-17 NOTE — PROGRESS NOTES
KELLY received an email back from Jaja from [facility] stating that the patient could possibly be admitted either next Thursday or Friday.

## 2024-01-17 NOTE — PROGRESS NOTES
The patient was accepted to [facility] for 1/26/24. They would like him there by 10:30 am.     [facility] emailed SW back. They use the following Pharmacy:    Pharmacy: [facility] - [address], [place] [address].    They also will scheduled all the patient's outpatient appointments themselves.

## 2024-01-17 NOTE — PROGRESS NOTES
"[facility] PSYCHIATRY  PROGRESS NOTE     SUBJECTIVE     Prior to interviewing the patient, I met with nursing and reviewed patient's clinical condition. We discussed clinical care both before and after the interview. I have reviewed the patient's clinical course by review of records including previous notes, labs, and vital signs.     Per nursing, the patient had the following behavioral events over the last 24-hours: became agitated after verbal altercation with roommate, was able to deescalate without medication intervention. Slept overnight. Taking medications as prescribed.     Upon psychiatric interview, patient was in the lounge. We moved to the report room for privacy. Pt tells me he has been \"off\" d/t interactions with the last 2 roommates. He states \"I won't be discriminated against\" and states he had an argument with the last roommate. He states that he and the roommate talked after the argument and both apologized. He states the issue has been resolved. He states the previous roommate was difficult to stay with . He tells me that these events have caused stress for him.     I did offer to titrate his medications. He did become expressionless and asked that we \"move on\" from this subject. He did state \"I'm fine, please stop asking me 20 questions about this. I was stressed from an augment. I am just fine\"    He has no acute concerns today. Denies any noted adverse effects from his medications.        MEDICATIONS   Scheduled Meds:   [START ON 1/19/2024] - Skin Test Reading (tuberculin) -   Does not apply Once    busPIRone  10 mg Oral BID    lurasidone  60 mg Oral Daily with lunch    nicotine  1 patch Transdermal Daily    nicotine   Transdermal Q8H    polyethylene glycol  17 g Oral Daily    QUEtiapine  300 mg Oral At Bedtime    senna-docusate  2 tablet Oral BID    Vitamin D3  50 mcg Oral Daily     PRN Meds:.acetaminophen, albuterol, alum & mag hydroxide-simethicone, hydrOXYzine HCl, melatonin, nicotine, " "OLANZapine **OR** OLANZapine     ALLERGIES   No Known Allergies     MENTAL STATUS EXAM   Vitals: /71 (BP Location: Right arm)   Pulse 86   Temp 97.2  F (36.2  C) (Tympanic)   Resp 16   Wt 139.1 kg (306 lb 9.6 oz)   SpO2 96%   BMI 41.58 kg/m      Appearance:  awake, alert, adequately groomed, dressed in hospital scrubs, and appeared as age stated  Attitude:  cooperative  Eye Contact:  good, reduced blinking  Mood:  \"not bad, things are looking up\"  Affect:  mood congruent, more mobile, smiles in conversation  Speech:  clear, coherent  Psychomotor Behavior:  no evidence of tardive dyskinesia, dystonia, or tics. No akathisia.  Thought Process:   concrete  Associations:  no loose associations  Thought Content:  Denies SI or HI. AH at times, improved from prior.   Insight: Fair  Judgment: Fair  Oriented to:  time, person, and place  Attention Span and Concentration:  intact  Recent and Remote Memory:  intact  Language: English with appropriate syntax and vocabulary  Fund of Knowledge: low-normal  Muscle Strength and Tone: normal  Gait and Station: Normal       LABS   No results found for this or any previous visit (from the past 24 hour(s)).      IMPRESSION     This is a 33 year old male with a PMH of SZAD, bipolar type, ASD, ARETHA, ADHD MDD, cannabis and ETOH use with psychotic features who presented to [place], [place] via ambulance 1/1/23 with sx of psychosis possibly from medication non-compliance. It appears he had driven to the [facility] by mistake when he was going to [place], [place] to visit someone he had met from a previous admission. Pt impulsively had driven north without this other person being aware he was planning on visiting them. When he mistakenly reached the border, he turned around. Apparently this activity was noted to be suspicious by law enforcement and was pursued and ultimately required disabling the vehicle to get the pt to stop. He was repeating that he was a " prisoner of war and was brought to the local ED, where he was evaluated and referred to  psychiatric stabilization. Pt was recently at [facility] from 12/10/23-12/27/23 for psychosis and SI where he was placed on a commitment and [place]. He had recently been stabilized on Seroquel and Latuda and was discharged home, where it is likely he stopped taking his medication.      During this current presentation, it appears that this episode is influenced by medication noncompliance in context of SZAD, bipolar type. A possible complicating factor is is neurodevelopmental diagnosis as well as psychosocial stressors, including homelessness and recent loss of transportation. Social support may also be contributing but his situation is likely multifactorial. Due to the above presentation, pt was admitted for [facility] for further safety and stabilization.     Today: The patient mood and psychosis has stabilized with treatment. Interviewing for IRTS facilities. Tried to help obtain glasses but need to do outpatient due to not feasible. Some irritability last tex with a verbal argument with roommate, states this is affecting his mood. Will adjust medications as necessary if this continues.        DIAGNOSES     SZAD, bipolar type, multiple episodes, currently in acute episode  2.   ASD  3.   ADHD  4.   ARETHA       PLAN     Location: Unit 5  Legal Status: Orders Placed This Encounter      Voluntary    Commitment: In effect through [facility], admitted voluntary though  Mckeon: In effect, medications - Haldol, Zyprexa, Seroquel, Latuda, Invega     Safety Assessment:    Behavioral Orders   Procedures    Code 1 - Restrict to Unit    Routine Programming     As clinically indicated    Status 15     Every 15 minutes.      PTA psychotropic medications held:      -none     PTA psychotropic medications continued/changed:      -BuSpar 5 mg BID -> 10 mg BID   -Latuda 40 mg daily -> 60 mg daily on  1/7  -Seroquel 300 mg at bedtime  -hydroxyzine 25 mg prn     New medications initiated:      -None    Today's Changes:    -None    Programming: Patient will be treated in a therapeutic milieu with appropriate individual and group therapies. Education will be provided on diagnoses, medications, and treatments.     Medical diagnoses:  Per medicine    #. Myopia  - Patient found prescription  - Patient to obtain outpatient due to unable to obtain here    Consult: None  Tests: None. EKG wnl.     Anticipated LOS: > 5 days  Disposition: IRTS         ATTESTATION      AUDIE Aden CNP

## 2024-01-17 NOTE — PLAN OF CARE
Problem: Adult Behavioral Health Plan of Care  Goal: Patient-Specific Goal (Individualization)  Description: Pt. Will follow recommendations of treatment team during hospital stay.   Pt. Will attend  >50% of unit programing during hospital stay.   Pt. Will sleep 6-8 hours a night during hospital stay.  Pt. Will maintain ADLs without prompting during hospital stay.   Outcome: Progressing       Problem: Psychotic Signs/Symptoms  Goal: Optimal Cognitive Function (Psychotic Signs/Symptoms)  Description: Pt will be free from psychotic symptoms prior to discharge  Outcome: Progressing     Goal Outcome Evaluation:    Pt has been in group or lounge most of the shift. Pt states he is good today and denies pain, anxiety, depression, SI, HI and hallucinations. Pt ate dinner in the lounge and snack in group.   Pt stayed in group most of the evening, played guitar.           Plan of Care Reviewed With: patient

## 2024-01-17 NOTE — PLAN OF CARE
Face to face end of shift report received from Emiyl WARD RN. Rounding completed. Patient observed in [facility].     Pt has been calm, cooperative this shift. He appears guarded and paranoid. He denied any SI/HI and AH/VH. Reported pain and received Tylenol with adequate results. Pt received a Mantoux test for future placement at . He was very paranoid about this and thought it was because he had an abnormal chest x-ray. Educated pt regarding and assured this was a prerequisite to getting accepted at . He has attended groups throughout the day. He is social with peers. He is able to make his needs known. Steady gait- no falls. Frequent rounding.      Problem: Adult Behavioral Health Plan of Care  Goal: Patient-Specific Goal (Individualization)  Description: Pt. Will follow recommendations of treatment team during hospital stay.   Pt. Will attend  >50% of unit programing during hospital stay.   Pt. Will sleep 6-8 hours a night during hospital stay.  Pt. Will maintain ADLs without prompting during hospital stay.   Outcome: Progressing     Problem: Psychotic Signs/Symptoms  Goal: Improved Behavioral Control (Psychotic Signs/Symptoms)  Outcome: Progressing       Priscilla Sutherland RN  1/17/2024  2:49 PM

## 2024-01-18 PROCEDURE — 90686 IIV4 VACC NO PRSV 0.5 ML IM: CPT

## 2024-01-18 PROCEDURE — 99232 SBSQ HOSP IP/OBS MODERATE 35: CPT

## 2024-01-18 PROCEDURE — 250N000013 HC RX MED GY IP 250 OP 250 PS 637: Performed by: NURSE PRACTITIONER

## 2024-01-18 PROCEDURE — 124N000004

## 2024-01-18 PROCEDURE — 250N000011 HC RX IP 250 OP 636

## 2024-01-18 PROCEDURE — 250N000013 HC RX MED GY IP 250 OP 250 PS 637

## 2024-01-18 PROCEDURE — 250N000013 HC RX MED GY IP 250 OP 250 PS 637: Performed by: STUDENT IN AN ORGANIZED HEALTH CARE EDUCATION/TRAINING PROGRAM

## 2024-01-18 PROCEDURE — G0008 ADMIN INFLUENZA VIRUS VAC: HCPCS

## 2024-01-18 RX ADMIN — SENNOSIDES AND DOCUSATE SODIUM 2 TABLET: 8.6; 5 TABLET ORAL at 08:38

## 2024-01-18 RX ADMIN — LURASIDONE HYDROCHLORIDE 60 MG: 20 TABLET, FILM COATED ORAL at 12:36

## 2024-01-18 RX ADMIN — QUETIAPINE 300 MG: 100 TABLET ORAL at 20:47

## 2024-01-18 RX ADMIN — INFLUENZA A VIRUS A/VICTORIA/4897/2022 IVR-238 (H1N1) ANTIGEN (FORMALDEHYDE INACTIVATED), INFLUENZA A VIRUS A/DARWIN/9/2021 SAN-010 (H3N2) ANTIGEN (FORMALDEHYDE INACTIVATED), INFLUENZA B VIRUS B/PHUKET/3073/2013 ANTIGEN (FORMALDEHYDE INACTIVATED), AND INFLUENZA B VIRUS B/MICHIGAN/01/2021 ANTIGEN (FORMALDEHYDE INACTIVATED) 0.5 ML: 15; 15; 15; 15 INJECTION, SUSPENSION INTRAMUSCULAR at 11:36

## 2024-01-18 RX ADMIN — SENNOSIDES AND DOCUSATE SODIUM 2 TABLET: 8.6; 5 TABLET ORAL at 20:47

## 2024-01-18 RX ADMIN — BUSPIRONE HYDROCHLORIDE 10 MG: 10 TABLET ORAL at 08:38

## 2024-01-18 RX ADMIN — BUSPIRONE HYDROCHLORIDE 10 MG: 10 TABLET ORAL at 20:47

## 2024-01-18 RX ADMIN — Medication 50 MCG: at 08:38

## 2024-01-18 RX ADMIN — POLYETHYLENE GLYCOL 3350 17 G: 17 POWDER, FOR SOLUTION ORAL at 08:37

## 2024-01-18 ASSESSMENT — ACTIVITIES OF DAILY LIVING (ADL)
DRESS: INDEPENDENT;SCRUBS (BEHAVIORAL HEALTH)
ADLS_ACUITY_SCORE: 31
ADLS_ACUITY_SCORE: 31
ADLS_ACUITY_SCORE: 32
ORAL_HYGIENE: INDEPENDENT
ADLS_ACUITY_SCORE: 32
HYGIENE/GROOMING: INDEPENDENT
ADLS_ACUITY_SCORE: 31
ADLS_ACUITY_SCORE: 31
ADLS_ACUITY_SCORE: 32
DRESS: INDEPENDENT;SCRUBS (BEHAVIORAL HEALTH)
ADLS_ACUITY_SCORE: 31
ORAL_HYGIENE: INDEPENDENT
ADLS_ACUITY_SCORE: 31
LAUNDRY: UNABLE TO COMPLETE
HYGIENE/GROOMING: INDEPENDENT

## 2024-01-18 NOTE — PLAN OF CARE
"Face to face report received from Shailesh ABBOTT. Pt. Observed.    Problem: Adult Behavioral Health Plan of Care  Goal: Patient-Specific Goal (Individualization)  Description: Pt. Will follow recommendations of treatment team during hospital stay.   Pt. Will attend  >50% of unit programing during hospital stay.   Pt. Will sleep 6-8 hours a night during hospital stay.  Pt. Will maintain ADLs without prompting during hospital stay.   Can have shoes without laces, per provider.     Outcome: Progressing  Pt. Denies HI, SI, anxiety, and pain. Pt. Repots to continued depression. Appears to be responding to internal stimuli. \"If it gets worse I'll let you know.\" Pt. Cooperative with medications and nursing assessment. Pt. In agreement to update staff to thoughts feelings of wanting to harm self or others. Pt. Out to lounge, social with peers and staff.  Attending unit programing. Pt. Requesting his shoes. Provider verbal order to give pt. His shoes without laces. Pt. Administered influenza vaccine to left deltoid. Temp WDL. Written literature administered. Pt. Tolerated procedure well. Pt. Eating WDL. Pt. Offers no c/o issues with bowel and bladder.      Face to face end of shift report communicated to oncoming RN.     Duyen Garcia RN  1/18/2024        "

## 2024-01-18 NOTE — PLAN OF CARE
Problem: Adult Behavioral Health Plan of Care  Goal: Patient-Specific Goal (Individualization)  Description: Pt. Will follow recommendations of treatment team during hospital stay.   Pt. Will attend  >50% of unit programing during hospital stay.   Pt. Will sleep 6-8 hours a night during hospital stay.  Pt. Will maintain ADLs without prompting during hospital stay.   Outcome: Progressing     Problem: Psychotic Signs/Symptoms  Goal: Optimal Cognitive Function (Psychotic Signs/Symptoms)  Description: Pt will be free from psychotic symptoms prior to discharge  Outcome: Progressing     Report received from previously assigned nurse.     Rounds completed. Safety checks completed every 15 minutes throughout the night. Pt noted to be resting with eyes closed and easy resp. for 6 hours. No suicidal gestures or comments noted.    Face to face end of shift report to be communicated to oncoming RN.

## 2024-01-18 NOTE — PLAN OF CARE
Problem: Adult Behavioral Health Plan of Care  Goal: Patient-Specific Goal (Individualization)  Description: Pt. Will follow recommendations of treatment team during hospital stay.   Pt. Will attend  >50% of unit programing during hospital stay.   Pt. Will sleep 6-8 hours a night during hospital stay.  Pt. Will maintain ADLs without prompting during hospital stay.   Outcome: Progressing     Problem: Psychotic Signs/Symptoms  Goal: Optimal Cognitive Function (Psychotic Signs/Symptoms)  Description: Pt will be free from psychotic symptoms prior to discharge  Outcome: Progressing   Goal Outcome Evaluation:         Pt. Was napping until supper arrived, ate in lounge, appetite is good, slept 6 hours last night, taking prescribed medications, is independent with ADL's, is able to make needs be known, denies depression, anxiety, suicidal ideation and hallucinations,  denies pain or any physical symptoms, affect is bright and animated, cooperative with cares, will continue to monitor progress.    Face to face end of shift report will be communicated to oncoming night shift RN.     Emily Benedict RN  1/18/2024  6:03 PM
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
## 2024-01-19 PROCEDURE — 250N000013 HC RX MED GY IP 250 OP 250 PS 637: Performed by: NURSE PRACTITIONER

## 2024-01-19 PROCEDURE — 250N000013 HC RX MED GY IP 250 OP 250 PS 637: Performed by: STUDENT IN AN ORGANIZED HEALTH CARE EDUCATION/TRAINING PROGRAM

## 2024-01-19 PROCEDURE — 99232 SBSQ HOSP IP/OBS MODERATE 35: CPT

## 2024-01-19 PROCEDURE — 124N000004

## 2024-01-19 PROCEDURE — 250N000013 HC RX MED GY IP 250 OP 250 PS 637

## 2024-01-19 RX ORDER — QUETIAPINE FUMARATE 400 MG/1
400 TABLET, FILM COATED ORAL AT BEDTIME
Status: DISCONTINUED | OUTPATIENT
Start: 2024-01-19 | End: 2024-01-26 | Stop reason: HOSPADM

## 2024-01-19 RX ADMIN — BUSPIRONE HYDROCHLORIDE 10 MG: 10 TABLET ORAL at 20:49

## 2024-01-19 RX ADMIN — QUETIAPINE FUMARATE 400 MG: 400 TABLET ORAL at 20:49

## 2024-01-19 RX ADMIN — Medication 50 MCG: at 08:35

## 2024-01-19 RX ADMIN — HYDROXYZINE HYDROCHLORIDE 25 MG: 25 TABLET, FILM COATED ORAL at 10:40

## 2024-01-19 RX ADMIN — BUSPIRONE HYDROCHLORIDE 10 MG: 10 TABLET ORAL at 08:35

## 2024-01-19 RX ADMIN — LURASIDONE HYDROCHLORIDE 60 MG: 20 TABLET, FILM COATED ORAL at 13:00

## 2024-01-19 RX ADMIN — SENNOSIDES AND DOCUSATE SODIUM 2 TABLET: 8.6; 5 TABLET ORAL at 20:49

## 2024-01-19 RX ADMIN — POLYETHYLENE GLYCOL 3350 17 G: 17 POWDER, FOR SOLUTION ORAL at 08:35

## 2024-01-19 RX ADMIN — SENNOSIDES AND DOCUSATE SODIUM 2 TABLET: 8.6; 5 TABLET ORAL at 08:35

## 2024-01-19 ASSESSMENT — ACTIVITIES OF DAILY LIVING (ADL)
HYGIENE/GROOMING: INDEPENDENT
ADLS_ACUITY_SCORE: 32
LAUNDRY: UNABLE TO COMPLETE
ADLS_ACUITY_SCORE: 32
DRESS: INDEPENDENT;SCRUBS (BEHAVIORAL HEALTH)
ADLS_ACUITY_SCORE: 32
ADLS_ACUITY_SCORE: 32
ORAL_HYGIENE: INDEPENDENT
ADLS_ACUITY_SCORE: 32

## 2024-01-19 NOTE — PLAN OF CARE
Problem: Adult Behavioral Health Plan of Care  Goal: Patient-Specific Goal (Individualization)  Description: Pt. Will follow recommendations of treatment team during hospital stay.   Pt. Will attend  >50% of unit programing during hospital stay.   Pt. Will sleep 6-8 hours a night during hospital stay.  Pt. Will maintain ADLs without prompting during hospital stay.   Outcome: Not Progressing  Note: Report received from Emily ABBOTT.  Rounding complete.     Patient is walking in the hallway with a female peer and listening to music at start of shift.  Patient and was singing loudly and had to be prompted multiple times to lower their voice.  He was polite and was receptive to redirection.  He appears to be responding to internal stimuli.  He was observed talking to himself multiple times this shift.  He is walking in the hallway, turns around, has a conversation with someone who is not present, then continues a conversation with a peer.  The content of conversations with internal stimuli do not appear to be negative or agitating to him.  His affect and mood are elevated/elated.    Patient is cooperative with medications.  After a conversation with provider relating to medications, patient c/o anxiety.    1040:  Patient requested and received PRN hydroxyzine 25 mg po for c/o anxiety.
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
## 2024-01-19 NOTE — PROGRESS NOTES
KELLY submitted Senior Linkage Pre-Admission Screening to [facility] -Online form.     KELLY received a call from [facility] They confirmed that that received the Pe-Admission Screening and gave SW a Confirmation Code: PAS [id number]. KELLY emailed the Confirmation to [facility].

## 2024-01-20 PROCEDURE — 99232 SBSQ HOSP IP/OBS MODERATE 35: CPT | Mod: 95 | Performed by: STUDENT IN AN ORGANIZED HEALTH CARE EDUCATION/TRAINING PROGRAM

## 2024-01-20 PROCEDURE — 250N000013 HC RX MED GY IP 250 OP 250 PS 637: Performed by: STUDENT IN AN ORGANIZED HEALTH CARE EDUCATION/TRAINING PROGRAM

## 2024-01-20 PROCEDURE — 250N000013 HC RX MED GY IP 250 OP 250 PS 637: Performed by: NURSE PRACTITIONER

## 2024-01-20 PROCEDURE — 250N000013 HC RX MED GY IP 250 OP 250 PS 637

## 2024-01-20 PROCEDURE — 124N000004

## 2024-01-20 RX ADMIN — BUSPIRONE HYDROCHLORIDE 10 MG: 10 TABLET ORAL at 08:21

## 2024-01-20 RX ADMIN — HYDROXYZINE HYDROCHLORIDE 25 MG: 25 TABLET, FILM COATED ORAL at 12:40

## 2024-01-20 RX ADMIN — BUSPIRONE HYDROCHLORIDE 10 MG: 10 TABLET ORAL at 20:03

## 2024-01-20 RX ADMIN — POLYETHYLENE GLYCOL 3350 17 G: 17 POWDER, FOR SOLUTION ORAL at 08:21

## 2024-01-20 RX ADMIN — SENNOSIDES AND DOCUSATE SODIUM 2 TABLET: 8.6; 5 TABLET ORAL at 08:21

## 2024-01-20 RX ADMIN — SENNOSIDES AND DOCUSATE SODIUM 2 TABLET: 8.6; 5 TABLET ORAL at 20:03

## 2024-01-20 RX ADMIN — LURASIDONE HYDROCHLORIDE 60 MG: 20 TABLET, FILM COATED ORAL at 12:06

## 2024-01-20 RX ADMIN — QUETIAPINE FUMARATE 400 MG: 400 TABLET ORAL at 20:03

## 2024-01-20 RX ADMIN — Medication 50 MCG: at 08:21

## 2024-01-20 ASSESSMENT — ACTIVITIES OF DAILY LIVING (ADL)
DRESS: INDEPENDENT
DRESS: INDEPENDENT
ADLS_ACUITY_SCORE: 32
LAUNDRY: UNABLE TO COMPLETE
ADLS_ACUITY_SCORE: 32
ORAL_HYGIENE: INDEPENDENT
ADLS_ACUITY_SCORE: 32
HYGIENE/GROOMING: INDEPENDENT
ADLS_ACUITY_SCORE: 32
LAUNDRY: UNABLE TO COMPLETE
ADLS_ACUITY_SCORE: 32
ADLS_ACUITY_SCORE: 32
ORAL_HYGIENE: INDEPENDENT
HYGIENE/GROOMING: INDEPENDENT
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
## 2024-01-20 NOTE — PLAN OF CARE
Face to face end of shift report received from Daniel WARD RN. Rounding completed. Patient observed in lounge.     Pt has been calm, cooperative this shift. He denied any SI/HI and AH/VH though pt is visibly responding to internal stimuli. Pt observed sitting in the lounge talking, laughing, and clapping to self. He then is observed having a conversation with self while alone at the table and again when walking the halls with his r=headphones on. He has a bright affect and is pleasant. He is social with peers and staff. He has attended groups throughout the morning. Pt reports high anxiety this afternoon and requested Hydroxyzine for this. Pt reported that his anxiety is so high because he hasn't had any nicotine for 3 days. PRN effective per pt.     Continued care from dayshift into evening shift ([id number]).     Pt rested in bed until about dinner time. Pt out in the lounge this evening. He doesn't appear to be responding as much as he was on dayshift. Pt sat in the lounge with peers watching football. Pt able to make his needs known. Steady gait- no falls. Frequent rounding.     Problem: Adult Behavioral Health Plan of Care  Goal: Patient-Specific Goal (Individualization)  Description: Pt. Will follow recommendations of treatment team during hospital stay.   Pt. Will attend  >50% of unit programing during hospital stay.   Pt. Will sleep 6-8 hours a night during hospital stay.  Pt. Will maintain ADLs without prompting during hospital stay.   Outcome: Progressing     Problem: Psychotic Signs/Symptoms  Goal: Optimal Cognitive Function (Psychotic Signs/Symptoms)  Description: Pt will be free from psychotic symptoms prior to discharge  Outcome: Progressing       Priscilla Sutherland RN  1/20/2024  4:03 PM

## 2024-01-20 NOTE — PLAN OF CARE
Goal Outcome Evaluation:  Problem: Adult Behavioral Health Plan of Care  Goal: Patient-Specific Goal (Individualization)  Description: Pt. Will follow recommendations of treatment team during hospital stay.   Pt. Will attend  >50% of unit programing during hospital stay.   Pt. Will sleep 6-8 hours a night during hospital stay.  Pt. Will maintain ADLs without prompting during hospital stay.   Outcome: Progressing  Problem: Psychotic Signs/Symptoms  Goal: Optimal Cognitive Function (Psychotic Signs/Symptoms)  Description: Pt will be free from psychotic symptoms prior to discharge  Outcome: Progressing    Face to face end of shift report received from Sharon ABBOTT. Rounding completed. Patient observed resting in bed at beginning of shift.     Pt has been in bed with eyes closed, 15 minute checks completed throughout the shift. Appeared to be sleeping for 7 hours.      Face to face report given with opportunity to observe patient.    Report given to Priscilla Sanchez RN   1/20/2024  7:15 AM

## 2024-01-20 NOTE — PLAN OF CARE
Problem: Adult Behavioral Health Plan of Care  Goal: Patient-Specific Goal (Individualization)  Description: Pt. Will follow recommendations of treatment team during hospital stay.   Pt. Will attend  >50% of unit programing during hospital stay.   Pt. Will sleep 6-8 hours a night during hospital stay.  Pt. Will maintain ADLs without prompting during hospital stay.   Outcome: Progressing   Goal Outcome Evaluation:    Plan of Care Reviewed With: patient      Patient is Alert, able to make needs known. VSS, afebrile. Assessment and vitals as charted. Denies pain. Patient has been cooperative with nursing assessment. Bright affect. Denies SI, SIB, HI, depression. He is attending group, social with peers. Steady gait, no falls. Appetite good. Ate 100% of dinner.

## 2024-01-21 PROCEDURE — 99232 SBSQ HOSP IP/OBS MODERATE 35: CPT | Performed by: NURSE PRACTITIONER

## 2024-01-21 PROCEDURE — 250N000013 HC RX MED GY IP 250 OP 250 PS 637

## 2024-01-21 PROCEDURE — 250N000013 HC RX MED GY IP 250 OP 250 PS 637: Performed by: NURSE PRACTITIONER

## 2024-01-21 PROCEDURE — 250N000013 HC RX MED GY IP 250 OP 250 PS 637: Performed by: STUDENT IN AN ORGANIZED HEALTH CARE EDUCATION/TRAINING PROGRAM

## 2024-01-21 PROCEDURE — 124N000004

## 2024-01-21 RX ADMIN — LURASIDONE HYDROCHLORIDE 60 MG: 20 TABLET, FILM COATED ORAL at 12:17

## 2024-01-21 RX ADMIN — BUSPIRONE HYDROCHLORIDE 10 MG: 10 TABLET ORAL at 08:11

## 2024-01-21 RX ADMIN — POLYETHYLENE GLYCOL 3350 17 G: 17 POWDER, FOR SOLUTION ORAL at 08:11

## 2024-01-21 RX ADMIN — SENNOSIDES AND DOCUSATE SODIUM 2 TABLET: 8.6; 5 TABLET ORAL at 20:41

## 2024-01-21 RX ADMIN — QUETIAPINE FUMARATE 400 MG: 400 TABLET ORAL at 20:41

## 2024-01-21 RX ADMIN — Medication 50 MCG: at 08:11

## 2024-01-21 RX ADMIN — BUSPIRONE HYDROCHLORIDE 10 MG: 10 TABLET ORAL at 20:41

## 2024-01-21 RX ADMIN — SENNOSIDES AND DOCUSATE SODIUM 2 TABLET: 8.6; 5 TABLET ORAL at 08:11

## 2024-01-21 ASSESSMENT — ACTIVITIES OF DAILY LIVING (ADL)
ORAL_HYGIENE: INDEPENDENT
ADLS_ACUITY_SCORE: 32
ORAL_HYGIENE: INDEPENDENT
ADLS_ACUITY_SCORE: 32
LAUNDRY: UNABLE TO COMPLETE
ADLS_ACUITY_SCORE: 32
ADLS_ACUITY_SCORE: 32
HYGIENE/GROOMING: INDEPENDENT
ADLS_ACUITY_SCORE: 32
ADLS_ACUITY_SCORE: 32
DRESS: INDEPENDENT;SCRUBS (BEHAVIORAL HEALTH)
LAUNDRY: UNABLE TO COMPLETE
ADLS_ACUITY_SCORE: 32
DRESS: INDEPENDENT
HYGIENE/GROOMING: INDEPENDENT

## 2024-01-21 NOTE — PLAN OF CARE
Face to face end of shift report received from Priscilla ABBOTT. Rounding completed. Patient observed in bed appears asleep.     Problem: Adult Behavioral Health Plan of Care  Goal: Patient-Specific Goal (Individualization)  Description: Pt. Will follow recommendations of treatment team during hospital stay.   Pt. Will attend  >50% of unit programing during hospital stay.   Pt. Will sleep 6-8 hours a night during hospital stay.  Pt. Will maintain ADLs without prompting during hospital stay.   Outcome: Progressing     Problem: Psychotic Signs/Symptoms  Goal: Optimal Cognitive Function (Psychotic Signs/Symptoms)  Description: Pt will be free from psychotic symptoms prior to discharge  Outcome: Progressing   Goal Outcome Evaluation:         No observed episodes of SIB this shift. Patient slept (7) hours. Face to face end of shift report communicated to oncoming shift.     Ashwin Brandon RN  1/21/2024  0609 AM                      Self
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
## 2024-01-21 NOTE — PLAN OF CARE
"Face to face end of shift report received from Priscilla ROBERTS RN.  Pt observed in group therapy.      Problem: Adult Behavioral Health Plan of Care  Goal: Patient-Specific Goal (Individualization)  Description: Pt. Will follow recommendations of treatment team during hospital stay.   Pt. Will attend  >50% of unit programing during hospital stay.   Pt. Will sleep 6-8 hours a night during hospital stay.  Pt. Will maintain ADLs without prompting during hospital stay.   Outcome: Progressing     Problem: Psychotic Signs/Symptoms  Goal: Optimal Cognitive Function (Psychotic Signs/Symptoms)  Description: Pt will be free from psychotic symptoms prior to discharge  Outcome: Progressing   Goal Outcome Evaluation:         Pt is pleasant and cooperative with writer shift. He states \" my day has been great.\"  Pt has been attending group therapy.  Pt appears to have a bright affect.  Pt denies SI, HI, pain, depression and halluciantions. Pt does appear to be responding to internal stimuli.  Has been talking loudly to himself in the lounge. Declines a need for PRNS. Has been social with peers.  Pt cooperative with all scheduled medications.  Pt makes needs known.      Face to face end of shift report communicated to oncoming MILENA.     Marylou Adrian RN  1/21/2024                      "

## 2024-01-21 NOTE — PLAN OF CARE
Face to face end of shift report received from Ashwin LUIS RN. Rounding completed. Patient observed in bed, awake.     Pt has been calm, cooperative this shift. He is more withdrawn today, but participates in the milieu. He doesn't appear to be hallucinations like he was yesterday. He has a bright affect though isn't animated today. He denied any SI/HI and AH/VH. Denied pain. Declined Nicotine patch. He has attended groups throughout the day. He is able to make his needs known. Steady gait- no falls. Frequent rounding.     Problem: Adult Behavioral Health Plan of Care  Goal: Patient-Specific Goal (Individualization)  Description: Pt. Will follow recommendations of treatment team during hospital stay.   Pt. Will attend  >50% of unit programing during hospital stay.   Pt. Will sleep 6-8 hours a night during hospital stay.  Pt. Will maintain ADLs without prompting during hospital stay.   Outcome: Progressing     Problem: Psychotic Signs/Symptoms  Goal: Optimal Cognitive Function (Psychotic Signs/Symptoms)  Description: Pt will be free from psychotic symptoms prior to discharge  Outcome: Progressing       Priscilla Sutherland RN  1/21/2024  11:31 AM'

## 2024-01-22 PROCEDURE — 250N000013 HC RX MED GY IP 250 OP 250 PS 637: Performed by: NURSE PRACTITIONER

## 2024-01-22 PROCEDURE — 250N000013 HC RX MED GY IP 250 OP 250 PS 637

## 2024-01-22 PROCEDURE — 250N000013 HC RX MED GY IP 250 OP 250 PS 637: Performed by: STUDENT IN AN ORGANIZED HEALTH CARE EDUCATION/TRAINING PROGRAM

## 2024-01-22 PROCEDURE — 124N000004

## 2024-01-22 PROCEDURE — 99232 SBSQ HOSP IP/OBS MODERATE 35: CPT | Performed by: NURSE PRACTITIONER

## 2024-01-22 RX ADMIN — SENNOSIDES AND DOCUSATE SODIUM 2 TABLET: 8.6; 5 TABLET ORAL at 20:38

## 2024-01-22 RX ADMIN — SENNOSIDES AND DOCUSATE SODIUM 2 TABLET: 8.6; 5 TABLET ORAL at 08:16

## 2024-01-22 RX ADMIN — LURASIDONE HYDROCHLORIDE 60 MG: 20 TABLET, FILM COATED ORAL at 12:36

## 2024-01-22 RX ADMIN — HYDROXYZINE HYDROCHLORIDE 25 MG: 25 TABLET, FILM COATED ORAL at 10:12

## 2024-01-22 RX ADMIN — POLYETHYLENE GLYCOL 3350 17 G: 17 POWDER, FOR SOLUTION ORAL at 08:16

## 2024-01-22 RX ADMIN — BUSPIRONE HYDROCHLORIDE 10 MG: 10 TABLET ORAL at 20:38

## 2024-01-22 RX ADMIN — BUSPIRONE HYDROCHLORIDE 10 MG: 10 TABLET ORAL at 08:16

## 2024-01-22 RX ADMIN — ACETAMINOPHEN 650 MG: 325 TABLET, FILM COATED ORAL at 21:53

## 2024-01-22 RX ADMIN — Medication 3 MG: at 21:53

## 2024-01-22 RX ADMIN — Medication 50 MCG: at 08:16

## 2024-01-22 RX ADMIN — QUETIAPINE FUMARATE 400 MG: 400 TABLET ORAL at 20:38

## 2024-01-22 ASSESSMENT — ACTIVITIES OF DAILY LIVING (ADL)
ADLS_ACUITY_SCORE: 32

## 2024-01-22 NOTE — PLAN OF CARE
Face to face shift report received from MILENA Geller.       Problem: Adult Behavioral Health Plan of Care  Goal: Patient-Specific Goal (Individualization)  Description: Pt. Will follow recommendations of treatment team during hospital stay.   Pt. Will attend  >50% of unit programing during hospital stay.   Pt. Will sleep 6-8 hours a night during hospital stay.  Pt. Will maintain ADLs without prompting during hospital stay.   Outcome: Progressing   Calm and cooperative. Medication compliant. Reports anxiety, requested and received Hydroxyzine 25mg at 1012. Elated at times. Spends majority of shift in lounge area. Social and appropriate with others. No reports of pain.     Problem: Psychotic Signs/Symptoms  Goal: Optimal Cognitive Function (Psychotic Signs/Symptoms)  Description: Pt will be free from psychotic symptoms prior to discharge  Outcome: Progressing         Face to face end of shift report to be communicated to oncoming RN.

## 2024-01-22 NOTE — PLAN OF CARE
"Face to face report received from Marylou ABBOTT. Pt. Observed.    Problem: Adult Behavioral Health Plan of Care  Goal: Patient-Specific Goal (Individualization)  Description: Pt. Will follow recommendations of treatment team during hospital stay.   Pt. Will attend  >50% of unit programing during hospital stay.   Pt. Will sleep 6-8 hours a night during hospital stay.  Pt. Will maintain ADLs without prompting during hospital stay.   Outcome: Progressing   Pt has been in bed with eyes closed and regular respirations x 6 hours. 15 minute and PRN checks all night. Pt. To nurses station @ approximately 0500 requesting shower supplies. Pt. Appeared upset, reporting \"I don't want to talk about it and I need my privacy.\" Pt. Updating this writer he had a nocturnal emission. Shower supplies and clean clothing administered.       Face to face end of shift report communicated to oncoming MILENA.     Duyen Garcia RN  1/21/2024        "

## 2024-01-22 NOTE — PROGRESS NOTES
Laquita with [facility] called and requested H&P for a DD assessment to schedule an assessment for DD services. Laquita stated that a screener named Nikki will be contacting the patient to do an assessment.     KELLY faxed Laquita  [facility] H&P per her request @ Fax number [phone].
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
## 2024-01-23 ENCOUNTER — MEDICAL CORRESPONDENCE (OUTPATIENT)
Dept: HEALTH INFORMATION MANAGEMENT | Facility: CLINIC | Age: 34
End: 2024-01-23
Payer: MEDICARE

## 2024-01-23 PROCEDURE — 250N000013 HC RX MED GY IP 250 OP 250 PS 637: Performed by: NURSE PRACTITIONER

## 2024-01-23 PROCEDURE — 99232 SBSQ HOSP IP/OBS MODERATE 35: CPT

## 2024-01-23 PROCEDURE — 124N000004

## 2024-01-23 PROCEDURE — 250N000013 HC RX MED GY IP 250 OP 250 PS 637: Performed by: STUDENT IN AN ORGANIZED HEALTH CARE EDUCATION/TRAINING PROGRAM

## 2024-01-23 PROCEDURE — 250N000013 HC RX MED GY IP 250 OP 250 PS 637

## 2024-01-23 RX ADMIN — SENNOSIDES AND DOCUSATE SODIUM 2 TABLET: 8.6; 5 TABLET ORAL at 20:23

## 2024-01-23 RX ADMIN — BUSPIRONE HYDROCHLORIDE 10 MG: 10 TABLET ORAL at 20:23

## 2024-01-23 RX ADMIN — LURASIDONE HYDROCHLORIDE 60 MG: 20 TABLET, FILM COATED ORAL at 12:43

## 2024-01-23 RX ADMIN — SENNOSIDES AND DOCUSATE SODIUM 2 TABLET: 8.6; 5 TABLET ORAL at 08:39

## 2024-01-23 RX ADMIN — Medication 50 MCG: at 08:38

## 2024-01-23 RX ADMIN — POLYETHYLENE GLYCOL 3350 17 G: 17 POWDER, FOR SOLUTION ORAL at 08:39

## 2024-01-23 RX ADMIN — QUETIAPINE FUMARATE 400 MG: 400 TABLET ORAL at 20:22

## 2024-01-23 RX ADMIN — BUSPIRONE HYDROCHLORIDE 10 MG: 10 TABLET ORAL at 08:39

## 2024-01-23 ASSESSMENT — ACTIVITIES OF DAILY LIVING (ADL)
ADLS_ACUITY_SCORE: 32
LAUNDRY: UNABLE TO COMPLETE
ADLS_ACUITY_SCORE: 32
ORAL_HYGIENE: INDEPENDENT
DRESS: INDEPENDENT;SCRUBS (BEHAVIORAL HEALTH)
ADLS_ACUITY_SCORE: 32
HYGIENE/GROOMING: INDEPENDENT
ADLS_ACUITY_SCORE: 32
HYGIENE/GROOMING: INDEPENDENT
DRESS: SCRUBS (BEHAVIORAL HEALTH)
ADLS_ACUITY_SCORE: 32
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
## 2024-01-23 NOTE — PROGRESS NOTES
"[facility] PSYCHIATRY  PROGRESS NOTE     SUBJECTIVE     Prior to interviewing the patient, I met with nursing and reviewed patient's clinical condition. We discussed clinical care both before and after the interview. I have reviewed the patient's clinical course by review of records including previous notes, labs, and vital signs.     Per nursing, the patient had the following behavioral events over the last 24-hours: none. Slept 7 hours.     Upon psychiatric interview, pt tells me he is \"blessed\" today. He tells me that he believes the dose increase of Seroquel has been helpful for him in terms of AH and feeling more calm. He denies any adverse effects from this recent increase. He denies anxiety, depression, SI, HI or SIB. He denies AVH but states he has \"good thoughts\" that make him smile. He states he eats and sleeps well. He was wondering about DEMPSEY versions of his neuroleptics, which I informed this would require switching antipsychotics. Pt verbalized understanding.           MEDICATIONS   Scheduled Meds:   busPIRone  10 mg Oral BID    lurasidone  60 mg Oral Daily with lunch    nicotine   Transdermal Q8H    polyethylene glycol  17 g Oral Daily    QUEtiapine  400 mg Oral At Bedtime    senna-docusate  2 tablet Oral BID    Vitamin D3  50 mcg Oral Daily     PRN Meds:.acetaminophen, albuterol, alum & mag hydroxide-simethicone, hydrOXYzine HCl, melatonin, nicotine, nicotine, OLANZapine **OR** OLANZapine     ALLERGIES   No Known Allergies     MENTAL STATUS EXAM   Vitals: /73   Pulse 87   Temp 97.4  F (36.3  C) (Tympanic)   Resp 16   Wt 140.8 kg (310 lb 6.4 oz)   SpO2 96%   BMI 42.10 kg/m      Appearance:  awake, alert, adequately groomed, dressed in hospital scrubs, and appeared as age stated  Attitude:  cooperative  Eye Contact: Intact  Mood: \"Belssed\"  Affect: Bright though not elated  Speech:  clear, coherent not pressured  Psychomotor Behavior:  no evidence of tardive dyskinesia, dystonia, or " tics. No akathisia.  Thought Process:   concrete .  Very fact based conversation  Associations:  no loose associations  Thought Content:  Denies SI or HI.  Denies hallucinations  Insight: Improving  Judgment: Improving  Oriented to:  time, person, and place  Attention Span and Concentration:  intact  Recent and Remote Memory:  intact  Language: English with appropriate syntax and vocabulary  Fund of Knowledge: low-normal  Muscle Strength and Tone: normal  Gait and Station: Normal       LABS   No results found for this or any previous visit (from the past 24 hour(s)).      IMPRESSION     This is a 33 year old male with a PMH of SZAD, bipolar type, ASD, ARETHA, ADHD MDD, cannabis and ETOH use with psychotic features who presented to [place], [place] via ambulance 1/1/23 with sx of psychosis possibly from medication non-compliance. It appears he had driven to the [facility] by mistake when he was going to [place], [place] to visit someone he had met from a previous admission. Pt impulsively had driven north without this other person being aware he was planning on visiting them. [place] he [place] the border, he turned around. Apparently this activity was noted to be suspicious by law enforcement and was pursued and ultimately required disabling the vehicle to get the pt to stop. He was repeating that he was a prisoner of war and was brought to the local ED, where he was evaluated and referred to  psychiatric stabilization. Pt was recently at [facility] from 12/10/23-12/27/23 for psychosis and SI where he was placed on a commitment and Mckeon. He had recently been stabilized on Seroquel and Latuda and was discharged home, where it is likely he stopped taking his medication.      During this current presentation, it appears that this episode is influenced by medication noncompliance in context of SZAD, bipolar type. A possible complicating factor is is neurodevelopmental diagnosis as well as  psychosocial stressors, including homelessness and recent loss of transportation. Social support may also be contributing but his situation is likely multifactorial. Due to the above presentation, pt was admitted for [facility] for further safety and stabilization.       Today: Pt does appear to have improved in terms of breakthrough psychosis noted last week. Pt pleasant in conversation. Denies any adverse effects from Seroquel, continue to monitor for nocturia. Plan to discharge Friday at 0600.     DIAGNOSES     SZAD, bipolar type, multiple episodes, currently in acute episode  2.   ASD  3.   ADHD  4.   ARETHA       PLAN     Location: Unit 5  Legal Status: Orders Placed This Encounter      Voluntary    Commitment: In effect through [facility], admitted voluntary though  Mckeon: In effect, medications - Haldol, Zyprexa, Seroquel, Latuda, Invega     Safety Assessment:    Behavioral Orders   Procedures    Code 1 - Restrict to Unit    Routine Programming     As clinically indicated    Status 15     Every 15 minutes.      PTA psychotropic medications held:      -none     PTA psychotropic medications continued/changed:      -BuSpar 5 mg BID -> 10 mg BID   -Latuda 40 mg daily -> 60 mg daily on 1/7  -Seroquel 300 mg at bedtime->400 mg at bedtime 1/19  -hydroxyzine 25 mg prn     New medications initiated:      -None    Today's Changes:    -no changes. Monitor nocturia possibly change to XR seroquel vs lower dose of regular seroquel     Programming: Patient will be treated in a therapeutic milieu with appropriate individual and group therapies. Education will be provided on diagnoses, medications, and treatments.     Medical diagnoses:  Per medicine    #. Myopia  - Patient found prescription  - Patient to obtain outpatient due to unable to obtain here    #. Obesity, class III  #. History of prediabetes  - Recommend Ozempic outpatient given he was losing weight on this  - F/U  outpatient  - Not interested in medication like Metformin here    Consult: None  Tests: None. EKG wnl.     Anticipated LOS: > 5 days  Disposition: IRTS       ATTESTATION    AUDIE Aden CNP

## 2024-01-23 NOTE — PLAN OF CARE
Problem: Adult Behavioral Health Plan of Care  Goal: Patient-Specific Goal (Individualization)  Description: Pt. Will follow recommendations of treatment team during hospital stay.   Pt. Will attend  >50% of unit programing during hospital stay.   Pt. Will sleep 6-8 hours a night during hospital stay.  Pt. Will maintain ADLs without prompting during hospital stay.   Outcome: Progressing  Note: Report received from Ashwin ABBOTT. Rounding complete.  Patient observed in the lounge at start of shift.  He is cooperative with medications and assessment. He is social with peers.  Attends available groups.  He is looking forward to discharging to  this week.  His affect is full range and mood is calm.  No PRN medications administered this time.  Noted decrease in symptoms to psychosis.       Problem: Psychotic Signs/Symptoms  Goal: Optimal Cognitive Function (Psychotic Signs/Symptoms)  Description: Pt will be free from psychotic symptoms prior to discharge  Outcome: Progressing   Goal Outcome Evaluation:

## 2024-01-23 NOTE — PLAN OF CARE
Face to face end of shift report received from Elyse ABBOTT. Rounding completed. Patient observed in bed appears asleep.       Problem: Adult Behavioral Health Plan of Care  Goal: Patient-Specific Goal (Individualization)  Description: Pt. Will follow recommendations of treatment team during hospital stay.   Pt. Will attend  >50% of unit programing during hospital stay.   Pt. Will sleep 6-8 hours a night during hospital stay.  Pt. Will maintain ADLs without prompting during hospital stay.   Outcome: Progressing     Problem: Psychotic Signs/Symptoms  Goal: Optimal Cognitive Function (Psychotic Signs/Symptoms)  Description: Pt will be free from psychotic symptoms prior to discharge  Outcome: Progressing   Goal Outcome Evaluation:         No observed episodes of SIB this shift. Patient slept (7) hours. Face to face end of shift report communicated to oncoming shift.     Ashwin Brandon RN  1/23/2024  0607 AM

## 2024-01-23 NOTE — PROGRESS NOTES
[facility] Emailed KELLY stating that the patient will be screened for DD services once he arrives at [facility]. The screener's contact is - Parris Melchor s phone  is [phone].      KELLY called Lemuel Camacho. They will pick the patient up @ 6:00 am and transport him to [facility]. SW updated Nursing, patient, and [facility].

## 2024-01-24 PROCEDURE — 99232 SBSQ HOSP IP/OBS MODERATE 35: CPT

## 2024-01-24 PROCEDURE — 250N000013 HC RX MED GY IP 250 OP 250 PS 637: Performed by: NURSE PRACTITIONER

## 2024-01-24 PROCEDURE — 250N000013 HC RX MED GY IP 250 OP 250 PS 637: Performed by: STUDENT IN AN ORGANIZED HEALTH CARE EDUCATION/TRAINING PROGRAM

## 2024-01-24 PROCEDURE — 250N000013 HC RX MED GY IP 250 OP 250 PS 637

## 2024-01-24 PROCEDURE — 124N000004

## 2024-01-24 RX ADMIN — OLANZAPINE 10 MG: 10 TABLET, FILM COATED ORAL at 23:39

## 2024-01-24 RX ADMIN — QUETIAPINE FUMARATE 400 MG: 400 TABLET ORAL at 20:16

## 2024-01-24 RX ADMIN — HYDROXYZINE HYDROCHLORIDE 25 MG: 25 TABLET, FILM COATED ORAL at 01:19

## 2024-01-24 RX ADMIN — NICOTINE 1 PATCH: 7 PATCH, EXTENDED RELEASE TRANSDERMAL at 13:13

## 2024-01-24 RX ADMIN — BUSPIRONE HYDROCHLORIDE 10 MG: 10 TABLET ORAL at 20:16

## 2024-01-24 RX ADMIN — LURASIDONE HYDROCHLORIDE 60 MG: 20 TABLET, FILM COATED ORAL at 12:36

## 2024-01-24 RX ADMIN — BUSPIRONE HYDROCHLORIDE 10 MG: 10 TABLET ORAL at 08:14

## 2024-01-24 RX ADMIN — Medication 3 MG: at 23:39

## 2024-01-24 RX ADMIN — Medication 3 MG: at 01:19

## 2024-01-24 RX ADMIN — Medication 50 MCG: at 08:14

## 2024-01-24 RX ADMIN — NICOTINE POLACRILEX 2 MG: 2 GUM, CHEWING ORAL at 19:14

## 2024-01-24 RX ADMIN — NICOTINE POLACRILEX 2 MG: 2 GUM, CHEWING ORAL at 13:17

## 2024-01-24 ASSESSMENT — ACTIVITIES OF DAILY LIVING (ADL)
HYGIENE/GROOMING: INDEPENDENT
DRESS: INDEPENDENT
HYGIENE/GROOMING: INDEPENDENT
DRESS: SCRUBS (BEHAVIORAL HEALTH);INDEPENDENT
ADLS_ACUITY_SCORE: 32
ORAL_HYGIENE: INDEPENDENT
ADLS_ACUITY_SCORE: 32
LAUNDRY: UNABLE TO COMPLETE
ADLS_ACUITY_SCORE: 32
LAUNDRY: UNABLE TO COMPLETE
ADLS_ACUITY_SCORE: 32
ADLS_ACUITY_SCORE: 32
ORAL_HYGIENE: INDEPENDENT
ADLS_ACUITY_SCORE: 32

## 2024-01-24 NOTE — PROGRESS NOTES
KELLY emailed the updated [facility] intake forms to them.     KELLY was contacted by [facility] that the patient needs to be screened by [facility] for the MI.     KELLY received a call from Denita with [facility]. She will arrive at 9:15 am tomorrow 1/25/2024 and screen the patient for MI for his admission to [facility].

## 2024-01-24 NOTE — PROGRESS NOTES
"[facility] PSYCHIATRY  PROGRESS NOTE     SUBJECTIVE     Prior to interviewing the patient, I met with nursing and reviewed patient's clinical condition. We discussed clinical care both before and after the interview. I have reviewed the patient's clinical course by review of records including previous notes, labs, and vital signs.     Per nursing, the patient had the following behavioral events over the last 24-hours: reported strange dream where he was levitated off his bed and unable to move, felt like someone was pulling him. Thoughts a donavon bear was in his stomach, making noises from his abd. Slept 7 hours.     Upon psychiatric interview, pt tells me he he had a strange dream last night where he felt as though he had sleep paralysis. He states he felt like he was levitated over his bed and being pulled by someone. This was somewhere around 0115 the pt notes. He states he was offered Zyprexa at that time but he was unsure if this would interact with the rest of his medications, so he requested Atarax and melatonin. He notes that this was not helpful as he did not feel as though he had slept. He wonders if this could be nicotine withdrawal related considering he has not had nicotine replacement for 7 days.     I asked if there were any other sx he experienced through the night into the morning. He tells me he does not believe so. I asked him directly if he thought he had a black donavon bear in his abdomen, he notes \"I don't know\". He states he was given a black donavon bear stuffed animal when he was born, which was soft and had a felt nose. He notes he donated this stuffed animal 6 months ago to a [employer].     He denies any adverse effects from his medications. We did discuss potential of increasing of Seroquel considering potential delusional thoughts regarding the donavon bear. He tells me these thoughts are not disturbing and that he is not a threat to himself or others. Would consider increasing Seroquel if " "sx persist as this may be a vivid dream or potential delusion.     He would like to hold off on Miralax and Senna at this point as he does not want to become dependent on these for bowel management. He did state he would take Metamucil PRN at this time. We discussed we would leave bowel management as ordered and could refuse meds if he would prefer.      MEDICATIONS   Scheduled Meds:   busPIRone  10 mg Oral BID    lurasidone  60 mg Oral Daily with lunch    nicotine   Transdermal Q8H    polyethylene glycol  17 g Oral Daily    QUEtiapine  400 mg Oral At Bedtime    senna-docusate  2 tablet Oral BID    Vitamin D3  50 mcg Oral Daily     PRN Meds:.acetaminophen, albuterol, alum & mag hydroxide-simethicone, hydrOXYzine HCl, melatonin, nicotine, nicotine, OLANZapine **OR** OLANZapine, psyllium     ALLERGIES   No Known Allergies     MENTAL STATUS EXAM   Vitals: /75 (BP Location: Right arm)   Pulse 85   Temp 97.3  F (36.3  C) (Tympanic)   Resp 16   Wt 140.8 kg (310 lb 6.4 oz)   SpO2 96%   BMI 42.10 kg/m      Appearance:  awake, alert, adequately groomed, dressed in hospital scrubs, and appeared as age stated  Attitude:  cooperative  Eye Contact: Intact  Mood: \"good\"  Affect: Bright though not elated  Speech:  clear, coherent not pressured  Psychomotor Behavior:  no evidence of tardive dyskinesia, dystonia, or tics. No akathisia.  Thought Process:   concrete .  Very fact based conversation  Associations:  no loose associations  Thought Content:  Denies SI or HI.  Denies hallucinations, potential delusion of donavon bear in his abd.  Insight: Improving  Judgment: Improving  Oriented to:  time, person, and place  Attention Span and Concentration:  intact  Recent and Remote Memory:  intact  Language: English with appropriate syntax and vocabulary  Fund of Knowledge: low-normal  Muscle Strength and Tone: normal  Gait and Station: Normal       LABS   No results found for this or any previous visit (from the past 24 " hour(s)).      IMPRESSION     This is a 33 year old male with a PMH of SZAD, bipolar type, ASD, ARETHA, ADHD MDD, cannabis and ETOH use with psychotic features who presented to [place], [place] via ambulance 1/1/23 with sx of psychosis possibly from medication non-compliance. It appears he had driven to the [facility] Border by mistake when he was going to [place], [place] to visit someone he had met from a previous admission. Pt impulsively had driven north without this other person being aware he was planning on visiting them. When he mistakenly reached the border, he turned around. Apparently this activity was noted to be suspicious by law enforcement and was pursued and ultimately required disabling the vehicle to get the pt to stop. He was repeating that he was a prisoner of war and was brought to the local ED, where he was evaluated and referred to  psychiatric stabilization. Pt was recently at [facility] from 12/10/23-12/27/23 for psychosis and SI where he was placed on a commitment and Mckeon. He had recently been stabilized on Seroquel and Latuda and was discharged home, where it is likely he stopped taking his medication.      During this current presentation, it appears that this episode is influenced by medication noncompliance in context of SZAD, bipolar type. A possible complicating factor is is neurodevelopmental diagnosis as well as psychosocial stressors, including homelessness and recent loss of transportation. Social support may also be contributing but his situation is likely multifactorial. Due to the above presentation, pt was admitted for [facility] for further safety and stabilization.       Today: Pt does appear to have improved in terms of breakthrough psychosis noted last week, though has made statements of a donavon bear in his abd. He is unsure if this is true or not. This does not appear upsetting, will observe and consider increasing Seroquel.  Pt pleasant in conversation. Denies any adverse effects from Seroquel, continue to monitor for nocturia. Plan to discharge Friday at 0600.     DIAGNOSES     SZAD, bipolar type, multiple episodes, currently in acute episode  2.   ASD  3.   ADHD  4.   ARETHA       PLAN     Location: Unit 5  Legal Status: Orders Placed This Encounter      Voluntary    Commitment: In effect through [facility], admitted voluntary though  Mckeon: In effect, medications - Haldol, Zyprexa, Seroquel, Latuda, Invega     Safety Assessment:    Behavioral Orders   Procedures    Code 1 - Restrict to Unit    Routine Programming     As clinically indicated    Status 15     Every 15 minutes.      PTA psychotropic medications held:      -none     PTA psychotropic medications continued/changed:      -BuSpar 5 mg BID -> 10 mg BID   -Latuda 40 mg daily -> 60 mg daily on 1/7  -Seroquel 300 mg at bedtime->400 mg at bedtime 1/19  -hydroxyzine 25 mg prn     New medications initiated:      -None    Today's Changes:    -no changes. Monitor nocturia possibly change to XR seroquel vs lower dose of regular seroquel     Programming: Patient will be treated in a therapeutic milieu with appropriate individual and group therapies. Education will be provided on diagnoses, medications, and treatments.     Medical diagnoses:  Per medicine    #. Myopia  - Patient found prescription  - Patient to obtain outpatient due to unable to obtain here    #. Obesity, class III  #. History of prediabetes  - Recommend Ozempic outpatient given he was losing weight on this  - F/U outpatient  - Not interested in medication like Metformin here    Consult: None  Tests: None. EKG wnl.     Anticipated LOS: > 5 days  Disposition: IRTS       ATTESTATION    AUDIE Aden CNP

## 2024-01-24 NOTE — PLAN OF CARE
"  Problem: Adult Behavioral Health Plan of Care  Goal: Patient-Specific Goal (Individualization)  Description: Pt. Will follow recommendations of treatment team during hospital stay.   Pt. Will attend  >50% of unit programing during hospital stay.   Pt. Will sleep 6-8 hours a night during hospital stay.  Pt. Will maintain ADLs without prompting during hospital stay.   Outcome: Progressing  Note: Declined morning scheduled Senna and Miralax because he does not want to become dependent on them.  Cooperative with scheduled Vitamin D, Buspar, and Latuda.  He is polite during interactions.  Social with peers.  He attends available groups.  His affect is full range.  He is able to make his needs known.      Patient talks about a nightmare he had last night where he was levitating and a strong force was pulling him. He knows this was a dream and was able to wake himself.  He then describes the nightmare: He talks about holding tears in his hand, then says he was holding a light to keep himself safe safe. He isn't sure how the light got from his hand to where it in now on the wall.  \"It will be okay as long as I stay on my medications so this goes away.\" He motions to his stomach.  He states he hears growling and howling at times throughout the day coming from his stomach.  He then talks about a black donavon bear that he used to carry around as a child that he recently gave to adamsll He describes the donavon bear in detail. \"I don't know if someone is poking that bear and that is what is happening to me.\"  He motions to his stomach again.  Reports poor sleep even after taking PRN melatonin and hydroxyzine last night after this nightmare.  Patient was twisting an orange peel to get the juice from it and rubbing it on his hands and face.  \"Because it's good for your skin.\"         Problem: Psychotic Signs/Symptoms  Goal: Optimal Cognitive Function (Psychotic Signs/Symptoms)  Description: Pt will be free from psychotic symptoms " prior to discharge  Outcome: Not Progressing  Note: Patient continues to have some delusional thoughts.    Goal Outcome Evaluation:

## 2024-01-24 NOTE — PLAN OF CARE
Problem: Adult Behavioral Health Plan of Care  Goal: Patient-Specific Goal (Individualization)  Description: Pt. Will follow recommendations of treatment team during hospital stay.   Pt. Will attend  >50% of unit programing during hospital stay.   Pt. Will sleep 6-8 hours a night during hospital stay.  Pt. Will maintain ADLs without prompting during hospital stay.   Outcome: Progressing    A&O, VSS, denies pain.   Spends evening in lounge, utilizes headphones and walks in [place]oto. Conversation is clear and linear. Pt enjoys talking about music and gives this writer a list of classical pieces he enjoys listening to.   Looking forward to discharge. Denies anxiety and depression.   Denies all criteria including: SI, SIB, HI or hallucinations.       Problem: Psychotic Signs/Symptoms  Goal: Optimal Cognitive Function (Psychotic Signs/Symptoms)  Description: Pt will be free from psychotic symptoms prior to discharge  Intervention: Support and Promote Cognitive Ability  Recent Flowsheet Documentation  Taken 1/23/2024 1700 by Aniya Weaver RN  Trust Relationship/Rapport:   care explained   choices provided   thoughts/feelings acknowledged   reassurance provided   questions answered   Goal Outcome Evaluation:    Plan of Care Reviewed With: patient        Face to face end of shift report communicated to oncoming shift.     Aniya Weaver RN  1/23/2024  8:00 PM

## 2024-01-24 NOTE — PLAN OF CARE
Face to face end of shift report received from Aniya ABBOTT. Rounding completed. Patient observed in bed appears asleep.       Problem: Adult Behavioral Health Plan of Care  Goal: Patient-Specific Goal (Individualization)  Description: Pt. Will follow recommendations of treatment team during hospital stay.   Pt. Will attend  >50% of unit programing during hospital stay.   Pt. Will sleep 6-8 hours a night during hospital stay.  Pt. Will maintain ADLs without prompting during hospital stay.   Outcome: Progressing     Problem: Psychotic Signs/Symptoms  Goal: Optimal Cognitive Function (Psychotic Signs/Symptoms)  Description: Pt will be free from psychotic symptoms prior to discharge  Outcome: Progressing   Goal Outcome Evaluation:         0120 Patient c/o anxiety and sleeplessness given Atarax 25 and Melatonin 3mg. Patient reports nightmare of being suspended in the air and having his limbs manipulated.    No observed episodes of SIB this shift. Patient slept (7) hours. Face to face end of shift report communicated to oncoming shift.     Ashwin Brandon RN  1/24/2024  0621 AM

## 2024-01-25 PROCEDURE — 250N000013 HC RX MED GY IP 250 OP 250 PS 637: Performed by: STUDENT IN AN ORGANIZED HEALTH CARE EDUCATION/TRAINING PROGRAM

## 2024-01-25 PROCEDURE — 250N000013 HC RX MED GY IP 250 OP 250 PS 637: Performed by: NURSE PRACTITIONER

## 2024-01-25 PROCEDURE — 250N000013 HC RX MED GY IP 250 OP 250 PS 637

## 2024-01-25 PROCEDURE — 99239 HOSP IP/OBS DSCHRG MGMT >30: CPT

## 2024-01-25 PROCEDURE — 124N000004

## 2024-01-25 RX ORDER — HYDROXYZINE HYDROCHLORIDE 25 MG/1
25 TABLET, FILM COATED ORAL EVERY 4 HOURS PRN
Qty: 180 TABLET | Refills: 0 | Status: SHIPPED | OUTPATIENT
Start: 2024-01-25

## 2024-01-25 RX ORDER — ALBUTEROL SULFATE 90 UG/1
2 AEROSOL, METERED RESPIRATORY (INHALATION) 4 TIMES DAILY PRN
Qty: 59.5 G | Refills: 0 | Status: SHIPPED | OUTPATIENT
Start: 2024-01-25

## 2024-01-25 RX ORDER — ACETAMINOPHEN 325 MG/1
650 TABLET ORAL EVERY 4 HOURS PRN
COMMUNITY
Start: 2024-01-25

## 2024-01-25 RX ORDER — VITAMIN B COMPLEX
50 TABLET ORAL DAILY
Qty: 30 TABLET | Refills: 0 | Status: SHIPPED | OUTPATIENT
Start: 2024-01-25

## 2024-01-25 RX ORDER — POLYETHYLENE GLYCOL 3350 17 G/17G
17 POWDER, FOR SOLUTION ORAL DAILY
Qty: 510 G | Refills: 0 | Status: SHIPPED | OUTPATIENT
Start: 2024-01-25

## 2024-01-25 RX ORDER — LANOLIN ALCOHOL/MO/W.PET/CERES
3 CREAM (GRAM) TOPICAL
Qty: 30 TABLET | Refills: 0 | Status: SHIPPED | OUTPATIENT
Start: 2024-01-25

## 2024-01-25 RX ORDER — LURASIDONE HYDROCHLORIDE 60 MG/1
60 TABLET, FILM COATED ORAL
Qty: 30 TABLET | Refills: 0 | Status: SHIPPED | OUTPATIENT
Start: 2024-01-25

## 2024-01-25 RX ORDER — AMOXICILLIN 250 MG
2 CAPSULE ORAL 2 TIMES DAILY
Qty: 120 TABLET | Refills: 0 | Status: SHIPPED | OUTPATIENT
Start: 2024-01-25

## 2024-01-25 RX ORDER — BUSPIRONE HYDROCHLORIDE 10 MG/1
10 TABLET ORAL 2 TIMES DAILY
Qty: 60 TABLET | Refills: 0 | Status: SHIPPED | OUTPATIENT
Start: 2024-01-25

## 2024-01-25 RX ORDER — QUETIAPINE FUMARATE 400 MG/1
400 TABLET, FILM COATED ORAL AT BEDTIME
Qty: 30 TABLET | Refills: 0 | Status: SHIPPED | OUTPATIENT
Start: 2024-01-25

## 2024-01-25 RX ADMIN — LURASIDONE HYDROCHLORIDE 60 MG: 20 TABLET, FILM COATED ORAL at 12:25

## 2024-01-25 RX ADMIN — BUSPIRONE HYDROCHLORIDE 10 MG: 10 TABLET ORAL at 08:45

## 2024-01-25 RX ADMIN — PSYLLIUM HUSK 1 PACKET: 3.4 POWDER ORAL at 08:45

## 2024-01-25 RX ADMIN — NICOTINE POLACRILEX 2 MG: 2 GUM, CHEWING ORAL at 19:04

## 2024-01-25 RX ADMIN — BUSPIRONE HYDROCHLORIDE 10 MG: 10 TABLET ORAL at 20:18

## 2024-01-25 RX ADMIN — NICOTINE POLACRILEX 2 MG: 2 GUM, CHEWING ORAL at 10:20

## 2024-01-25 RX ADMIN — NICOTINE POLACRILEX 2 MG: 2 GUM, CHEWING ORAL at 08:46

## 2024-01-25 RX ADMIN — Medication 50 MCG: at 08:45

## 2024-01-25 RX ADMIN — NICOTINE 1 PATCH: 7 PATCH, EXTENDED RELEASE TRANSDERMAL at 08:44

## 2024-01-25 RX ADMIN — QUETIAPINE FUMARATE 400 MG: 400 TABLET ORAL at 20:18

## 2024-01-25 ASSESSMENT — ACTIVITIES OF DAILY LIVING (ADL)
ADLS_ACUITY_SCORE: 32
DRESS: SCRUBS (BEHAVIORAL HEALTH)
ADLS_ACUITY_SCORE: 32
HYGIENE/GROOMING: INDEPENDENT
ADLS_ACUITY_SCORE: 32
ORAL_HYGIENE: INDEPENDENT
ADLS_ACUITY_SCORE: 32
LAUNDRY: UNABLE TO COMPLETE
ADLS_ACUITY_SCORE: 32
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
## 2024-01-25 NOTE — PLAN OF CARE
"  Problem: Psychotic Signs/Symptoms  Goal: Optimal Cognitive Function (Psychotic Signs/Symptoms)  Description: Pt will be free from psychotic symptoms prior to discharge  Outcome: Progressing     Problem: Adult Behavioral Health Plan of Care  Goal: Patient-Specific Goal (Individualization)  Description: Pt. Will follow recommendations of treatment team during hospital stay.   Pt. Will attend  >50% of unit programing during hospital stay.   Pt. Will sleep 6-8 hours a night during hospital stay.  Pt. Will maintain ADLs without prompting during hospital stay.   Outcome: Progressing     Patient has been calm and cooperative this shift.  Spent all evening in the lounge socializing with peers.  Attended groups.  Continues to have some delusional thoughts about things being in his stomach.  Refused HS senna stating he needs a \"break\" from the med.  No complaints of pain.  VS WNL.  Face to face end of shift report communicated to night shift RN.     Christelle Angel RN  1/24/2024  8:48 PM      "

## 2024-01-25 NOTE — PROGRESS NOTES
Denita Rashid  with [facility] met with the patient @ 9:15 am this morning to screen him for MI services for [facility].

## 2024-01-25 NOTE — PLAN OF CARE
Shift Note: 1500 to 0730    Problem: Psychotic Signs/Symptoms  Goal: Optimal Cognitive Function (Psychotic Signs/Symptoms)  Description: Pt will be free from psychotic symptoms prior to discharge  Outcome: Progressing     Problem: Adult Behavioral Health Plan of Care  Goal: Patient-Specific Goal (Individualization)  Description: Pt. Will follow recommendations of treatment team during hospital stay.   Pt. Will attend  >50% of unit programing during hospital stay.   Pt. Will sleep 6-8 hours a night during hospital stay.  Pt. Will maintain ADLs without prompting during hospital stay.   Outcome: Progressing       Patient has been calm, cooperative, and medication compliant this shift.  He states he is excited to be discharging in the morning and feels ready to go.  Social in the lounge with peers.  No delusional statements noted.  Attended groups.  Ate 100% of meals.  Refused HS Senna but took all other medications. No complaints of pain.  VS WNL.  Patient in bed by 2030.

## 2024-01-25 NOTE — PLAN OF CARE
Problem: Adult Behavioral Health Plan of Care  Goal: Patient-Specific Goal (Individualization)  Description: Pt. Will follow recommendations of treatment team during hospital stay.   Pt. Will attend  >50% of unit programing during hospital stay.   Pt. Will sleep 6-8 hours a night during hospital stay.  Pt. Will maintain ADLs without prompting during hospital stay.   Outcome: Progressing   Goal Outcome Evaluation:        Face to face shift report received from RN. Rounding completed, pt observed.Client rested in room for 7 hours with eyes closed and respirations noted.Face to face report will be communicated to oncoming RN.    Gary Tolliver RN  1/25/2024  5:37 AM

## 2024-01-26 VITALS
RESPIRATION RATE: 16 BRPM | WEIGHT: 310.4 LBS | BODY MASS INDEX: 42.1 KG/M2 | DIASTOLIC BLOOD PRESSURE: 89 MMHG | TEMPERATURE: 98 F | SYSTOLIC BLOOD PRESSURE: 133 MMHG | HEART RATE: 117 BPM | OXYGEN SATURATION: 96 %

## 2024-01-26 PROCEDURE — 250N000013 HC RX MED GY IP 250 OP 250 PS 637: Performed by: NURSE PRACTITIONER

## 2024-01-26 PROCEDURE — 250N000013 HC RX MED GY IP 250 OP 250 PS 637: Performed by: STUDENT IN AN ORGANIZED HEALTH CARE EDUCATION/TRAINING PROGRAM

## 2024-01-26 RX ADMIN — Medication 50 MCG: at 05:47

## 2024-01-26 RX ADMIN — BUSPIRONE HYDROCHLORIDE 10 MG: 10 TABLET ORAL at 05:48

## 2024-01-26 RX ADMIN — NICOTINE 1 PATCH: 7 PATCH, EXTENDED RELEASE TRANSDERMAL at 05:48

## 2024-01-26 ASSESSMENT — ACTIVITIES OF DAILY LIVING (ADL)
ADLS_ACUITY_SCORE: 32

## 2024-01-26 NOTE — PLAN OF CARE
Discharge Note    Patient Discharged to [facility] on 1/26/2024 6:10 AM via [facility] accompanied to door by [facility] staff.     Patient informed of discharge instructions in AVS. patient verbalizes understanding and denies having any questions pertaining to AVS. Patient stable at time of discharge. Patient denies SI, HI, and thoughts of self harm at time of discharge. All personal belongings returned to patient. Discharge prescriptions sent to [facility] pharmacy via electronic communication. 30 day supply of medications picked-up and sent with patient.  Psych evaluation, history and physical, AVS, and discharge summary faxed to next level of care- per .     Patient's home medications from inpatient pharmacy will be mailed to patient as the pharmacy was closed when he discharged.  Patient has a new supply of current medications that were sent with him.      Christelle Angel RN  1/26/2024  6:10 AM

## 2024-01-26 NOTE — PROGRESS NOTES
Pt is discharging at the recommendation of the treatment team. Pt is discharging to [facility] transported by [employer]. Pt denies having any thoughts of hurting themself or anyone else. Pt denies anxiety or depression. Pt has follow up with his  Julito Richey and [facility]. Discharge instructions, including; demographic sheet, psychiatric evaluation, discharge summary, and AVS were faxed to these next level of care providers.

## 2024-01-29 ENCOUNTER — TELEPHONE (OUTPATIENT)
Dept: BEHAVIORAL HEALTH | Facility: HOSPITAL | Age: 34
End: 2024-01-29
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
## 2024-04-03 NOTE — PLAN OF CARE
Problem: Adult Behavioral Health Plan of Care  Goal: Patient-Specific Goal (Individualization)  Description: Pt. Will follow recommendations of treatment team during hospital stay.   Pt. Will attend  >50% of unit programing during hospital stay.   Pt. Will sleep 6-8 hours a night during hospital stay.  Pt. Will maintain ADLs without prompting during hospital stay.   Outcome: Progressing     Problem: Psychotic Signs/Symptoms  Goal: Improved Behavioral Control (Psychotic Signs/Symptoms)  Outcome: Progressing    Pt denies SI/HI, AH/VH, pain and depression. Pt endorses anxiety. Pt is medication compliant and VSS. Pt ate 100% of dinner. Pt has a flat affect. Pt is calm and alert. Pt is using appropriate behavior with staff and peers. Pt isolative and withdrawn at start of shift. After X-ray done Pt out in lounge watching tv and being social with peers.     Face to face report will be communicated to oncoming RN.    Tracey Nieves RN  1/3/2024        Counseled on low calorie diet and regular exercise.